# Patient Record
Sex: MALE | Race: WHITE | NOT HISPANIC OR LATINO | Employment: UNEMPLOYED | URBAN - METROPOLITAN AREA
[De-identification: names, ages, dates, MRNs, and addresses within clinical notes are randomized per-mention and may not be internally consistent; named-entity substitution may affect disease eponyms.]

---

## 2017-04-26 ENCOUNTER — RESOLUTE PROFESSIONAL BILLING HOSPITAL PROF FEE (OUTPATIENT)
Dept: HOSPITALIST | Facility: MEDICAL CENTER | Age: 32
End: 2017-04-26
Payer: MEDICAID

## 2017-04-26 ENCOUNTER — HOSPITAL ENCOUNTER (INPATIENT)
Facility: MEDICAL CENTER | Age: 32
LOS: 2 days | DRG: 639 | End: 2017-04-28
Attending: EMERGENCY MEDICINE | Admitting: HOSPITALIST
Payer: MEDICAID

## 2017-04-26 DIAGNOSIS — Z91.148 NONCOMPLIANCE WITH MEDICATION REGIMEN: ICD-10-CM

## 2017-04-26 DIAGNOSIS — E10.10 DIABETIC KETOACIDOSIS WITHOUT COMA ASSOCIATED WITH TYPE 1 DIABETES MELLITUS (HCC): ICD-10-CM

## 2017-04-26 PROBLEM — E11.10 DKA (DIABETIC KETOACIDOSES): Status: ACTIVE | Noted: 2017-04-26

## 2017-04-26 LAB
ALBUMIN SERPL BCP-MCNC: 4.1 G/DL (ref 3.2–4.9)
ALBUMIN SERPL BCP-MCNC: 4.5 G/DL (ref 3.2–4.9)
ALBUMIN/GLOB SERPL: 1.5 G/DL
ALBUMIN/GLOB SERPL: 1.6 G/DL
ALP SERPL-CCNC: 76 U/L (ref 30–99)
ALP SERPL-CCNC: 84 U/L (ref 30–99)
ALT SERPL-CCNC: 10 U/L (ref 2–50)
ALT SERPL-CCNC: 12 U/L (ref 2–50)
ANION GAP SERPL CALC-SCNC: 15 MMOL/L (ref 0–11.9)
ANION GAP SERPL CALC-SCNC: 8 MMOL/L (ref 0–11.9)
APPEARANCE UR: CLEAR
AST SERPL-CCNC: 15 U/L (ref 12–45)
AST SERPL-CCNC: 8 U/L (ref 12–45)
B-OH-BUTYR SERPL-MCNC: 3.94 MMOL/L (ref 0.02–0.27)
BASOPHILS # BLD AUTO: 0.6 % (ref 0–1.8)
BASOPHILS # BLD AUTO: 0.7 % (ref 0–1.8)
BASOPHILS # BLD: 0.04 K/UL (ref 0–0.12)
BASOPHILS # BLD: 0.04 K/UL (ref 0–0.12)
BILIRUB SERPL-MCNC: 0.7 MG/DL (ref 0.1–1.5)
BILIRUB SERPL-MCNC: 0.9 MG/DL (ref 0.1–1.5)
BILIRUB UR QL STRIP.AUTO: NEGATIVE
BUN SERPL-MCNC: 19 MG/DL (ref 8–22)
BUN SERPL-MCNC: 24 MG/DL (ref 8–22)
CALCIUM SERPL-MCNC: 8.7 MG/DL (ref 8.5–10.5)
CALCIUM SERPL-MCNC: 9.4 MG/DL (ref 8.5–10.5)
CHLORIDE SERPL-SCNC: 104 MMOL/L (ref 96–112)
CHLORIDE SERPL-SCNC: 95 MMOL/L (ref 96–112)
CO2 SERPL-SCNC: 20 MMOL/L (ref 20–33)
CO2 SERPL-SCNC: 22 MMOL/L (ref 20–33)
COLOR UR: COLORLESS
CREAT SERPL-MCNC: 0.78 MG/DL (ref 0.5–1.4)
CREAT SERPL-MCNC: 1.08 MG/DL (ref 0.5–1.4)
CULTURE IF INDICATED INDCX: NO UA CULTURE
EOSINOPHIL # BLD AUTO: 0.05 K/UL (ref 0–0.51)
EOSINOPHIL # BLD AUTO: 0.12 K/UL (ref 0–0.51)
EOSINOPHIL NFR BLD: 0.7 % (ref 0–6.9)
EOSINOPHIL NFR BLD: 2 % (ref 0–6.9)
ERYTHROCYTE [DISTWIDTH] IN BLOOD BY AUTOMATED COUNT: 38.1 FL (ref 35.9–50)
ERYTHROCYTE [DISTWIDTH] IN BLOOD BY AUTOMATED COUNT: 38.3 FL (ref 35.9–50)
GFR SERPL CREATININE-BSD FRML MDRD: >60 ML/MIN/1.73 M 2
GFR SERPL CREATININE-BSD FRML MDRD: >60 ML/MIN/1.73 M 2
GLOBULIN SER CALC-MCNC: 2.5 G/DL (ref 1.9–3.5)
GLOBULIN SER CALC-MCNC: 3.1 G/DL (ref 1.9–3.5)
GLUCOSE BLD-MCNC: 569 MG/DL (ref 65–99)
GLUCOSE SERPL-MCNC: 284 MG/DL (ref 65–99)
GLUCOSE SERPL-MCNC: 658 MG/DL (ref 65–99)
GLUCOSE UR STRIP.AUTO-MCNC: >1000 MG/DL
HCT VFR BLD AUTO: 41.8 % (ref 42–52)
HCT VFR BLD AUTO: 46.5 % (ref 42–52)
HGB BLD-MCNC: 14.1 G/DL (ref 14–18)
HGB BLD-MCNC: 15.7 G/DL (ref 14–18)
IMM GRANULOCYTES # BLD AUTO: 0.01 K/UL (ref 0–0.11)
IMM GRANULOCYTES # BLD AUTO: 0.02 K/UL (ref 0–0.11)
IMM GRANULOCYTES NFR BLD AUTO: 0.2 % (ref 0–0.9)
IMM GRANULOCYTES NFR BLD AUTO: 0.3 % (ref 0–0.9)
KETONES UR STRIP.AUTO-MCNC: 80 MG/DL
LEUKOCYTE ESTERASE UR QL STRIP.AUTO: NEGATIVE
LIPASE SERPL-CCNC: 4 U/L (ref 11–82)
LYMPHOCYTES # BLD AUTO: 1.39 K/UL (ref 1–4.8)
LYMPHOCYTES # BLD AUTO: 2.09 K/UL (ref 1–4.8)
LYMPHOCYTES NFR BLD: 20.3 % (ref 22–41)
LYMPHOCYTES NFR BLD: 34.9 % (ref 22–41)
MAGNESIUM SERPL-MCNC: 2 MG/DL (ref 1.5–2.5)
MCH RBC QN AUTO: 29.3 PG (ref 27–33)
MCH RBC QN AUTO: 29.6 PG (ref 27–33)
MCHC RBC AUTO-ENTMCNC: 33.7 G/DL (ref 33.7–35.3)
MCHC RBC AUTO-ENTMCNC: 33.8 G/DL (ref 33.7–35.3)
MCV RBC AUTO: 86.9 FL (ref 81.4–97.8)
MCV RBC AUTO: 87.7 FL (ref 81.4–97.8)
MICRO URNS: ABNORMAL
MONOCYTES # BLD AUTO: 0.43 K/UL (ref 0–0.85)
MONOCYTES # BLD AUTO: 0.44 K/UL (ref 0–0.85)
MONOCYTES NFR BLD AUTO: 6.3 % (ref 0–13.4)
MONOCYTES NFR BLD AUTO: 7.3 % (ref 0–13.4)
NEUTROPHILS # BLD AUTO: 3.29 K/UL (ref 1.82–7.42)
NEUTROPHILS # BLD AUTO: 4.92 K/UL (ref 1.82–7.42)
NEUTROPHILS NFR BLD: 54.9 % (ref 44–72)
NEUTROPHILS NFR BLD: 71.8 % (ref 44–72)
NITRITE UR QL STRIP.AUTO: NEGATIVE
NRBC # BLD AUTO: 0 K/UL
NRBC # BLD AUTO: 0 K/UL
NRBC BLD AUTO-RTO: 0 /100 WBC
NRBC BLD AUTO-RTO: 0 /100 WBC
PH UR STRIP.AUTO: 5 [PH]
PLATELET # BLD AUTO: 230 K/UL (ref 164–446)
PLATELET # BLD AUTO: 302 K/UL (ref 164–446)
PMV BLD AUTO: 10 FL (ref 9–12.9)
PMV BLD AUTO: 10.1 FL (ref 9–12.9)
POTASSIUM SERPL-SCNC: 4.7 MMOL/L (ref 3.6–5.5)
POTASSIUM SERPL-SCNC: 5 MMOL/L (ref 3.6–5.5)
PROT SERPL-MCNC: 6.6 G/DL (ref 6–8.2)
PROT SERPL-MCNC: 7.6 G/DL (ref 6–8.2)
PROT UR QL STRIP: NEGATIVE MG/DL
RBC # BLD AUTO: 4.81 M/UL (ref 4.7–6.1)
RBC # BLD AUTO: 5.3 M/UL (ref 4.7–6.1)
RBC UR QL AUTO: NEGATIVE
SODIUM SERPL-SCNC: 130 MMOL/L (ref 135–145)
SODIUM SERPL-SCNC: 134 MMOL/L (ref 135–145)
SP GR UR STRIP.AUTO: 1.03
TSH SERPL DL<=0.005 MIU/L-ACNC: 1.11 UIU/ML (ref 0.3–3.7)
WBC # BLD AUTO: 6 K/UL (ref 4.8–10.8)
WBC # BLD AUTO: 6.9 K/UL (ref 4.8–10.8)

## 2017-04-26 PROCEDURE — 99291 CRITICAL CARE FIRST HOUR: CPT

## 2017-04-26 PROCEDURE — 700111 HCHG RX REV CODE 636 W/ 250 OVERRIDE (IP): Performed by: HOSPITALIST

## 2017-04-26 PROCEDURE — 700102 HCHG RX REV CODE 250 W/ 637 OVERRIDE(OP): Performed by: HOSPITALIST

## 2017-04-26 PROCEDURE — 96375 TX/PRO/DX INJ NEW DRUG ADDON: CPT

## 2017-04-26 PROCEDURE — A9270 NON-COVERED ITEM OR SERVICE: HCPCS | Performed by: HOSPITALIST

## 2017-04-26 PROCEDURE — 700111 HCHG RX REV CODE 636 W/ 250 OVERRIDE (IP): Performed by: EMERGENCY MEDICINE

## 2017-04-26 PROCEDURE — 83735 ASSAY OF MAGNESIUM: CPT

## 2017-04-26 PROCEDURE — 700105 HCHG RX REV CODE 258: Performed by: EMERGENCY MEDICINE

## 2017-04-26 PROCEDURE — 85025 COMPLETE CBC W/AUTO DIFF WBC: CPT

## 2017-04-26 PROCEDURE — 770022 HCHG ROOM/CARE - ICU (200)

## 2017-04-26 PROCEDURE — 84443 ASSAY THYROID STIM HORMONE: CPT

## 2017-04-26 PROCEDURE — 83690 ASSAY OF LIPASE: CPT

## 2017-04-26 PROCEDURE — 94760 N-INVAS EAR/PLS OXIMETRY 1: CPT

## 2017-04-26 PROCEDURE — 84100 ASSAY OF PHOSPHORUS: CPT

## 2017-04-26 PROCEDURE — 96374 THER/PROPH/DIAG INJ IV PUSH: CPT

## 2017-04-26 PROCEDURE — 81003 URINALYSIS AUTO W/O SCOPE: CPT

## 2017-04-26 PROCEDURE — 99223 1ST HOSP IP/OBS HIGH 75: CPT | Performed by: HOSPITALIST

## 2017-04-26 PROCEDURE — 96361 HYDRATE IV INFUSION ADD-ON: CPT

## 2017-04-26 PROCEDURE — 700105 HCHG RX REV CODE 258: Performed by: HOSPITALIST

## 2017-04-26 PROCEDURE — 82962 GLUCOSE BLOOD TEST: CPT | Mod: 91

## 2017-04-26 PROCEDURE — 82010 KETONE BODYS QUAN: CPT

## 2017-04-26 PROCEDURE — 83036 HEMOGLOBIN GLYCOSYLATED A1C: CPT

## 2017-04-26 PROCEDURE — 80053 COMPREHEN METABOLIC PANEL: CPT | Mod: 91

## 2017-04-26 RX ORDER — NICOTINE 21 MG/24HR
14 PATCH, TRANSDERMAL 24 HOURS TRANSDERMAL
Status: DISCONTINUED | OUTPATIENT
Start: 2017-04-27 | End: 2017-04-28 | Stop reason: HOSPADM

## 2017-04-26 RX ORDER — GLIPIZIDE 10 MG/1
10 TABLET ORAL 2 TIMES DAILY
Status: ON HOLD | COMMUNITY
End: 2017-04-27

## 2017-04-26 RX ORDER — POLYETHYLENE GLYCOL 3350 17 G/17G
1 POWDER, FOR SOLUTION ORAL
Status: DISCONTINUED | OUTPATIENT
Start: 2017-04-26 | End: 2017-04-28 | Stop reason: HOSPADM

## 2017-04-26 RX ORDER — MAGNESIUM SULFATE HEPTAHYDRATE 40 MG/ML
4 INJECTION, SOLUTION INTRAVENOUS
Status: DISCONTINUED | OUTPATIENT
Start: 2017-04-26 | End: 2017-04-27 | Stop reason: ALTCHOICE

## 2017-04-26 RX ORDER — ONDANSETRON 2 MG/ML
4 INJECTION INTRAMUSCULAR; INTRAVENOUS EVERY 4 HOURS PRN
Status: DISCONTINUED | OUTPATIENT
Start: 2017-04-26 | End: 2017-04-28 | Stop reason: HOSPADM

## 2017-04-26 RX ORDER — ONDANSETRON 2 MG/ML
4 INJECTION INTRAMUSCULAR; INTRAVENOUS ONCE
Status: COMPLETED | OUTPATIENT
Start: 2017-04-26 | End: 2017-04-26

## 2017-04-26 RX ORDER — GABAPENTIN 300 MG/1
300 CAPSULE ORAL 3 TIMES DAILY
Status: DISCONTINUED | OUTPATIENT
Start: 2017-04-26 | End: 2017-04-28 | Stop reason: HOSPADM

## 2017-04-26 RX ORDER — SODIUM CHLORIDE 9 MG/ML
1000 INJECTION, SOLUTION INTRAVENOUS ONCE
Status: COMPLETED | OUTPATIENT
Start: 2017-04-26 | End: 2017-04-26

## 2017-04-26 RX ORDER — MAGNESIUM SULFATE HEPTAHYDRATE 40 MG/ML
2 INJECTION, SOLUTION INTRAVENOUS
Status: DISCONTINUED | OUTPATIENT
Start: 2017-04-26 | End: 2017-04-27 | Stop reason: ALTCHOICE

## 2017-04-26 RX ORDER — SODIUM CHLORIDE 9 MG/ML
INJECTION, SOLUTION INTRAVENOUS CONTINUOUS
Status: DISPENSED | OUTPATIENT
Start: 2017-04-26 | End: 2017-04-27

## 2017-04-26 RX ORDER — DEXTROSE AND SODIUM CHLORIDE 10; .45 G/100ML; G/100ML
INJECTION, SOLUTION INTRAVENOUS CONTINUOUS
Status: DISCONTINUED | OUTPATIENT
Start: 2017-04-26 | End: 2017-04-27

## 2017-04-26 RX ORDER — AMOXICILLIN 250 MG
2 CAPSULE ORAL 2 TIMES DAILY
Status: DISCONTINUED | OUTPATIENT
Start: 2017-04-26 | End: 2017-04-28 | Stop reason: HOSPADM

## 2017-04-26 RX ORDER — POTASSIUM CHLORIDE 7.45 MG/ML
10 INJECTION INTRAVENOUS ONCE
Status: COMPLETED | OUTPATIENT
Start: 2017-04-27 | End: 2017-04-27

## 2017-04-26 RX ORDER — ZOLPIDEM TARTRATE 5 MG/1
5 TABLET ORAL
Status: DISCONTINUED | OUTPATIENT
Start: 2017-04-26 | End: 2017-04-28 | Stop reason: HOSPADM

## 2017-04-26 RX ORDER — PROMETHAZINE HYDROCHLORIDE 25 MG/1
12.5-25 SUPPOSITORY RECTAL EVERY 4 HOURS PRN
Status: DISCONTINUED | OUTPATIENT
Start: 2017-04-26 | End: 2017-04-28 | Stop reason: HOSPADM

## 2017-04-26 RX ORDER — PROMETHAZINE HYDROCHLORIDE 25 MG/1
12.5-25 TABLET ORAL EVERY 4 HOURS PRN
Status: DISCONTINUED | OUTPATIENT
Start: 2017-04-26 | End: 2017-04-28 | Stop reason: HOSPADM

## 2017-04-26 RX ORDER — LABETALOL HYDROCHLORIDE 5 MG/ML
10 INJECTION, SOLUTION INTRAVENOUS EVERY 4 HOURS PRN
Status: DISCONTINUED | OUTPATIENT
Start: 2017-04-26 | End: 2017-04-28 | Stop reason: HOSPADM

## 2017-04-26 RX ORDER — SODIUM CHLORIDE 9 MG/ML
2000 INJECTION, SOLUTION INTRAVENOUS ONCE
Status: COMPLETED | OUTPATIENT
Start: 2017-04-26 | End: 2017-04-26

## 2017-04-26 RX ORDER — DEXTROSE MONOHYDRATE 25 G/50ML
25 INJECTION, SOLUTION INTRAVENOUS
Status: DISCONTINUED | OUTPATIENT
Start: 2017-04-26 | End: 2017-04-28 | Stop reason: HOSPADM

## 2017-04-26 RX ORDER — SODIUM CHLORIDE 450 MG/100ML
INJECTION, SOLUTION INTRAVENOUS CONTINUOUS
Status: DISCONTINUED | OUTPATIENT
Start: 2017-04-26 | End: 2017-04-27

## 2017-04-26 RX ORDER — LORAZEPAM 2 MG/ML
0.5 INJECTION INTRAMUSCULAR EVERY 6 HOURS PRN
Status: DISCONTINUED | OUTPATIENT
Start: 2017-04-26 | End: 2017-04-28 | Stop reason: HOSPADM

## 2017-04-26 RX ORDER — MORPHINE SULFATE 4 MG/ML
4 INJECTION, SOLUTION INTRAMUSCULAR; INTRAVENOUS ONCE
Status: COMPLETED | OUTPATIENT
Start: 2017-04-26 | End: 2017-04-26

## 2017-04-26 RX ORDER — ACETAMINOPHEN 325 MG/1
650 TABLET ORAL EVERY 6 HOURS PRN
Status: DISCONTINUED | OUTPATIENT
Start: 2017-04-26 | End: 2017-04-28 | Stop reason: HOSPADM

## 2017-04-26 RX ORDER — SODIUM CHLORIDE 9 MG/ML
2000 INJECTION, SOLUTION INTRAVENOUS ONCE
Status: DISCONTINUED | OUTPATIENT
Start: 2017-04-26 | End: 2017-04-27

## 2017-04-26 RX ORDER — DEXTROSE AND SODIUM CHLORIDE 5; .45 G/100ML; G/100ML
INJECTION, SOLUTION INTRAVENOUS CONTINUOUS
Status: DISCONTINUED | OUTPATIENT
Start: 2017-04-26 | End: 2017-04-27

## 2017-04-26 RX ORDER — BISACODYL 10 MG
10 SUPPOSITORY, RECTAL RECTAL
Status: DISCONTINUED | OUTPATIENT
Start: 2017-04-26 | End: 2017-04-28 | Stop reason: HOSPADM

## 2017-04-26 RX ORDER — HEPARIN SODIUM 5000 [USP'U]/ML
5000 INJECTION, SOLUTION INTRAVENOUS; SUBCUTANEOUS EVERY 8 HOURS
Status: DISCONTINUED | OUTPATIENT
Start: 2017-04-26 | End: 2017-04-28 | Stop reason: HOSPADM

## 2017-04-26 RX ORDER — LORAZEPAM 0.5 MG/1
0.5 TABLET ORAL EVERY 6 HOURS PRN
Status: DISCONTINUED | OUTPATIENT
Start: 2017-04-26 | End: 2017-04-28 | Stop reason: HOSPADM

## 2017-04-26 RX ORDER — ONDANSETRON 4 MG/1
4 TABLET, ORALLY DISINTEGRATING ORAL EVERY 4 HOURS PRN
Status: DISCONTINUED | OUTPATIENT
Start: 2017-04-26 | End: 2017-04-28 | Stop reason: HOSPADM

## 2017-04-26 RX ADMIN — SODIUM CHLORIDE 1000 ML: 9 INJECTION, SOLUTION INTRAVENOUS at 18:34

## 2017-04-26 RX ADMIN — ZOLPIDEM TARTRATE 5 MG: 5 TABLET, FILM COATED ORAL at 23:12

## 2017-04-26 RX ADMIN — SODIUM CHLORIDE 2000 ML: 9 INJECTION, SOLUTION INTRAVENOUS at 19:31

## 2017-04-26 RX ADMIN — INSULIN HUMAN 7 UNITS: 100 INJECTION, SOLUTION PARENTERAL at 23:12

## 2017-04-26 RX ADMIN — GABAPENTIN 300 MG: 300 CAPSULE ORAL at 23:12

## 2017-04-26 RX ADMIN — SODIUM CHLORIDE: 9 INJECTION, SOLUTION INTRAVENOUS at 23:11

## 2017-04-26 RX ADMIN — MORPHINE SULFATE 4 MG: 4 INJECTION INTRAVENOUS at 20:42

## 2017-04-26 RX ADMIN — SODIUM CHLORIDE 7 UNITS/HR: 9 INJECTION, SOLUTION INTRAVENOUS at 23:12

## 2017-04-26 RX ADMIN — HEPARIN SODIUM 5000 UNITS: 5000 INJECTION, SOLUTION INTRAVENOUS; SUBCUTANEOUS at 23:12

## 2017-04-26 RX ADMIN — ONDANSETRON 4 MG: 2 INJECTION INTRAMUSCULAR; INTRAVENOUS at 20:42

## 2017-04-26 ASSESSMENT — LIFESTYLE VARIABLES
DO YOU DRINK ALCOHOL: NO
DO YOU DRINK ALCOHOL: NO
EVER_SMOKED: YES

## 2017-04-26 ASSESSMENT — PAIN SCALES - GENERAL: PAINLEVEL_OUTOF10: 6

## 2017-04-26 NOTE — IP AVS SNAPSHOT
" Home Care Instructions                                                                                                                  Name:Paras Lamb  Medical Record Number:[id number]  CSN: [id number]    YOB: 1985   Age: 31 y.o.  Sex: male  HT:1.778 m (5' 10\") WT: 70.4 kg (155 lb 3.3 oz)          Admit Date: 4/26/2017     Discharge Date:   Today's Date: 4/28/2017  Attending Doctor:  Casey Alan M.D.                  Allergies:  Review of patient's allergies indicates no known allergies.            Discharge Instructions       Discharge Instructions    Discharged to home by car with self. Discharged via walking, hospital escort: Refused.  Special equipment needed: Not Applicable    Be sure to schedule a follow-up appointment with your primary care doctor or any specialists as instructed.     Discharge Plan:   Diet Plan: Discussed  Activity Level: Discussed  Smoking Cessation Offered: Patient Refused  Confirmed Follow up Appointment: Patient to Call and Schedule Appointment  Confirmed Symptoms Management: Discussed  Medication Reconciliation Updated: Yes  Influenza Vaccine Indication: Not indicated: Previously immunized this influenza season and > 8 years of age    I understand that a diet low in cholesterol, fat, and sodium is recommended for good health. Unless I have been given specific instructions below for another diet, I accept this instruction as my diet prescription.   Other diet: Diabetic Diet    Special Instructions: None    · Is patient discharged on Warfarin / Coumadin?   No     · Is patient Post Blood Transfusion?  No    Depression / Suicide Risk    As you are discharged from this [facility] facility, it is important to learn how to keep safe from harming yourself.    Recognize the warning signs:  · Abrupt changes in personality, positive or negative- including increase in energy   · Giving away possessions  · Change in eating patterns- significant weight changes-  positive or " negative  · Change in sleeping patterns- unable to sleep or sleeping all the time   · Unwillingness or inability to communicate  · Depression  · Unusual sadness, discouragement and loneliness  · Talk of wanting to die  · Neglect of personal appearance   · Rebelliousness- reckless behavior  · Withdrawal from people/activities they love  · Confusion- inability to concentrate     If you or a loved one observes any of these behaviors or has concerns about self-harm, here's what you can do:  · Talk about it- your feelings and reasons for harming yourself  · Remove any means that you might use to hurt yourself (examples: pills, rope, extension cords, firearm)  · Get professional help from the community (Mental Health, Substance Abuse, psychological counseling)  · Do not be alone:Call your Safe Contact- someone whom you trust who will be there for you.  · Call your local CRISIS HOTLINE 144-0136 or 343-268-3821  · Call your local Children's Mobile Crisis Response Team [place] (561) 336-3152 or www.relocality  · Call the toll free National Suicide Prevention Hotlines   · National Suicide Prevention Lifeline 256-260-RPRK (1349)  · National Hope Line Network 800-SUICIDE (015-1792)        Follow-up Information     1. Follow up with GIOVANNI Villarreal. Go on 5/2/2017.    Why:  Please arrive at 8am for your appointment. Please bring photo ID, insurance information, and list of medications.    Contact information    [address] [place] [phone]         Discharge Medication Instructions:    Below are the medications your physician expects you to take upon discharge:    Review all your home medications and newly ordered medications with your doctor and/or pharmacist. Follow medication instructions as directed by your doctor and/or pharmacist.    Please keep your medication list with you and share with your physician.               Medication List      START taking these medications         Instructions    Morning Afternoon Evening Bedtime    gabapentin 300 MG Caps   Last time this was given:  300 mg on 4/28/2017  9:48 AM   Commonly known as:  NEURONTIN        Take 1 Cap by mouth 3 times a day.   Dose:  300 mg                        insulin lispro 100 UNIT/ML Soln   Last time this was given:  7 Units on 4/28/2017 12:55 PM   Commonly known as:  HUMALOG        Inject 3-14 Units as instructed every 6 hours.   Dose:  3-14 Units                        zolpidem 5 MG Tabs   Last time this was given:  5 mg on 4/27/2017  9:24 PM   Commonly known as:  AMBIEN        Take 1 Tab by mouth at bedtime as needed.  May repeat 1 time..   Dose:  5 mg                          CHANGE how you take these medications        Instructions    Morning Afternoon Evening Bedtime    glipiZIDE 5 MG Tabs   What changed:    - medication strength  - how much to take  - when to take this   Last time this was given:  5 mg on 4/27/2017  8:11 AM   Commonly known as:  GLUCOTROL        Take 1 Tab by mouth every morning before breakfast.   Dose:  5 mg                        * insulin 70/30 (70-30) 100 UNIT/ML Susp   What changed:  Another medication with the same name was added. Make sure you understand how and when to take each.   Last time this was given:  20 Units on 4/27/2017  3:46 AM   Commonly known as:  HUMULIN,NOVOLIN        Inject 20 Units as instructed 2 Times a Day.   Dose:  20 Units                        * insulin 70/30 (70-30) 100 UNIT/ML Susp   What changed:  You were already taking a medication with the same name, and this prescription was added. Make sure you understand how and when to take each.   Last time this was given:  20 Units on 4/27/2017  3:46 AM   Commonly known as:  HUMULIN,NOVOLIN        Inject 20 Units as instructed 2 Times a Day.   Dose:  20 Units                        * Notice:  This list has 2 medication(s) that are the same as other medications prescribed for you. Read the directions carefully, and ask your  doctor or other care provider to review them with you.      CONTINUE taking these medications        Instructions    Morning Afternoon Evening Bedtime    insulin aspart 100 UNIT/ML Soln   Commonly known as:  NOVOLOG        Inject 2-10 Units as instructed 3 times a day before meals. Pt takes: 151-200 2 units 201-250 4 units 251-300 6 units 301-350 8 units 351-400 10 units >400 call MD   Dose:  2-10 Units                             Where to Get Your Medications      Information about where to get these medications is not yet available     ! Ask your nurse or doctor about these medications    - gabapentin 300 MG Caps  - glipiZIDE 5 MG Tabs  - insulin 70/30 (70-30) 100 UNIT/ML Susp  - insulin lispro 100 UNIT/ML Soln  - zolpidem 5 MG Tabs            Instructions           Diet / Nutrition:    Follow any diet instructions given to you by your doctor or the dietician, including how much salt (sodium) you are allowed each day.    If you are overweight, talk to your doctor about a weight reduction plan.    Activity:    Remain physically active following your doctor's instructions about exercise and activity.    Rest often.     Any time you become even a little tired or short of breath, SIT DOWN and rest.    Worsening Symptoms:    Report any of the following signs and symptoms to the doctor's office immediately:    *Pain of jaw, arm, or neck  *Chest pain not relieved by medication                               *Dizziness or loss of consciousness  *Difficulty breathing even when at rest   *More tired than usual                                       *Bleeding drainage or swelling of surgical site  *Swelling of feet, ankles, legs or stomach                 *Fever (>100ºF)  *Pink or blood tinged sputum  *Weight gain (3lbs/day or 5lbs /week)           *Shock from internal defibrillator (if applicable)  *Palpitations or irregular heartbeats                *Cool and/or numb extremities    Stroke Awareness    Common Risk Factors for  Stroke include:    Age  Atrial Fibrillation  Carotid Artery Stenosis  Diabetes Mellitus  Excessive alcohol consumption  High blood pressure  Overweight   Physical inactivity  Smoking    Warning signs and symptoms of a stroke include:    *Sudden numbness or weakness of the face, arm or leg (especially on one side of the body).  *Sudden confusion, trouble speaking or understanding.  *Sudden trouble seeing in one or both eyes.  *Sudden trouble walking, dizziness, loss of balance or coordination.Sudden severe headache with no known cause.    It is very important to get treatment quickly when a stroke occurs. If you experience any of the above warning signs, call 911 immediately.                   Disclaimer         Quit Smoking / Tobacco Use:    I understand the use of any tobacco products increases my chance of suffering from future heart disease or stroke and could cause other illnesses which may shorten my life. Quitting the use of tobacco products is the single most important thing I can do to improve my health. For further information on smoking / tobacco cessation call a Toll Free Quit Line at 1-912.867.5997 (*National Cancer [place]) or 1-609.824.5482 (American Lung Association) or you can access the web based program at www.lungusa.org.    [place] Tobacco Users Help Line:  (417) 515-8625       Toll Free: 1-684.383.9846  Quit Tobacco Program [facility] (532)476-9950    Crisis Hotline:    [place] Crisis Hotline:  8-365-PKVOBNC or 1-100.961.1882    [place] Crisis Hotline:    1-205.750.1238 or 158-261-4788    Discharge Survey:   Thank you for choosing [facility]. We hope we did everything we could to make your hospital stay a pleasant one. You may be receiving a phone survey and we would appreciate your time and participation in answering the questions. Your input is very valuable to us in our efforts to improve our service to our patients and their families.        My signature on this  form indicates that:    1. I have reviewed and understand the above information.  2. My questions regarding this information have been answered to my satisfaction.  3. I have formulated a plan with my discharge nurse to obtain my prescribed medications for home.                  Disclaimer         __________________________________                     __________       ________                       Patient Signature                                                 Date                    Time

## 2017-04-26 NOTE — IP AVS SNAPSHOT
" <p align=\"LEFT\"><IMG SRC=\"//[email or web address]/blob$/[email or web address]\" alt=\"Image\" WIDTH=\"50%\" HEIGHT=\"200\" BORDER=\"\"></p>                   Name:Paras Lamb  Medical Record Number:[id number]  CSN: [id number]    YOB: 1985   Age: 31 y.o.  Sex: male  HT:1.778 m (5' 10\") WT: 70.4 kg (155 lb 3.3 oz)          Admit Date: 4/26/2017     Discharge Date:   Today's Date: 4/28/2017  Attending Doctor:  Casey Alan M.D.                  Allergies:  Review of patient's allergies indicates no known allergies.          Follow-up Information     1. Follow up with GIOVANNI Villarreal. Go on 5/2/2017.    Why:  Please arrive at 8am for your appointment. Please bring photo ID, insurance information, and list of medications.    Contact information    [address] [place] [address]  [phone]         Medication List      Take these Medications        Instructions    gabapentin 300 MG Caps   Commonly known as:  NEURONTIN    Take 1 Cap by mouth 3 times a day.   Dose:  300 mg       glipiZIDE 5 MG Tabs   What changed:    - medication strength  - how much to take  - when to take this   Commonly known as:  GLUCOTROL    Take 1 Tab by mouth every morning before breakfast.   Dose:  5 mg       * insulin 70/30 (70-30) 100 UNIT/ML Susp   What changed:  Another medication with the same name was added. Make sure you understand how and when to take each.   Commonly known as:  HUMULIN,NOVOLIN    Inject 20 Units as instructed 2 Times a Day.   Dose:  20 Units       * insulin 70/30 (70-30) 100 UNIT/ML Susp   What changed:  You were already taking a medication with the same name, and this prescription was added. Make sure you understand how and when to take each.   Commonly known as:  HUMULIN,NOVOLIN    Inject 20 Units as instructed 2 Times a Day.   Dose:  20 Units       insulin aspart 100 UNIT/ML Soln   Commonly known as:  NOVOLOG    Inject 2-10 Units as instructed 3 times a day before meals. Pt takes: 151-200 2 units 201-250 4 units 251-300 6 " units 301-350 8 units 351-400 10 units >400 call MD   Dose:  2-10 Units       insulin lispro 100 UNIT/ML Soln   Commonly known as:  HUMALOG    Inject 3-14 Units as instructed every 6 hours.   Dose:  3-14 Units       zolpidem 5 MG Tabs   Commonly known as:  AMBIEN    Take 1 Tab by mouth at bedtime as needed.  May repeat 1 time..   Dose:  5 mg       * Notice:  This list has 2 medication(s) that are the same as other medications prescribed for you. Read the directions carefully, and ask your doctor or other care provider to review them with you.

## 2017-04-26 NOTE — IP AVS SNAPSHOT
Boosket Access Code: [email or web address]  Expires: 5/28/2017  2:08 PM    Boosket  A secure, online tool to manage your health information     [employer]’s Boosket® is a secure, online tool that connects you to your personalized health information from the privacy of your home -- day or night - making it very easy for you to manage your healthcare. Once the activation process is completed, you can even access your medical information using the Boosket cristina, which is available for free in the Apple Cristina store or Google Play store.     Boosket provides the following levels of access (as shown below):   My Chart Features   [facility] Primary Care Doctor [facility]  Specialists [facility]  Urgent  Care Non-[facility]  Primary Care  Doctor   Email your healthcare team securely and privately 24/7 X X X X   Manage appointments: schedule your next appointment; view details of past/upcoming appointments X      Request prescription refills. X      View recent personal medical records, including lab and immunizations X X X X   View health record, including health history, allergies, medications X X X X   Read reports about your outpatient visits, procedures, consult and ER notes X X X X   See your discharge summary, which is a recap of your hospital and/or ER visit that includes your diagnosis, lab results, and care plan. X X       How to register for Boosket:  1. Go to  [email or web address].  2. Click on the Sign Up Now box, which takes you to the New Member Sign Up page. You will need to provide the following information:  a. Enter your Boosket Access Code exactly as it appears at the top of this page. (You will not need to use this code after you’ve completed the sign-up process. If you do not sign up before the expiration date, you must request a new code.)   b. Enter your date of birth.   c. Enter your home email address.   d. Click Submit, and follow the next screen’s instructions.  3. Create a Boosket ID. This will be your Boosket  login ID and cannot be changed, so think of one that is secure and easy to remember.  4. Create a [employer] password. You can change your password at any time.  5. Enter your Password Reset Question and Answer. This can be used at a later time if you forget your password.   6. Enter your e-mail address. This allows you to receive e-mail notifications when new information is available in [employer].  7. Click Sign Up. You can now view your health information.    For assistance activating your [employer] account, call [phone]

## 2017-04-26 NOTE — IP AVS SNAPSHOT
4/28/2017    Paras Lamb  [address]  [place] [facility] [address]    Dear Paras:    [facility] wants to ensure your discharge home is safe and you or your loved ones have had all of your questions answered regarding your care after you leave the hospital.    Below is a list of resources and contact information should you have any questions regarding your hospital stay, follow-up instructions, or active medical symptoms.    Questions or Concerns Regarding… Contact   Medical Questions Related to Your Discharge  (7 days a week, 8am-5pm) Contact a Nurse Care Coordinator   [phone]   Medical Questions Not Related to Your Discharge  (24 hours a day / 7 days a week)  Contact the Nurse Health Line   [phone]    Medications or Discharge Instructions Refer to your discharge packet   or contact your [facility] Primary Care Provider   [phone]   Follow-up Appointment(s) Schedule your appointment via [employer]   or contact Scheduling [phone]   Billing Review your statement via [employer]  or contact Billing [phone]   Medical Records Review your records via [employer]   or contact Medical Records [phone]     You may receive a telephone call within two days of discharge. This call is to make certain you understand your discharge instructions and have the opportunity to have any questions answered. You can also easily access your medical information, test results and upcoming appointments via the [employer] free online health management tool. You can learn more and sign up at [employer]. For assistance setting up your [employer] account, please call [phone].    Once again, we want to ensure your discharge home is safe and that you have a clear understanding of any next steps in your care. If you have any questions or concerns, please do not hesitate to contact us, we are here for you. Thank you for choosing [facility] for your healthcare needs.    Sincerely,    Your [facility] Healthcare Team

## 2017-04-27 ENCOUNTER — PATIENT OUTREACH (OUTPATIENT)
Dept: HEALTH INFORMATION MANAGEMENT | Facility: OTHER | Age: 32
End: 2017-04-27

## 2017-04-27 ENCOUNTER — APPOINTMENT (OUTPATIENT)
Dept: RADIOLOGY | Facility: MEDICAL CENTER | Age: 32
DRG: 639 | End: 2017-04-27
Attending: HOSPITALIST
Payer: MEDICAID

## 2017-04-27 LAB
ANION GAP SERPL CALC-SCNC: 4 MMOL/L (ref 0–11.9)
ANION GAP SERPL CALC-SCNC: 4 MMOL/L (ref 0–11.9)
B-OH-BUTYR SERPL-MCNC: 0.36 MMOL/L (ref 0.02–0.27)
BUN SERPL-MCNC: 19 MG/DL (ref 8–22)
BUN SERPL-MCNC: 21 MG/DL (ref 8–22)
CALCIUM SERPL-MCNC: 8.8 MG/DL (ref 8.5–10.5)
CALCIUM SERPL-MCNC: 8.9 MG/DL (ref 8.5–10.5)
CHLORIDE SERPL-SCNC: 107 MMOL/L (ref 96–112)
CHLORIDE SERPL-SCNC: 107 MMOL/L (ref 96–112)
CHOLEST SERPL-MCNC: 164 MG/DL (ref 100–199)
CO2 SERPL-SCNC: 24 MMOL/L (ref 20–33)
CO2 SERPL-SCNC: 24 MMOL/L (ref 20–33)
CREAT SERPL-MCNC: 0.63 MG/DL (ref 0.5–1.4)
CREAT SERPL-MCNC: 0.66 MG/DL (ref 0.5–1.4)
EST. AVERAGE GLUCOSE BLD GHB EST-MCNC: 214 MG/DL
GFR SERPL CREATININE-BSD FRML MDRD: >60 ML/MIN/1.73 M 2
GFR SERPL CREATININE-BSD FRML MDRD: >60 ML/MIN/1.73 M 2
GLUCOSE BLD-MCNC: 108 MG/DL (ref 65–99)
GLUCOSE BLD-MCNC: 123 MG/DL (ref 65–99)
GLUCOSE BLD-MCNC: 160 MG/DL (ref 65–99)
GLUCOSE BLD-MCNC: 174 MG/DL (ref 65–99)
GLUCOSE BLD-MCNC: 199 MG/DL (ref 65–99)
GLUCOSE BLD-MCNC: 235 MG/DL (ref 65–99)
GLUCOSE BLD-MCNC: 272 MG/DL (ref 65–99)
GLUCOSE BLD-MCNC: 292 MG/DL (ref 65–99)
GLUCOSE BLD-MCNC: 70 MG/DL (ref 65–99)
GLUCOSE BLD-MCNC: 91 MG/DL (ref 65–99)
GLUCOSE SERPL-MCNC: 100 MG/DL (ref 65–99)
GLUCOSE SERPL-MCNC: 116 MG/DL (ref 65–99)
HBA1C MFR BLD: 9.1 % (ref 0–5.6)
HDLC SERPL-MCNC: 30 MG/DL
LDLC SERPL CALC-MCNC: 114 MG/DL
PHOSPHATE SERPL-MCNC: 3.6 MG/DL (ref 2.5–4.5)
PHOSPHATE SERPL-MCNC: 3.8 MG/DL (ref 2.5–4.5)
POTASSIUM SERPL-SCNC: 3.3 MMOL/L (ref 3.6–5.5)
POTASSIUM SERPL-SCNC: 3.9 MMOL/L (ref 3.6–5.5)
SODIUM SERPL-SCNC: 135 MMOL/L (ref 135–145)
SODIUM SERPL-SCNC: 135 MMOL/L (ref 135–145)
TRIGL SERPL-MCNC: 98 MG/DL (ref 0–149)

## 2017-04-27 PROCEDURE — 700117 HCHG RX CONTRAST REV CODE 255: Performed by: HOSPITALIST

## 2017-04-27 PROCEDURE — 80048 BASIC METABOLIC PNL TOTAL CA: CPT

## 2017-04-27 PROCEDURE — 74177 CT ABD & PELVIS W/CONTRAST: CPT

## 2017-04-27 PROCEDURE — A9270 NON-COVERED ITEM OR SERVICE: HCPCS | Performed by: HOSPITALIST

## 2017-04-27 PROCEDURE — 82962 GLUCOSE BLOOD TEST: CPT

## 2017-04-27 PROCEDURE — 82010 KETONE BODYS QUAN: CPT

## 2017-04-27 PROCEDURE — 770006 HCHG ROOM/CARE - MED/SURG/GYN SEMI*

## 2017-04-27 PROCEDURE — 700102 HCHG RX REV CODE 250 W/ 637 OVERRIDE(OP): Performed by: HOSPITALIST

## 2017-04-27 PROCEDURE — 80061 LIPID PANEL: CPT

## 2017-04-27 PROCEDURE — 700105 HCHG RX REV CODE 258: Performed by: HOSPITALIST

## 2017-04-27 PROCEDURE — 700111 HCHG RX REV CODE 636 W/ 250 OVERRIDE (IP): Performed by: HOSPITALIST

## 2017-04-27 PROCEDURE — 99233 SBSQ HOSP IP/OBS HIGH 50: CPT | Performed by: HOSPITALIST

## 2017-04-27 PROCEDURE — 84100 ASSAY OF PHOSPHORUS: CPT

## 2017-04-27 RX ORDER — GLIPIZIDE 10 MG/1
10 TABLET ORAL 2 TIMES DAILY
Qty: 60 TAB | Refills: 0 | Status: SHIPPED | OUTPATIENT
Start: 2017-04-27 | End: 2017-04-27

## 2017-04-27 RX ORDER — GABAPENTIN 300 MG/1
300 CAPSULE ORAL 3 TIMES DAILY
Qty: 90 CAP | Refills: 0 | Status: ON HOLD | OUTPATIENT
Start: 2017-04-27 | End: 2017-05-25

## 2017-04-27 RX ORDER — GLIPIZIDE 5 MG/1
5 TABLET ORAL
Qty: 60 TAB | Refills: 0 | Status: ON HOLD | OUTPATIENT
Start: 2017-04-27 | End: 2017-05-25

## 2017-04-27 RX ORDER — OXYCODONE HYDROCHLORIDE 5 MG/1
5 TABLET ORAL EVERY 4 HOURS PRN
Status: DISCONTINUED | OUTPATIENT
Start: 2017-04-27 | End: 2017-04-28 | Stop reason: HOSPADM

## 2017-04-27 RX ORDER — GLIPIZIDE 5 MG/1
5 TABLET ORAL
Status: DISCONTINUED | OUTPATIENT
Start: 2017-04-27 | End: 2017-04-27

## 2017-04-27 RX ORDER — OXYCODONE HYDROCHLORIDE 10 MG/1
10 TABLET ORAL EVERY 4 HOURS PRN
Status: DISCONTINUED | OUTPATIENT
Start: 2017-04-27 | End: 2017-04-28 | Stop reason: HOSPADM

## 2017-04-27 RX ORDER — ZOLPIDEM TARTRATE 5 MG/1
5 TABLET ORAL
Qty: 30 TAB | Refills: 0 | Status: ON HOLD | OUTPATIENT
Start: 2017-04-27 | End: 2017-05-25

## 2017-04-27 RX ORDER — POTASSIUM CHLORIDE 7.45 MG/ML
10 INJECTION INTRAVENOUS ONCE
Status: COMPLETED | OUTPATIENT
Start: 2017-04-27 | End: 2017-04-27

## 2017-04-27 RX ORDER — SODIUM CHLORIDE 9 MG/ML
INJECTION, SOLUTION INTRAVENOUS CONTINUOUS
Status: DISCONTINUED | OUTPATIENT
Start: 2017-04-27 | End: 2017-04-28 | Stop reason: HOSPADM

## 2017-04-27 RX ADMIN — GABAPENTIN 300 MG: 300 CAPSULE ORAL at 07:33

## 2017-04-27 RX ADMIN — INSULIN LISPRO 4 UNITS: 100 INJECTION, SOLUTION INTRAVENOUS; SUBCUTANEOUS at 19:29

## 2017-04-27 RX ADMIN — HEPARIN SODIUM 5000 UNITS: 5000 INJECTION, SOLUTION INTRAVENOUS; SUBCUTANEOUS at 15:14

## 2017-04-27 RX ADMIN — GABAPENTIN 300 MG: 300 CAPSULE ORAL at 15:14

## 2017-04-27 RX ADMIN — OXYCODONE HYDROCHLORIDE 10 MG: 10 TABLET ORAL at 11:01

## 2017-04-27 RX ADMIN — IOHEXOL 50 ML: 240 INJECTION, SOLUTION INTRATHECAL; INTRAVASCULAR; INTRAVENOUS; ORAL at 10:35

## 2017-04-27 RX ADMIN — SODIUM CHLORIDE: 9 INJECTION, SOLUTION INTRAVENOUS at 03:20

## 2017-04-27 RX ADMIN — INSULIN HUMAN 20 UNITS: 100 INJECTION, SUSPENSION SUBCUTANEOUS at 03:46

## 2017-04-27 RX ADMIN — HEPARIN SODIUM 5000 UNITS: 5000 INJECTION, SOLUTION INTRAVENOUS; SUBCUTANEOUS at 21:06

## 2017-04-27 RX ADMIN — POTASSIUM CHLORIDE 10 MEQ: 7.46 INJECTION, SOLUTION INTRAVENOUS at 06:20

## 2017-04-27 RX ADMIN — SODIUM CHLORIDE: 9 INJECTION, SOLUTION INTRAVENOUS at 15:14

## 2017-04-27 RX ADMIN — IOHEXOL 100 ML: 350 INJECTION, SOLUTION INTRAVENOUS at 10:35

## 2017-04-27 RX ADMIN — POTASSIUM CHLORIDE 10 MEQ: 7.46 INJECTION, SOLUTION INTRAVENOUS at 00:54

## 2017-04-27 RX ADMIN — ACETAMINOPHEN 650 MG: 325 TABLET, FILM COATED ORAL at 07:28

## 2017-04-27 RX ADMIN — OXYCODONE HYDROCHLORIDE 5 MG: 5 TABLET ORAL at 21:24

## 2017-04-27 RX ADMIN — HEPARIN SODIUM 5000 UNITS: 5000 INJECTION, SOLUTION INTRAVENOUS; SUBCUTANEOUS at 05:32

## 2017-04-27 RX ADMIN — OXYCODONE HYDROCHLORIDE 5 MG: 5 TABLET ORAL at 17:34

## 2017-04-27 RX ADMIN — INSULIN LISPRO 3 UNITS: 100 INJECTION, SOLUTION INTRAVENOUS; SUBCUTANEOUS at 11:59

## 2017-04-27 RX ADMIN — STANDARDIZED SENNA CONCENTRATE AND DOCUSATE SODIUM 2 TABLET: 8.6; 5 TABLET, FILM COATED ORAL at 07:33

## 2017-04-27 RX ADMIN — ZOLPIDEM TARTRATE 5 MG: 5 TABLET, FILM COATED ORAL at 21:24

## 2017-04-27 RX ADMIN — POTASSIUM CHLORIDE 10 MEQ: 7.46 INJECTION, SOLUTION INTRAVENOUS at 05:18

## 2017-04-27 RX ADMIN — STANDARDIZED SENNA CONCENTRATE AND DOCUSATE SODIUM 2 TABLET: 8.6; 5 TABLET, FILM COATED ORAL at 21:03

## 2017-04-27 RX ADMIN — POTASSIUM CHLORIDE 10 MEQ: 7.46 INJECTION, SOLUTION INTRAVENOUS at 03:20

## 2017-04-27 RX ADMIN — DEXTROSE AND SODIUM CHLORIDE: 10; .45 INJECTION, SOLUTION INTRAVENOUS at 02:32

## 2017-04-27 RX ADMIN — NICOTINE 14 MG: 14 PATCH, EXTENDED RELEASE TRANSDERMAL at 05:31

## 2017-04-27 RX ADMIN — GLIPIZIDE 5 MG: 5 TABLET ORAL at 08:11

## 2017-04-27 RX ADMIN — GABAPENTIN 300 MG: 300 CAPSULE ORAL at 21:03

## 2017-04-27 RX ADMIN — DEXTROSE AND SODIUM CHLORIDE: 5; .45 INJECTION, SOLUTION INTRAVENOUS at 00:07

## 2017-04-27 RX ADMIN — SODIUM CHLORIDE: 9 INJECTION, SOLUTION INTRAVENOUS at 21:06

## 2017-04-27 RX ADMIN — POTASSIUM CHLORIDE 10 MEQ: 7.46 INJECTION, SOLUTION INTRAVENOUS at 04:21

## 2017-04-27 ASSESSMENT — PAIN SCALES - GENERAL
PAINLEVEL_OUTOF10: 0
PAINLEVEL_OUTOF10: 0
PAINLEVEL_OUTOF10: 5
PAINLEVEL_OUTOF10: 0
PAINLEVEL_OUTOF10: 3

## 2017-04-27 ASSESSMENT — ENCOUNTER SYMPTOMS
HEADACHES: 0
FEVER: 0
BACK PAIN: 1
CHILLS: 0
SHORTNESS OF BREATH: 0
ABDOMINAL PAIN: 1
COUGH: 0
LOSS OF CONSCIOUSNESS: 0
NAUSEA: 0
VOMITING: 0
DIARRHEA: 0
DIZZINESS: 0

## 2017-04-27 NOTE — PROGRESS NOTES
Notified Dr. Harding of patient readiness to transition from DKA to SSI per protocol. Orders received noted and verified. Notified pharmacy of transition. Susie per md to dc insulin gtt at this time.

## 2017-04-27 NOTE — ED PROVIDER NOTES
ED Provider Note    HPI: Patient is a 31-year-old male who presented to the emergency department April 26, 2017 5:47 PM chief complaint of shortness of breath and high blood sugar.    Patient also notes some intermittent flank pain for several weeks. Patient has run out of his insulin. Patient has not had fever chills or cough. No headache no neck stiffness no photophobia. Patient notes polyuria and polydipsia. Pain is described as crampy and intermittent in both flanks. Nothing seems to make it better or worse. No other somatic complaints    Review of Systems: Positive for polyuria polydipsia intermittent crampy flank pain for the last several weeks negative for fever chills cough headache neck stiffness photophobia. Review of systems reviewed the patient, all other systems negative    Past medical/surgical history: Diabetes seizure    Medications: Insulin, But patient is noncompliant    Allergies: None    Social History: Patient smokes one pack of cigarettes per day social user of alcohol      Physical exam: Constitutional: Slender male awake and alert and appeared tired  Vital signs:  Temperature 97.5 pulse 120 respirations 17 and blood pressure 132/69 pulse oximetry 98%  EYES: PERRL, EOMI, Conjunctivae and sclera normal, eyelids normal bilaterally.  Neck: Trachea midline. No cervical masses seen or palpated. Normal range of motion, supple. No meningeal signs elicited.  Cardiac: Regular rate and rhythm. S1-S2 present. No S3 or S4 present. No murmurs, rubs, or gallops heard. No edema or varicosities were seen.   Lungs: Clear to auscultation with good aeration throughout. No wheezes, rales, or rhonchi heard. Patient's chest wall moved symmetrically with each respiratory effort. Patient was not making use of accessory muscles of respiration in breathing.  Abdomen: Soft nontender to palpation. No rebound or guarding elicited. No organomegaly identified. No pulsatile abdominal masses identified.   Musculoskeletal:  no   pain with palpitation or movement of muscle, bone or joint , no obvious musculoskeletal deformities identified.  Neurologic: alert and awake answers questions appropriately. Moves all four extremities independently, no gross focal abnormalities identified. Normal strength and motor.  Skin: no rash or lesion seen, no palpable dermatologic lesions identified. Mucous membranes appear dry.  Psychiatric: not anxious, delusional, or hallucinating.    Medical decision making:  IV was started the patient was given a 3 L bolus of 0.9 normal saline    Laboratory studies were obtained (please see lab sheet for all results) significant findings included normal lipase making pancreatitis unlikely. Marked hyperglycemia blood sugar 658, dehydrationand BUN 24. Bicarbonate low limit of normal at 20, however anion gap of 15 was present. Urinalysis showed no evidence of infection. Beta hydroxybutyric acid markedly elevated at 3.94. CBC unremarkable    Patient will be admitted by hospitalist service. Given the degree of his dehydration and acidosis I do consider him to be critically ill.    Please note that I spent 35 minutes in direct care of this critically ill patient. This time was spent evaluating the patient, reviewing test results, discussion with consultants, and preparing the medical record    Impression 1) DKA  2) noncompliance with medical regimen  3) critical care greater than 30 minutes

## 2017-04-27 NOTE — DISCHARGE PLANNING
Diabetic ketoacidosis.     31-year-old male diabetic. Single [place] resident.  Recently released from custodial.  Did not get any insulin at the time of release. He has not seen any physician.   Request to see patient regarding Rx assistance for DC to prevent re-admit.  He is pending [place] Medicaid.  He tells me he is Unemployed and no Money/income.     Request for patient financial assistance completed and approved by Janee.     Zolpidem $7.91.     Insulin Humalog $7.59  Insulin 70/30 Novolin $7. 60.  Gabapentin $14.68  .   Cost of Service:  Total cost $37.38      Request for Financial assistance for Test strips and lancets. [address]  $52.85  Approved by Janee.   Cab voucher provided in support of transportation hardship to scheduled [facility] appt Tuesday 5/2 at 8AM.      All assistance reviewed with patient.

## 2017-04-27 NOTE — ED NOTES
Lab has been called regarding lipase results, estimated result time is 30 minutes. Pt is resting on his gurney waiting for further poc, father is at bedside.

## 2017-04-27 NOTE — CARE PLAN
Problem: Knowledge Deficit  Goal: Knowledge of disease process/condition, treatment plan, diagnostic tests, and medications will improve  Outcome: PROGRESSING AS EXPECTED  Pt is receiving education from RN and interdisciplinary team regarding Diabetes management, and pertinent tests and procedures.  Pt is engaged with team and asks many questions    Problem: Fluid Volume:  Goal: Will maintain balanced intake and output  Outcome: PROGRESSING AS EXPECTED  Pt is receiving IV fluids and will consume fluids with breakfast

## 2017-04-27 NOTE — CONSULTS
Diabetes education: Met with Paras, over three visits, to clarify home regime, discharge needs and education. Pt was very sleepy, for the the two visits. States he has a hx of diabetes, on insulin, originally larger in weight than now. Pt states he had been in half-way for 7 months recently released on Monday. Pt states he received insulin in half-way, they also started glipizide at that time. Pt states upon release he did not have any medications nor means to obtain medications.   Pt states he needed to be discharged by 2:30 today, to meet with SW and son for a visit at [facility]. Pt had since called and was informed he could postpone the visit until next week if he needed to stay in the hospital. CDE spoke with Dr Bro regarding concern for discharge (pt groggy, not sure of insulin and meter) and 2:30 visit rescheduled.   Spoke with patient regarding holding discharge. Pt states the mother of his son has no where to go after work at 0400. Pt states he was to go to his mother's house ( and if not discharged, no one would be there to let her in as his mother leaves for work at 0400), with his brother picking him up at discharge. Spoke with Clovis VIZCAINO regarding this as well as Mingo WALDRON.  Mingo WALDRON, will meet with pt to discuss discharge needs as well as need for letter or call to CPS SW.  Pt does not know name of meter ( cannot even confirm he has one) to check on strips.  [facility] with [facility] pharmacy and they have strips for True Metrix ( $44.00 for  100 strips) need authorization and prescription. Spoke with Mingo WALDRON who get RX from MD. CDE will bring meter, instruct on same as well as review ability and to draw up and inject insulin as well as knowledge of same, after he wakes up more . Please call [phone] when pt more awake.  Pt received 20 units of 70/30 insulin at 0346 this am, with insulin drip stopped at 0314. Finger stick at 0600 was 123. Pt recieved Glipizide 5 mg at 0700. Finger stick at 1159 was 199 ( 3  units).  Plan: Please call [phone] when pt awake enough for education on meter and insulin. Pt needs to have prescription for True Metrix strips and lancets to test 4 times a day. Not sure why patient is on Glipizide (off insulin for short period and came in DKA, type one??).

## 2017-04-27 NOTE — CARE PLAN
Problem: Communication  Goal: The ability to communicate needs accurately and effectively will improve  Outcome: PROGRESSING AS EXPECTED  Patient aaox4  Admission orientation completed        Problem: Safety  Goal: Will remain free from injury  Outcome: PROGRESSING AS EXPECTED  Assisted with mobility: patient is a standby assist  Fall precautions in place

## 2017-04-27 NOTE — PROGRESS NOTES
Diabetes education: Met with Paras again. Pt more awake and engaged in conversation. Insulin management reviewed. Pt states before group home, he would test his blood sugars 3 to 4 times a day.  AC breakfast and dinner he would take his dose of 70/30, ac all meals he would give Novolog in addition as ordered if blood sugars were above 150. Pt states he mixed 70/30 with Novolog or regular but drawing up cloudy (70/30) first. Discussed need for clear to cloudy with Humalog/Novolog/regular first.   Pt was given and instructed on True Metrix meter. Handouts given.  Pt states he was dx with type one diabetes in 2012 ( old chart states type one diabetes). Glipizide not recommended ( or needed ) for type one diabetes.  Reviewed need to  strips as well as medications from [facility] center pharmacy at discharge (tomorrow am or if insists on going today before 5 pm when pharmacy closes).  Clovis VIZCAINO spoke with patient regarding issue of mother of his son meeting with him after she gets off of work at 0400.  Plan: Pt needs to  prescriptions from [facility] center pharmacy before it closes today at 5 or tomorrow (depending on discharge). Sliding scale for Humalog needs to be written out and for ac only. Pt given a Humalog coupon for use in next prescription. Please call [phone] if needs change.

## 2017-04-27 NOTE — ED NOTES
Lab has been called regarding CMP results, lab stated they needed to repeat tests and results will be available shortly.

## 2017-04-27 NOTE — ED NOTES
Pt is requesting to go outside and get some fresh air before he can decide he will accept medical advice to be admitted. The hospitalist stated he would prefer the pt to stay in the ER unless he signs an AMA and leaves but states he would like to consult charge RN.

## 2017-04-27 NOTE — PROGRESS NOTES
Pt transported off unit by transport personnel per policy.  Pt left with all belongings.  2PIVs in place and patent.  Called report to shireen VIZCAINO.  Answered pt questions.

## 2017-04-27 NOTE — ED NOTES
Pt amb to triage.  Chief Complaint   Patient presents with   • Shortness of Breath   • RUQ Pain     radiates to flank   • High Blood Sugar      , ran out of insulin 2d ago     Charge RN aware of pt.

## 2017-04-27 NOTE — ED NOTES
Pt has been educated on the reason that he is not allowed to leave the ER unless he signs an AMA and formally discharges. Pt verbalized an understanding and has agreed to be admitted. Admitting RN has been notified of the pt's readness to move to the floor.

## 2017-04-27 NOTE — CARE PLAN
Problem: Nutritional:  Goal: Patient to verbalize or demonstrate understanding of diet  Outcome: MET Date Met:  04/27/17  Provided patient with Diabetes Education handout and explanation.

## 2017-04-27 NOTE — PROGRESS NOTES
Hospital Medicine Progress Note, Adult, Complex               Author: Jaquan Bro Date & Time created: 4/27/2017  12:49 PM     Interval History:  30yo TIDM just released from [facility] with no insulin.  Presented 4/26 to ED in DKA    This am feeling much better.  No N or V.  Some abd pain which is difuse and constant for last 4-5 months.  No other complaints    Review of Systems:  Review of Systems   Constitutional: Negative for fever and chills.   Respiratory: Negative for cough and shortness of breath.    Cardiovascular: Negative for chest pain.   Gastrointestinal: Positive for abdominal pain. Negative for nausea, vomiting and diarrhea.   Musculoskeletal: Positive for back pain.        Burning leg pain   Skin: Negative for rash.   Neurological: Negative for dizziness, loss of consciousness and headaches.       Physical Exam:  Physical Exam   Constitutional: He is oriented to person, place, and time. He appears well-developed and well-nourished. No distress.   HENT:   Head: Normocephalic and atraumatic.   Eyes: Conjunctivae are normal.   Neck: No JVD present.   Cardiovascular: Normal rate.  Exam reveals no gallop.    No murmur heard.  Pulmonary/Chest: Effort normal. No stridor. No respiratory distress. He has no wheezes. He has no rales.   Abdominal: Soft. There is no tenderness. There is no rebound and no guarding.   Musculoskeletal: He exhibits no edema.   Neurological: He is oriented to person, place, and time.   Skin: Skin is warm and dry. No rash noted. He is not diaphoretic.   Psychiatric: He has a normal mood and affect. Thought content normal.   Nursing note and vitals reviewed.      Labs:        Invalid input(s): [id number]      Recent Labs      04/26/17   1834  04/26/17   2303  04/27/17   0216  04/27/17   0814   SODIUM  130*  134*  135  135   POTASSIUM  4.7  5.0  3.3*  3.9   CHLORIDE  95*  104  107  107   CO2  20  22  24  24   BUN  24*  19  19  21   CREATININE  1.08  0.78  0.66  0.63    MAGNESIUM  2.0   --    --    --    PHOSPHORUS   --   3.8   --   3.6   CALCIUM  9.4  8.7  8.8  8.9     Recent Labs      17   2303  17   0216  17   0814   ALTSGPT  10  12   --    --    ASTSGOT  8*  15   --    --    ALKPHOSPHAT  84  76   --    --    TBILIRUBIN  0.9  0.7   --    --    LIPASE  4*   --    --    --    GLUCOSE  658*  284*  100*  116*     Recent Labs      17   2303   RBC  5.30  4.81   HEMOGLOBIN  15.7  14.1   HEMATOCRIT  46.5  41.8*   PLATELETCT  302  230     Recent Labs      173   WBC  6.9  6.0   NEUTSPOLYS  71.80  54.90   LYMPHOCYTES  20.30*  34.90   MONOCYTES  6.30  7.30   EOSINOPHILS  0.70  2.00   BASOPHILS  0.60  0.70   ASTSGOT  8*  15   ALTSGPT  10  12   ALKPHOSPHAT  84  76   TBILIRUBIN  0.9  0.7           Hemodynamics:  Temp (24hrs), Av.3 °C (97.3 °F), Min:36 °C (96.8 °F), Max:36.4 °C (97.5 °F)  Temperature: 36 °C (96.8 °F)  Pulse  Av.3  Min: 93  Max: 120Heart Rate (Monitored): 97  Blood Pressure: 132/69 mmHg, NIBP: 127/85 mmHg     Respiratory:    Respiration: (!) 21, Pulse Oximetry: 96 %           Fluids:    Intake/Output Summary (Last 24 hours) at 17 1249  Last data filed at 17 0800   Gross per 24 hour   Intake 3482.93 ml   Output    500 ml   Net 2982.93 ml     Weight: 70.4 kg (155 lb 3.3 oz)  GI/Nutrition:  Orders Placed This Encounter   Procedures   • DIET ORDER     Standing Status: Standing      Number of Occurrences: 1      Standing Expiration Date:      Order Specific Question:  Diet:     Answer:  Diabetic [3]     Medical Decision Making, by Problem:  Active Hospital Problems    Diagnosis   • Type I diabetes mellitus (CMS-HCC) [E10.9]  On  20u bid and SSI bid a home   • DKA (diabetic ketoacidoses) (CMS-HCC) [E13.10]  Released from residential with no insulin hence in DKA  CO2 and AG now normalized  Resume NPH 20u BID and SSI  Diabetic educator consult  Diabetic diet  Follow overnight and  dc in am if doing well   -abd pain: x 4 months.  Pattern consistent with gastroparesis.  Check CT to r/o other pathology.  Exam benign  -Peripheral Neuropathy: start gabapentin    Medications reviewed, EKG reviewed, Labs reviewed and Radiology images reviewed  Conner catheter: No Conner      DVT Prophylaxis: Heparin  DVT prophylaxis - mechanical: SCDs

## 2017-04-27 NOTE — H&P
CHIEF COMPLAINT:  Shortness of breath with right flank pain.    PRIMARY CARE PHYSICIAN:  Unassigned.    ADM[facility]:  [facility], Dr. Harding.    DIAGNOSIS:  Diabetic ketoacidosis.    HISTORY OF CURRENT ILLNESS:  The patient is a 31-year-old diabetic who was   [facility]  Apparently, he did not get any insulin at the   time of release, according to the patient.  He has not seen any physician   since then.  The patient's blood sugars on initial evaluation are 658, on   repeat it is 569.  The patient has at this point elevated beta hydroxybutyric   acid levels.  He will be admitted at this point to the ICU for diabetic   ketoacidosis management and treatment.    PAST MEDICAL HISTORY:  Diabetes and history of seizures.    PAST SURGICAL HISTORY:  None.    ALLERGIES:  No known drug allergies.    MEDICATIONS:  Medications at the time of admission include ibuprofen 800 mg   every 6 hours p.r.n. for pain, insulin 70/30, 20 units twice daily and then   Insulin aspart per sliding scale, but apparently he has not been taking those   due to not having the medications.    SOCIAL HISTORY:  He is a current everyday smoker.  He drinks alcohol socially.    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    FAMILY HISTORY:  At this point and per the patient is noncontributory.    REVIEW OF SYSTEMS:  He complains at this point of sharp pain in the stomach,   right flank pain, neuropathic pain of both lower extremities, testicular pain,   and pain in the hands.  Denies nausea, vomiting, headache, chest pain,   shortness of breath.  All other review of systems were reviewed and are   negative.    PHYSICAL EXAMINATION:  VITAL SIGNS:  Temperature 36.4 degrees Celsius, pulse 109, respirations 26,   blood pressure 132/69, he is 69 kilograms in weight, 177.8 cm tall.  GENERAL:  He is currently alert, awake, oriented x3, pleasant, cooperative   gentleman who appears his stated age.  HEENT:  Head is  atraumatic.  Eyes follow in normal range of gaze.  Pupils are   equal, round, reactive bilaterally.  Nose midline without discharge.  Ears   bilaterally intact without discharge.  Oral cavity, moist mucous membranes.    No apparent focus infection in the oral cavity.  NECK:  Soft and supple.  No JVD, carotid bruit, thyromegaly, or   lymphadenopathy appreciated.  CHEST:  Chest wall moves equal with inspiration and expiration.  No   paradoxical motion.  No reproducible chest wall tenderness.  HEART:  S1, S2.  No murmurs or gallops detected.  LUNGS:  Clear to auscultation.  No rales or rhonchi detected.  ABDOMEN:  Soft.  Bowel sounds present in all 4 quadrants.  No hepatomegaly, no   splenomegaly.  There is some tenderness elicited in the paraumbilical region   and the right lower quadrant.  The patient then also has pain in the right   flank.  GENITAL:  Deferred.  RECTAL:  Deferred.  EXTREMITIES:  Upper and lower extremities, positive pulses.  Good muscle   strength.  Good muscle tone.  Positive deep tendon reflexes.  No edema.  He   does have some decreased sensation in both lower extremities to touch.  NEUROLOGIC:  Cranial nerves II-XII grossly within normal limits without any   focal deficits appreciated.  SKIN:  Multiple tattoos, otherwise no rashes or abrasions identified.    LABORATORY EVALUATION:  WBC count 6.9, hemoglobin 15.7, hematocrit 46.5,   platelets are 302.  Sodium 130, potassium 4.7, chloride 95, CO2 20, BUN 24,   creatinine 1, calcium is 9.4 with a glucose 658, AST 8, ALT 10, alkaline   phosphatase 84, total bilirubin 0.9.  Urinalysis shows positive for glucose   above 1000, ketones 80, otherwise no indication of an infection.    IMPRESSION AND PLAN:  1.  Diabetic ketoacidosis.  I am going to put him on an insulin bolus than an   insulin drip.  I am going to give him fluids.  We will start with normal   saline, 2 liter bolus, then give him third liter at 250 per hour, then switch   over to half  normal saline until his blood sugars started declining, at that   point we will switch him over to D5 half NS.  Once his blood sugars get under   100, we will continue at that point with the 10 half NS and then get his blood   sugars corrected.  Once he is able to be off the insulin drip, we will get   him off the fluid drip and then at that point he will be able start on a   diabetic diet and Accu-Cheks with sliding scale coverage.  2.  For his electrolytes.  We will continue monitoring and correcting   electrolytes.  3.  For his peripheral neuropathy.  I am going to start him on gabapentin.  4.  For his abdominal pain and flank pain, we will get at this point a CT of   the abdomen.  The patient will be on deep venous thrombosis and gastroesophageal reflux   disease prophylaxis.  The patient is full admission to the ICU.       ____________________________________     MD PATSY DEVINE / TERESA    DD:  04/26/2017 22:11:21  DT:  04/26/2017 22:34:52    D#:  [id number]  Job#:  [id number]

## 2017-04-28 VITALS
DIASTOLIC BLOOD PRESSURE: 91 MMHG | HEART RATE: 97 BPM | RESPIRATION RATE: 18 BRPM | WEIGHT: 155.2 LBS | SYSTOLIC BLOOD PRESSURE: 113 MMHG | TEMPERATURE: 98.5 F | BODY MASS INDEX: 22.22 KG/M2 | OXYGEN SATURATION: 95 % | HEIGHT: 70 IN

## 2017-04-28 LAB
GLUCOSE BLD-MCNC: 260 MG/DL (ref 65–99)
GLUCOSE BLD-MCNC: 280 MG/DL (ref 65–99)
GLUCOSE BLD-MCNC: 280 MG/DL (ref 65–99)

## 2017-04-28 PROCEDURE — 700102 HCHG RX REV CODE 250 W/ 637 OVERRIDE(OP): Performed by: HOSPITALIST

## 2017-04-28 PROCEDURE — A9270 NON-COVERED ITEM OR SERVICE: HCPCS | Performed by: HOSPITALIST

## 2017-04-28 PROCEDURE — 82962 GLUCOSE BLOOD TEST: CPT

## 2017-04-28 PROCEDURE — 700111 HCHG RX REV CODE 636 W/ 250 OVERRIDE (IP): Performed by: HOSPITALIST

## 2017-04-28 PROCEDURE — 99239 HOSP IP/OBS DSCHRG MGMT >30: CPT | Performed by: INTERNAL MEDICINE

## 2017-04-28 RX ADMIN — HEPARIN SODIUM 5000 UNITS: 5000 INJECTION, SOLUTION INTRAVENOUS; SUBCUTANEOUS at 05:13

## 2017-04-28 RX ADMIN — OXYCODONE HYDROCHLORIDE 10 MG: 10 TABLET ORAL at 05:13

## 2017-04-28 RX ADMIN — OXYCODONE HYDROCHLORIDE 10 MG: 10 TABLET ORAL at 09:51

## 2017-04-28 RX ADMIN — OXYCODONE HYDROCHLORIDE 10 MG: 10 TABLET ORAL at 14:54

## 2017-04-28 RX ADMIN — NICOTINE 14 MG: 14 PATCH, EXTENDED RELEASE TRANSDERMAL at 05:13

## 2017-04-28 RX ADMIN — INSULIN HUMAN 20 UNITS: 100 INJECTION, SUSPENSION SUBCUTANEOUS at 08:09

## 2017-04-28 RX ADMIN — ACETAMINOPHEN 650 MG: 325 TABLET, FILM COATED ORAL at 07:55

## 2017-04-28 RX ADMIN — GABAPENTIN 300 MG: 300 CAPSULE ORAL at 09:48

## 2017-04-28 ASSESSMENT — LIFESTYLE VARIABLES
DO YOU DRINK ALCOHOL: NO
EVER_SMOKED: YES

## 2017-04-28 ASSESSMENT — PAIN SCALES - GENERAL
PAINLEVEL_OUTOF10: 4
PAINLEVEL_OUTOF10: 5

## 2017-04-28 NOTE — DISCHARGE PLANNING
CHIVO obtain approved service from Janee regarding pt's diabetic needles for $43.21 from the  Pharmacy.

## 2017-04-28 NOTE — PROGRESS NOTES
Received alert and oriented x 4, up self, c/o bilateral leg neuropathy, due med given as ordered, awaiting NPH from 1700 pharmacy sent multiple messages, needs attended, will continue to monitor.

## 2017-04-28 NOTE — PROGRESS NOTES
Patient verbalized understanding of discharge instructions. Paper has d/c paperwork and prescriptions in hand. Patient d/c to home.

## 2017-04-28 NOTE — PROGRESS NOTES
Pt. Arrived to unit via wheelchair from Psychiatric. Pt. Ambulated to bed with standby assistance, a/ox4, VSS, oriented to new unit and room and questions answered. Skin is intact. Hourly rounding is in place.

## 2017-04-28 NOTE — PROGRESS NOTES
"Assumed care of patient at 0700. Received report from RN. Patient is AOX4. Assessment complete. Labs reviewed.Patient and RN discussed plan of care. Patient questions answered. Patient needs are met at this time. Bed in lowest and locked position. Upper bed rails up.  Call light is within reach. Hourly rounding in place.  /91 mmHg  Pulse 97  Temp(Src) 36.9 °C (98.5 °F)  Resp 18  Ht 1.778 m (5' 10\")  Wt 70.4 kg (155 lb 3.3 oz)  BMI 22.27 kg/m2  SpO2 95%    "

## 2017-04-28 NOTE — DISCHARGE SUMMARY
Hospital Medicine Discharge Note     Admit Date:  4/26/2017       Discharge Date:   4/28/2017  LOS: 2 days     Primary Care Provider:    Pcp Pt States None    Attending Physician:  Casey Alan     Discharge Diagnoses:     DKA (diabetic ketoacidoses) (CMS-HCC)        Chronic Medical Problems:    Type I diabetes mellitus (CMS-HCC)    Consultants:      None    Studies:  Wbc 6, hemoglobin 14.1, Hematocrit 41.8, Platelet 230, Sodium 135, Chloride 107, Bicarb 24, Glucose 116, BUN 21, Creatinine 0.63    Imaging/ Testing:      CT-ABDOMEN-PELVIS WITH   Final Result         1. No definite acute inflammatory change identified in the abdomen or pelvis.      2. Mild apparent diffuse wall thickening of the urinary bladder could be due to underdistention or cystitis. Correlate with UA.            Procedures:        None    Hospital Summary (Brief Narrative):       For H and P on admission, please refer to full H and P dictated by Dr. Harding   In brief, Paras Lamb is a  31 y.o. male who was admitted 4/26/2017 after presenting to ER complaining of SOB and right flank pain, and found to be in DKA. Pt was  recently released from long term.  Apparently, he did not get any insulin at the  time of release, according to the patient.  He has not seen any physician  since then.  The patient's blood sugars on initial evaluation were 658. A CT abdomen was done and was negative for acute issues. Pt was admitted  To critical care unit and was started on DKA protocol. His DKA resolved and also his symptoms resolved. Pt has noww been hemodynamically stable. Diabetic educator was consulted and all his diabetic medication have been arranged so he can get them at  [facility] pharmacy. Pt will be discharged home today.       Disposition:   Discharge home    Condition:  Stable    Activity:   As tolerated     Diet:  Diabetic Diet    Discharge Medications:           Instructions    gabapentin 300 MG Caps   Last time this was given:  300 mg on 4/28/2017   9:48 AM   Commonly known as:  NEURONTIN    Take 1 Cap by mouth 3 times a day.   Dose:  300 mg       zolpidem 5 MG Tabs   Last time this was given:  5 mg on 4/27/2017  9:24 PM   Commonly known as:  AMBIEN    Take 1 Tab by mouth at bedtime as needed.  May repeat 1 time..   Dose:  5 mg       * insulin 70/30 (70-30) 100 UNIT/ML Susp   What changed:  Another medication with the same name was added. Make sure you understand how and when to take each.   Last time this was given:  20 Units on 4/27/2017  3:46 AM   Commonly known as:  HUMULIN,NOVOLIN    Inject 20 Units as instructed 2 Times a Day.   Dose:  20 Units         Instructions    insulin aspart 100 UNIT/ML Soln   Commonly known as:  NOVOLOG    Inject 2-10 Units as instructed 3 times a day before meals. Pt takes: 151-200 2 units 201-250 4 units 251-300 6 units 301-350 8 units 351-400 10 units >400 call MD   Dose:  2-10 Units               Follow up appointment details :      I encouraged him to call his PCP to confirm follow up after discharge.      Instructions:      The were given instructions to return to the ER if patient's condition worsens      Time Spent on Discharge:     Discharge instructions were discussed with the patient at bedside. Patient  expressed understanding and agreed to comply with all discharge instructions.    41 minutes were spent in the discharge planning and management of this  patient, including more than 50% of the time spent face to face in   Counseling.

## 2017-04-28 NOTE — PROGRESS NOTES
"Diabetes education: Pt had all prescriptions filled and covered through [facility] pharmacy, yesterday in anticipation of discharge yesterday. Discharge held and patient transferred to .   Spoke with patient, who states he does not have syringes ( not on the original orders). Spoke with Sophie WALDRON and hospitalist, prescription obtained and faxed to  pharmacy for approved services by Sophie WALDRON. Pt has original rx as well as directions to  pharmacy. Pt asking questions regarding prescription for pain med ( not ordered) , \"what if I cannot get a ride?\". Explained discharge will be done when ride available ( as long as before pharmacy closes today), \"will I get lunch\" ( explained until discharge he will get all treatment, medications and food as ordered.).  Plan: pt should have other pink sheets with prescriptions from [facility] (medications and strips/lancets). Please make sure pt has directions (written out ) for sliding scale ( prefer ac only). Questions please call [phone].  "

## 2017-04-28 NOTE — DISCHARGE INSTRUCTIONS
Discharge Instructions    Discharged to home by car with self. Discharged via walking, hospital escort: Refused.  Special equipment needed: Not Applicable    Be sure to schedule a follow-up appointment with your primary care doctor or any specialists as instructed.     Discharge Plan:   Diet Plan: Discussed  Activity Level: Discussed  Smoking Cessation Offered: Patient Refused  Confirmed Follow up Appointment: Patient to Call and Schedule Appointment  Confirmed Symptoms Management: Discussed  Medication Reconciliation Updated: Yes  Influenza Vaccine Indication: Not indicated: Previously immunized this influenza season and > 8 years of age    I understand that a diet low in cholesterol, fat, and sodium is recommended for good health. Unless I have been given specific instructions below for another diet, I accept this instruction as my diet prescription.   Other diet: Diabetic Diet    Special Instructions: None    · Is patient discharged on Warfarin / Coumadin?   No     · Is patient Post Blood Transfusion?  No    Depression / Suicide Risk    As you are discharged from this [facility] facility, it is important to learn how to keep safe from harming yourself.    Recognize the warning signs:  · Abrupt changes in personality, positive or negative- including increase in energy   · Giving away possessions  · Change in eating patterns- significant weight changes-  positive or negative  · Change in sleeping patterns- unable to sleep or sleeping all the time   · Unwillingness or inability to communicate  · Depression  · Unusual sadness, discouragement and loneliness  · Talk of wanting to die  · Neglect of personal appearance   · Rebelliousness- reckless behavior  · Withdrawal from people/activities they love  · Confusion- inability to concentrate     If you or a loved one observes any of these behaviors or has concerns about self-harm, here's what you can do:  · Talk about it- your feelings and reasons for harming  yourself  · Remove any means that you might use to hurt yourself (examples: pills, rope, extension cords, firearm)  · Get professional help from the community (Mental Health, Substance Abuse, psychological counseling)  · Do not be alone:Call your Safe Contact- someone whom you trust who will be there for you.  · Call your local CRISIS HOTLINE 244-1435 or 134-918-5775  · Call your local Children's Mobile Crisis Response Team [place] (249) 222-3270 or www.ScanSocial  · Call the toll free National Suicide Prevention Hotlines   · National Suicide Prevention Lifeline 085-560-JGUC (4426)  · National Hope Line Network 800-SUICIDE (596-8882)

## 2017-05-10 NOTE — ADDENDUM NOTE
Encounter addended by: Kelly Helm R.N. on: 5/10/2017  9:36 AM<BR>     Documentation filed: Inpatient Document Flowsheet

## 2017-05-24 ENCOUNTER — HOSPITAL ENCOUNTER (INPATIENT)
Facility: MEDICAL CENTER | Age: 32
LOS: 2 days | DRG: 638 | End: 2017-05-27
Attending: EMERGENCY MEDICINE | Admitting: HOSPITALIST
Payer: MEDICAID

## 2017-05-24 DIAGNOSIS — E10.10 DIABETIC KETOACIDOSIS WITHOUT COMA ASSOCIATED WITH TYPE 1 DIABETES MELLITUS (HCC): ICD-10-CM

## 2017-05-24 LAB
APTT PPP: 24.2 SEC (ref 24.7–36)
BASOPHILS # BLD AUTO: 0.4 % (ref 0–1.8)
BASOPHILS # BLD: 0.09 K/UL (ref 0–0.12)
EKG IMPRESSION: NORMAL
EOSINOPHIL # BLD AUTO: 0.01 K/UL (ref 0–0.51)
EOSINOPHIL NFR BLD: 0 % (ref 0–6.9)
ERYTHROCYTE [DISTWIDTH] IN BLOOD BY AUTOMATED COUNT: 40.9 FL (ref 35.9–50)
GLUCOSE BLD-MCNC: 450 MG/DL (ref 65–99)
HCT VFR BLD AUTO: 50.3 % (ref 42–52)
HGB BLD-MCNC: 16.7 G/DL (ref 14–18)
IMM GRANULOCYTES # BLD AUTO: 0.26 K/UL (ref 0–0.11)
IMM GRANULOCYTES NFR BLD AUTO: 1.3 % (ref 0–0.9)
INR PPP: 0.99 (ref 0.87–1.13)
LYMPHOCYTES # BLD AUTO: 0.7 K/UL (ref 1–4.8)
LYMPHOCYTES NFR BLD: 3.4 % (ref 22–41)
MCH RBC QN AUTO: 30.1 PG (ref 27–33)
MCHC RBC AUTO-ENTMCNC: 33.2 G/DL (ref 33.7–35.3)
MCV RBC AUTO: 90.6 FL (ref 81.4–97.8)
MONOCYTES # BLD AUTO: 1.43 K/UL (ref 0–0.85)
MONOCYTES NFR BLD AUTO: 7 % (ref 0–13.4)
NEUTROPHILS # BLD AUTO: 18.06 K/UL (ref 1.82–7.42)
NEUTROPHILS NFR BLD: 87.9 % (ref 44–72)
NRBC # BLD AUTO: 0 K/UL
NRBC BLD AUTO-RTO: 0 /100 WBC
PLATELET # BLD AUTO: 349 K/UL (ref 164–446)
PMV BLD AUTO: 10.6 FL (ref 9–12.9)
PROTHROMBIN TIME: 13.4 SEC (ref 12–14.6)
RBC # BLD AUTO: 5.55 M/UL (ref 4.7–6.1)
WBC # BLD AUTO: 20.6 K/UL (ref 4.8–10.8)

## 2017-05-24 PROCEDURE — 80053 COMPREHEN METABOLIC PANEL: CPT

## 2017-05-24 PROCEDURE — 96375 TX/PRO/DX INJ NEW DRUG ADDON: CPT

## 2017-05-24 PROCEDURE — 96361 HYDRATE IV INFUSION ADD-ON: CPT

## 2017-05-24 PROCEDURE — 85730 THROMBOPLASTIN TIME PARTIAL: CPT

## 2017-05-24 PROCEDURE — 700105 HCHG RX REV CODE 258: Performed by: EMERGENCY MEDICINE

## 2017-05-24 PROCEDURE — 85025 COMPLETE CBC W/AUTO DIFF WBC: CPT

## 2017-05-24 PROCEDURE — 82962 GLUCOSE BLOOD TEST: CPT

## 2017-05-24 PROCEDURE — 93005 ELECTROCARDIOGRAM TRACING: CPT

## 2017-05-24 PROCEDURE — 700111 HCHG RX REV CODE 636 W/ 250 OVERRIDE (IP): Performed by: EMERGENCY MEDICINE

## 2017-05-24 PROCEDURE — 02HV33Z INSERTION OF INFUSION DEVICE INTO SUPERIOR VENA CAVA, PERCUTANEOUS APPROACH: ICD-10-PCS | Performed by: EMERGENCY MEDICINE

## 2017-05-24 PROCEDURE — 99291 CRITICAL CARE FIRST HOUR: CPT

## 2017-05-24 PROCEDURE — 83690 ASSAY OF LIPASE: CPT

## 2017-05-24 PROCEDURE — 85610 PROTHROMBIN TIME: CPT

## 2017-05-24 RX ORDER — ONDANSETRON 2 MG/ML
4 INJECTION INTRAMUSCULAR; INTRAVENOUS ONCE
Status: COMPLETED | OUTPATIENT
Start: 2017-05-24 | End: 2017-05-24

## 2017-05-24 RX ORDER — SODIUM CHLORIDE 9 MG/ML
2000 INJECTION, SOLUTION INTRAVENOUS ONCE
Status: COMPLETED | OUTPATIENT
Start: 2017-05-24 | End: 2017-05-25

## 2017-05-24 RX ORDER — LORAZEPAM 2 MG/ML
1 INJECTION INTRAMUSCULAR ONCE
Status: COMPLETED | OUTPATIENT
Start: 2017-05-24 | End: 2017-05-24

## 2017-05-24 RX ADMIN — LORAZEPAM 1 MG: 2 INJECTION INTRAMUSCULAR; INTRAVENOUS at 23:10

## 2017-05-24 RX ADMIN — SODIUM CHLORIDE 2000 ML: 9 INJECTION, SOLUTION INTRAVENOUS at 23:10

## 2017-05-24 RX ADMIN — FAMOTIDINE 20 MG: 10 INJECTION INTRAVENOUS at 23:10

## 2017-05-24 RX ADMIN — ONDANSETRON 4 MG: 2 INJECTION INTRAMUSCULAR; INTRAVENOUS at 23:10

## 2017-05-24 ASSESSMENT — PAIN SCALES - GENERAL: PAINLEVEL_OUTOF10: 5

## 2017-05-24 ASSESSMENT — LIFESTYLE VARIABLES: DO YOU DRINK ALCOHOL: NO

## 2017-05-24 NOTE — IP AVS SNAPSHOT
" <p align=\"LEFT\"><IMG SRC=\"//EMRWB/blob$/[email or web address]\" alt=\"Image\" WIDTH=\"50%\" HEIGHT=\"200\" BORDER=\"\"></p>                   Name:Paras Lamb  Medical Record Number:[id number]  CSN: [id number]    YOB: 1985   Age: 31 y.o.  Sex: male  HT:1.778 m (5' 10\") WT: 66.8 kg (147 lb 4.3 oz)          Admit Date: 5/24/2017     Discharge Date:   Today's Date: 5/27/2017  Attending Doctor:  Joseph Anglin D.O.                  Allergies:  Review of patient's allergies indicates no known allergies.          Follow-up Information     1. Follow up with Aaron Woodward PA-C. Go on 6/7/2017.    Specialty:  Family Medicine    Why:  Please arrive at 3pm for your appointment. Please bring photo ID, insurance card, and list of medications.    Contact information    [address] [place] [address]  [phone]           Medication List      Take these Medications        Instructions    gabapentin 300 MG Caps   Commonly known as:  NEURONTIN   Notes to Patient:  This helps with diabetic nerve pain.     Take 600 mg by mouth 3 times a day.   Dose:  600 mg       hydrOXYzine 25 MG Caps   Commonly known as:  VISTARIL   Notes to Patient:  This is to help with sleep or to relieve itching.     Take 1 Cap by mouth 2 times a day as needed for Itching. sleep   Dose:  25 mg       insulin 70/30 (70-30) 100 UNIT/ML Susp   What changed:  how much to take   Commonly known as:  HUMULIN,NOVOLIN   Notes to Patient:  Do not forget to check your blood sugar before giving yourself any insulin.     Inject 23 Units as instructed 2 Times a Day.   Dose:  23 Units       metoclopramide 10 MG Tabs   Commonly known as:  REGLAN   Notes to Patient:  This is an anti-nausea medication.     Take 1 Tab by mouth 3 times a day before meals.   Dose:  10 mg       omeprazole 20 MG delayed-release capsule   Commonly known as:  PRILOSEC   Notes to Patient:  This is to help with stomach acid build-up.    Take 1 Cap by mouth 2 Times a Day.   Dose:  20 " mg       ondansetron 4 MG Tbdp   Commonly known as:  ZOFRAN ODT   Notes to Patient:  This is to help when you feel nauseated, and/or vomiting.     Take 1 Tab by mouth every four hours as needed for Nausea/Vomiting.   Dose:  4 mg       potassium chloride SA 20 MEQ Tbcr   Commonly known as:  Kdur   Notes to Patient:  This is to help with your low blood potassium level.     Take 1 Tab by mouth 2 times a day.   Dose:  20 mEq

## 2017-05-24 NOTE — IP AVS SNAPSHOT
Metranome Access Code: [email or web address]  Expires: 5/28/2017  2:08 PM    Metranome  A secure, online tool to manage your health information     [employer]’s Metranome® is a secure, online tool that connects you to your personalized health information from the privacy of your home -- day or night - making it very easy for you to manage your healthcare. Once the activation process is completed, you can even access your medical information using the Metranome cristina, which is available for free in the Apple Cristina store or Google Play store.     Metranome provides the following levels of access (as shown below):   My Chart Features   [facility] Primary Care Doctor [facility]  Specialists [facility]  Urgent  Care Non-[facility]  Primary Care  Doctor   Email your healthcare team securely and privately 24/7 X X X X   Manage appointments: schedule your next appointment; view details of past/upcoming appointments X      Request prescription refills. X      View recent personal medical records, including lab and immunizations X X X X   View health record, including health history, allergies, medications X X X X   Read reports about your outpatient visits, procedures, consult and ER notes X X X X   See your discharge summary, which is a recap of your hospital and/or ER visit that includes your diagnosis, lab results, and care plan. X X       How to register for Metranome:  1. Go to  [email or web address].  2. Click on the Sign Up Now box, which takes you to the New Member Sign Up page. You will need to provide the following information:  a. Enter your Metranome Access Code exactly as it appears at the top of this page. (You will not need to use this code after you’ve completed the sign-up process. If you do not sign up before the expiration date, you must request a new code.)   b. Enter your date of birth.   c. Enter your home email address.   d. Click Submit, and follow the next screen’s instructions.  3. Create a Metranome ID. This will be your Metranome  login ID and cannot be changed, so think of one that is secure and easy to remember.  4. Create a Spectrum5 password. You can change your password at any time.  5. Enter your Password Reset Question and Answer. This can be used at a later time if you forget your password.   6. Enter your e-mail address. This allows you to receive e-mail notifications when new information is available in Spectrum5.  7. Click Sign Up. You can now view your health information.    For assistance activating your Spectrum5 account, call [phone]

## 2017-05-24 NOTE — IP AVS SNAPSHOT
" Home Care Instructions                                                                                                                  Name:Paras Lamb  Medical Record Number:[id number]  CSN: [id number]    YOB: 1985   Age: 31 y.o.  Sex: male  HT:1.778 m (5' 10\") WT: 66.8 kg (147 lb 4.3 oz)          Admit Date: 5/24/2017     Discharge Date:   Today's Date: 5/27/2017  Attending Doctor:  Joseph Anglin D.O.                  Allergies:  Review of patient's allergies indicates no known allergies.            Discharge Instructions       Discharge Instructions    Discharged to home by car with friend. Discharged via walking, hospital escort: Refused.  Special equipment needed: Not Applicable    Be sure to schedule a follow-up appointment with your primary care doctor or any specialists as instructed.     Discharge Plan:   Diet Plan: Discussed  Activity Level: Discussed  Smoking Cessation Offered: Patient Refused  Confirmed Follow up Appointment: Appointment Scheduled  Confirmed Symptoms Management: Discussed  Medication Reconciliation Updated: Yes  Influenza Vaccine Indication: Not indicated: Previously immunized this influenza season and > 8 years of age    I understand that a diet low in cholesterol, fat, and sodium is recommended for good health. Unless I have been given specific instructions below for another diet, I accept this instruction as my diet prescription.   Other diet: Diabetic, low sugar. Check your blood sugar before meals and bedtime!    Special Instructions: None    · Is patient discharged on Warfarin / Coumadin?   No     · Is patient Post Blood Transfusion?  No    Hyperglycemia  Hyperglycemia occurs when the glucose (sugar) in your blood is too high. Hyperglycemia can happen for many reasons, but it most often happens to people who do not know they have diabetes or are not managing their diabetes properly.   CAUSES   Whether you have diabetes or not, there are other causes of hyperglycemia. " "Hyperglycemia can occur when you have diabetes, but it can also occur in other situations that you might not be as aware of, such as:  Diabetes  · If you have diabetes and are having problems controlling your blood glucose, hyperglycemia could occur because of some of the following reasons:  · Not following your meal plan.  · Not taking your diabetes medications or not taking it properly.  · Exercising less or doing less activity than you normally do.  · Being sick.  Pre-diabetes  · This cannot be ignored. Before people develop Type 2 diabetes, they almost always have \"pre-diabetes.\" This is when your blood glucose levels are higher than normal, but not yet high enough to be diagnosed as diabetes. Research has shown that some long-term damage to the body, especially the heart and circulatory system, may already be occurring during pre-diabetes. If you take action to manage your blood glucose when you have pre-diabetes, you may delay or prevent Type 2 diabetes from developing.  Stress  · If you have diabetes, you may be \"diet\" controlled or on oral medications or insulin to control your diabetes. However, you may find that your blood glucose is higher than usual in the hospital whether you have diabetes or not. This is often referred to as \"stress hyperglycemia.\" Stress can elevate your blood glucose. This happens because of hormones put out by the body during times of stress. If stress has been the cause of your high blood glucose, it can be followed regularly by your caregiver. That way he/she can make sure your hyperglycemia does not continue to get worse or progress to diabetes.  Steroids  · Steroids are medications that act on the infection fighting system (immune system) to block inflammation or infection. One side effect can be a rise in blood glucose. Most people can produce enough extra insulin to allow for this rise, but for those who cannot, steroids make blood glucose levels go even higher. It is not " "unusual for steroid treatments to \"uncover\" diabetes that is developing. It is not always possible to determine if the hyperglycemia will go away after the steroids are stopped. A special blood test called an A1c is sometimes done to determine if your blood glucose was elevated before the steroids were started.  SYMPTOMS  · Thirsty.  · Frequent urination.  · Dry mouth.  · Blurred vision.  · Tired or fatigue.  · Weakness.  · Sleepy.  · Tingling in feet or leg.  DIAGNOSIS   Diagnosis is made by monitoring blood glucose in one or all of the following ways:  · A1c test. This is a chemical found in your blood.  · Fingerstick blood glucose monitoring.  · Laboratory results.  TREATMENT   First, knowing the cause of the hyperglycemia is important before the hyperglycemia can be treated. Treatment may include, but is not be limited to:  · Education.  · Change or adjustment in medications.  · Change or adjustment in meal plan.  · Treatment for an illness, infection, etc.  · More frequent blood glucose monitoring.  · Change in exercise plan.  · Decreasing or stopping steroids.  · Lifestyle changes.  HOME CARE INSTRUCTIONS   · Test your blood glucose as directed.  · Exercise regularly. Your caregiver will give you instructions about exercise. Pre-diabetes or diabetes which comes on with stress is helped by exercising.  · Eat wholesome, balanced meals. Eat often and at regular, fixed times. Your caregiver or nutritionist will give you a meal plan to guide your sugar intake.  · Being at an ideal weight is important. If needed, losing as little as 10 to 15 pounds may help improve blood glucose levels.  SEEK MEDICAL CARE IF:   · You have questions about medicine, activity, or diet.  · You continue to have symptoms (problems such as increased thirst, urination, or weight gain).  SEEK IMMEDIATE MEDICAL CARE IF:   · You are vomiting or have diarrhea.  · Your breath smells fruity.  · You are breathing faster or slower.  · You are very " sleepy or incoherent.  · You have numbness, tingling, or pain in your feet or hands.  · You have chest pain.  · Your symptoms get worse even though you have been following your caregiver's orders.  · If you have any other questions or concerns.     This information is not intended to replace advice given to you by your health care provider. Make sure you discuss any questions you have with your health care provider.     Document Released: 06/13/2002 Document Revised: 03/11/2013 Document Reviewed: 04/15/2013  [employer] Interactive Patient Education ©2016 [employer] Inc.    Diabetic Ketoacidosis  Diabetic ketoacidosis is a life-threatening complication of diabetes. If it is not treated, it can cause severe dehydration and organ damage and can lead to a coma or death.  CAUSES  This condition develops when there is not enough of the hormone insulin in the body. Insulin helps the body to break down sugar for energy. Without insulin, the body cannot break down sugar, so it breaks down fats instead. This leads to the production of acids that are called ketones. Ketones are poisonous at high levels.  This condition can be triggered by:  · Stress on the body that is brought on by an illness.  · Medicines that raise blood glucose levels.  · Not taking diabetes medicine.  SYMPTOMS  Symptoms of this condition include:  · Fatigue.  · Weight loss.  · Excessive thirst.  · Light-headedness.  · Fruity or sweet-smelling breath.  · Excessive urination.  · Vision changes.  · Confusion or irritability.  · Nausea.  · Vomiting.  · Rapid breathing.  · Abdominal pain.  · Feeling flushed.  DIAGNOSIS  This condition is diagnosed based on a medical history, a physical exam, and blood tests. You may also have a urine test that checks for ketones.  TREATMENT  This condition may be treated with:  · Fluid replacement. This may be done to correct dehydration.  · Insulin injections. These may be given through the skin or through an IV  "tube.  · Electrolyte replacement. Electrolytes, such as potassium and sodium, may be given in pill form or through an IV tube.  · Antibiotic medicines. These may be prescribed if your condition was caused by an infection.  HOME CARE INSTRUCTIONS  Eating and Drinking  · Drink enough fluids to keep your urine clear or pale yellow.  · If you cannot eat, alternate between drinking fluids with sugar (such as juice) and salty fluids (such as broth or bouillon).  · If you can eat, follow your usual diet and drink sugar-free liquids, such as water.  Other Instructions  · Take insulin as directed by your health care provider. Do not skip insulin injections. Do not use  insulin.  · If your blood sugar is over 240 mg/dL, monitor your urine ketones every 4-6 hours.  · If you were prescribed an antibiotic medicine, finish all of it even if you start to feel better.  · Rest and exercise only as directed by your health care provider.  · If you get sick, call your health care provider and begin treatment quickly. Your body often needs extra insulin to fight an illness.  · Check your blood glucose levels regularly. If your blood glucose is high, drink plenty of fluids. This helps to flush out ketones.  SEEK MEDICAL CARE IF:  · Your blood glucose level is too high or too low.  · You have ketones in your urine.  · You have a fever.  · You cannot eat.  · You cannot tolerate fluids.  · You have been vomiting for more than 2 hours.  · You continue to have symptoms of this condition.  · You develop new symptoms.  SEEK IMMEDIATE MEDICAL CARE IF:  · Your blood glucose levels continue to be high (elevated).  · Your monitor reads \"high\" even when you are taking insulin.  · You faint.  · You have chest pain.  · You have trouble breathing.  · You have a sudden, severe headache.  · You have sudden weakness in one arm or one leg.  · You have sudden trouble speaking or swallowing.  · You have vomiting or diarrhea that gets worse after 3 " hours.  · You feel severely fatigued.  · You have trouble thinking.  · You have abdominal pain.  · You are severely dehydrated. Symptoms of severe dehydration include:  ¨ Extreme thirst.  ¨ Dry mouth.  ¨ Blue lips.  ¨ Cold hands and feet.  ¨ Rapid breathing.     This information is not intended to replace advice given to you by your health care provider. Make sure you discuss any questions you have with your health care provider.     Document Released: 12/15/2001 Document Revised: 05/03/2016 Document Reviewed: 11/25/2015  [employer] Interactive Patient Education ©2016 Elsevier Inc.        Follow-up Information     1. Follow up with Aaron Woodward PA-C. Go on 6/7/2017.    Specialty:  Family Medicine    Why:  Please arrive at 3pm for your appointment. Please bring photo ID, insurance card, and list of medications.    Contact information    [address] [place] [phone]  [phone]           Discharge Medication Instructions:    Below are the medications your physician expects you to take upon discharge:    Review all your home medications and newly ordered medications with your doctor and/or pharmacist. Follow medication instructions as directed by your doctor and/or pharmacist.    Please keep your medication list with you and share with your physician.               Medication List      START taking these medications        Instructions    Morning Afternoon Evening Bedtime    hydrOXYzine 25 MG Caps   Commonly known as:  VISTARIL   Next Dose Due:  Take tonight, if needed.    Notes to Patient:  This is to help with sleep or to relieve itching.         Take 1 Cap by mouth 2 times a day as needed for Itching. sleep   Dose:  25 mg                        metoclopramide 10 MG Tabs   Commonly known as:  REGLAN   Next Dose Due:  Take tonight before dinner.    Notes to Patient:  This is an anti-nausea medication.         Take 1 Tab by mouth 3 times a day before meals.   Dose:  10 mg                         omeprazole 20 MG delayed-release capsule   Last time this was given:  20 mg on 5/27/2017  9:03 AM   Commonly known as:  PRILOSEC   Next Dose Due:  Take tonight.    Notes to Patient:  This is to help with stomach acid build-up.        Take 1 Cap by mouth 2 Times a Day.   Dose:  20 mg                        ondansetron 4 MG Tbdp   Commonly known as:  ZOFRAN ODT   Next Dose Due:  Take as needed.    Notes to Patient:  This is to help when you feel nauseated, and/or vomiting.         Take 1 Tab by mouth every four hours as needed for Nausea/Vomiting.   Dose:  4 mg                        potassium chloride SA 20 MEQ Tbcr   Last time this was given:  40 mEq on 5/27/2017  9:03 AM   Commonly known as:  Kdur   Next Dose Due:  Take tonight.    Notes to Patient:  This is to help with your low blood potassium level.         Take 1 Tab by mouth 2 times a day.   Dose:  20 mEq                          CHANGE how you take these medications        Instructions    Morning Afternoon Evening Bedtime    insulin 70/30 (70-30) 100 UNIT/ML Susp   What changed:  how much to take   Commonly known as:  HUMULIN,NOVOLIN   Next Dose Due:  Take tonight.    Notes to Patient:  Do not forget to check your blood sugar before giving yourself any insulin.         Inject 23 Units as instructed 2 Times a Day.   Dose:  23 Units                          CONTINUE taking these medications        Instructions    Morning Afternoon Evening Bedtime    gabapentin 300 MG Caps   Last time this was given:  300 mg on 5/27/2017  9:03 AM   Commonly known as:  NEURONTIN   Next Dose Due:  Take tonight.    Notes to Patient:  This helps with diabetic nerve pain.         Take 600 mg by mouth 3 times a day.   Dose:  600 mg                          STOP taking these medications     insulin regular 100 Unit/mL Soln   Commonly known as:  HUMULIN R   Notes to Patient:  We are giving you another type of insulin, do not double up!                    Where to Get Your Medications       These medications were sent to [facility] [place] [place] - [address] [facility] [place]  [address] [facility] [place] [address]     Phone:  [phone]    - metoclopramide 10 MG Tabs      Information about where to get these medications is not yet available     ! Ask your nurse or doctor about these medications    - hydrOXYzine 25 MG Caps  - insulin 70/30 (70-30) 100 UNIT/ML Susp  - omeprazole 20 MG delayed-release capsule  - ondansetron 4 MG Tbdp  - potassium chloride SA 20 MEQ Tbcr            Instructions           Diet / Nutrition:    Follow any diet instructions given to you by your doctor or the dietician, including how much salt (sodium) you are allowed each day.    If you are overweight, talk to your doctor about a weight reduction plan.    Activity:    Remain physically active following your doctor's instructions about exercise and activity.    Rest often.     Any time you become even a little tired or short of breath, SIT DOWN and rest.    Worsening Symptoms:    Report any of the following signs and symptoms to the doctor's office immediately:    *Pain of jaw, arm, or neck  *Chest pain not relieved by medication                               *Dizziness or loss of consciousness  *Difficulty breathing even when at rest   *More tired than usual                                       *Bleeding drainage or swelling of surgical site  *Swelling of feet, ankles, legs or stomach                 *Fever (>100ºF)  *Pink or blood tinged sputum  *Weight gain (3lbs/day or 5lbs /week)           *Shock from internal defibrillator (if applicable)  *Palpitations or irregular heartbeats                *Cool and/or numb extremities    Stroke Awareness    Common Risk Factors for Stroke include:    Age  Atrial Fibrillation  Carotid Artery Stenosis  Diabetes Mellitus  Excessive alcohol consumption  High blood pressure  Overweight   Physical inactivity  Smoking    Warning signs and symptoms of a stroke include:    *Sudden numbness  or weakness of the face, arm or leg (especially on one side of the body).  *Sudden confusion, trouble speaking or understanding.  *Sudden trouble seeing in one or both eyes.  *Sudden trouble walking, dizziness, loss of balance or coordination.Sudden severe headache with no known cause.    It is very important to get treatment quickly when a stroke occurs. If you experience any of the above warning signs, call 911 immediately.                   Disclaimer         Quit Smoking / Tobacco Use:    I understand the use of any tobacco products increases my chance of suffering from future heart disease or stroke and could cause other illnesses which may shorten my life. Quitting the use of tobacco products is the single most important thing I can do to improve my health. For further information on smoking / tobacco cessation call a Toll Free Quit Line at 1-677.852.6686 (*[employer] [place]) or 1-458.556.1496 (American [employer] Association) or you can access the web based program at www.lungBrandtology.org.    [place] Tobacco Users Help Line:  (821) 391-4466       Toll Free: 1-836.331.7816  Quit Tobacco Program [facility] (239)804-0121    Crisis Hotline:    [place] Crisis Hotline:  4-316-MEFSQII or 1-810.625.3898    [place] Crisis Hotline:    1-811.818.8759 or 186-410-3644    Discharge Survey:   Thank you for choosing [facility]. We hope we did everything we could to make your hospital stay a pleasant one. You may be receiving a phone survey and we would appreciate your time and participation in answering the questions. Your input is very valuable to us in our efforts to improve our service to our patients and their families.        My signature on this form indicates that:    1. I have reviewed and understand the above information.  2. My questions regarding this information have been answered to my satisfaction.  3. I have formulated a plan with my discharge nurse to obtain my prescribed medications for  home.                  Disclaimer         __________________________________                     __________       ________                       Patient Signature                                                 Date                    Time

## 2017-05-24 NOTE — IP AVS SNAPSHOT
5/27/2017    Paras Lamb  [address]  [facility] [address]    Dear Paras:    [facility] wants to ensure your discharge home is safe and you or your loved ones have had all of your questions answered regarding your care after you leave the hospital.    Below is a list of resources and contact information should you have any questions regarding your hospital stay, follow-up instructions, or active medical symptoms.    Questions or Concerns Regarding… Contact   Medical Questions Related to Your Discharge  (7 days a week, 8am-5pm) Contact a Nurse Care Coordinator   [phone]   Medical Questions Not Related to Your Discharge  (24 hours a day / 7 days a week)  Contact the Nurse Health Line   [phone]    Medications or Discharge Instructions Refer to your discharge packet   or contact your [facility] Primary Care Provider   [phone]   Follow-up Appointment(s) Schedule your appointment via [employer]   or contact Scheduling [phone]   Billing Review your statement via [employer]  or contact Billing [phone]   Medical Records Review your records via [employer]   or contact Medical Records [phone]     You may receive a telephone call within two days of discharge. This call is to make certain you understand your discharge instructions and have the opportunity to have any questions answered. You can also easily access your medical information, test results and upcoming appointments via the [employer] free online health management tool. You can learn more and sign up at [employer]. For assistance setting up your [employer] account, please call [phone].    Once again, we want to ensure your discharge home is safe and that you have a clear understanding of any next steps in your care. If you have any questions or concerns, please do not hesitate to contact us, we are here for you. Thank you for choosing [facility] for your healthcare needs.    Sincerely,    Your [facility] Healthcare Team

## 2017-05-25 ENCOUNTER — RESOLUTE PROFESSIONAL BILLING HOSPITAL PROF FEE (OUTPATIENT)
Dept: HOSPITALIST | Facility: MEDICAL CENTER | Age: 32
End: 2017-05-25
Payer: MEDICAID

## 2017-05-25 ENCOUNTER — APPOINTMENT (OUTPATIENT)
Dept: RADIOLOGY | Facility: MEDICAL CENTER | Age: 32
DRG: 638 | End: 2017-05-25
Attending: EMERGENCY MEDICINE
Payer: MEDICAID

## 2017-05-25 PROBLEM — K92.0 COFFEE GROUND EMESIS: Status: ACTIVE | Noted: 2017-05-25

## 2017-05-25 LAB
ALBUMIN SERPL BCP-MCNC: 5.3 G/DL (ref 3.2–4.9)
ALBUMIN/GLOB SERPL: 1.3 G/DL
ALP SERPL-CCNC: 116 U/L (ref 30–99)
ALT SERPL-CCNC: 12 U/L (ref 2–50)
ANION GAP SERPL CALC-SCNC: 10 MMOL/L (ref 0–11.9)
ANION GAP SERPL CALC-SCNC: 16 MMOL/L (ref 0–11.9)
ANION GAP SERPL CALC-SCNC: 29 MMOL/L (ref 0–11.9)
ANION GAP SERPL CALC-SCNC: 36 MMOL/L (ref 0–11.9)
ANION GAP SERPL CALC-SCNC: 5 MMOL/L (ref 0–11.9)
APPEARANCE UR: CLEAR
AST SERPL-CCNC: 28 U/L (ref 12–45)
BILIRUB SERPL-MCNC: 0.7 MG/DL (ref 0.1–1.5)
BILIRUB UR QL STRIP.AUTO: NEGATIVE
BUN SERPL-MCNC: 21 MG/DL (ref 8–22)
BUN SERPL-MCNC: 23 MG/DL (ref 8–22)
BUN SERPL-MCNC: 24 MG/DL (ref 8–22)
BUN SERPL-MCNC: 32 MG/DL (ref 8–22)
BUN SERPL-MCNC: 35 MG/DL (ref 8–22)
CALCIUM SERPL-MCNC: 10.3 MG/DL (ref 8.5–10.5)
CALCIUM SERPL-MCNC: 8.8 MG/DL (ref 8.5–10.5)
CALCIUM SERPL-MCNC: 8.9 MG/DL (ref 8.5–10.5)
CALCIUM SERPL-MCNC: 9.1 MG/DL (ref 8.5–10.5)
CALCIUM SERPL-MCNC: 9.4 MG/DL (ref 8.5–10.5)
CHLORIDE SERPL-SCNC: 101 MMOL/L (ref 96–112)
CHLORIDE SERPL-SCNC: 115 MMOL/L (ref 96–112)
CHLORIDE SERPL-SCNC: 116 MMOL/L (ref 96–112)
CHLORIDE SERPL-SCNC: 117 MMOL/L (ref 96–112)
CHLORIDE SERPL-SCNC: 88 MMOL/L (ref 96–112)
CO2 SERPL-SCNC: 10 MMOL/L (ref 20–33)
CO2 SERPL-SCNC: 12 MMOL/L (ref 20–33)
CO2 SERPL-SCNC: 18 MMOL/L (ref 20–33)
CO2 SERPL-SCNC: 22 MMOL/L (ref 20–33)
CO2 SERPL-SCNC: 9 MMOL/L (ref 20–33)
COLOR UR: COLORLESS
CREAT SERPL-MCNC: 0.88 MG/DL (ref 0.5–1.4)
CREAT SERPL-MCNC: 0.93 MG/DL (ref 0.5–1.4)
CREAT SERPL-MCNC: 1.1 MG/DL (ref 0.5–1.4)
CREAT SERPL-MCNC: 1.28 MG/DL (ref 0.5–1.4)
CREAT SERPL-MCNC: 1.51 MG/DL (ref 0.5–1.4)
ERYTHROCYTE [DISTWIDTH] IN BLOOD BY AUTOMATED COUNT: 41.4 FL (ref 35.9–50)
EST. AVERAGE GLUCOSE BLD GHB EST-MCNC: 220 MG/DL
GFR SERPL CREATININE-BSD FRML MDRD: 54 ML/MIN/1.73 M 2
GFR SERPL CREATININE-BSD FRML MDRD: >60 ML/MIN/1.73 M 2
GLOBULIN SER CALC-MCNC: 4.1 G/DL (ref 1.9–3.5)
GLUCOSE BLD-MCNC: 129 MG/DL (ref 65–99)
GLUCOSE BLD-MCNC: 129 MG/DL (ref 65–99)
GLUCOSE BLD-MCNC: 135 MG/DL (ref 65–99)
GLUCOSE BLD-MCNC: 139 MG/DL (ref 65–99)
GLUCOSE BLD-MCNC: 141 MG/DL (ref 65–99)
GLUCOSE BLD-MCNC: 149 MG/DL (ref 65–99)
GLUCOSE BLD-MCNC: 149 MG/DL (ref 65–99)
GLUCOSE BLD-MCNC: 163 MG/DL (ref 65–99)
GLUCOSE BLD-MCNC: 165 MG/DL (ref 65–99)
GLUCOSE BLD-MCNC: 189 MG/DL (ref 65–99)
GLUCOSE BLD-MCNC: 204 MG/DL (ref 65–99)
GLUCOSE BLD-MCNC: 205 MG/DL (ref 65–99)
GLUCOSE BLD-MCNC: 264 MG/DL (ref 65–99)
GLUCOSE BLD-MCNC: 299 MG/DL (ref 65–99)
GLUCOSE BLD-MCNC: 311 MG/DL (ref 65–99)
GLUCOSE BLD-MCNC: 403 MG/DL (ref 65–99)
GLUCOSE BLD-MCNC: 408 MG/DL (ref 65–99)
GLUCOSE BLD-MCNC: 91 MG/DL (ref 65–99)
GLUCOSE SERPL-MCNC: 160 MG/DL (ref 65–99)
GLUCOSE SERPL-MCNC: 175 MG/DL (ref 65–99)
GLUCOSE SERPL-MCNC: 245 MG/DL (ref 65–99)
GLUCOSE SERPL-MCNC: 479 MG/DL (ref 65–99)
GLUCOSE SERPL-MCNC: 512 MG/DL (ref 65–99)
GLUCOSE UR STRIP.AUTO-MCNC: >1000 MG/DL
HBA1C MFR BLD: 9.3 % (ref 0–5.6)
HCT VFR BLD AUTO: 43.1 % (ref 42–52)
HGB BLD-MCNC: 14.2 G/DL (ref 14–18)
KETONES UR STRIP.AUTO-MCNC: >150 MG/DL
LEUKOCYTE ESTERASE UR QL STRIP.AUTO: NEGATIVE
LIPASE SERPL-CCNC: 60 U/L (ref 11–82)
MAGNESIUM SERPL-MCNC: 2.3 MG/DL (ref 1.5–2.5)
MCH RBC QN AUTO: 29.6 PG (ref 27–33)
MCHC RBC AUTO-ENTMCNC: 32.9 G/DL (ref 33.7–35.3)
MCV RBC AUTO: 90 FL (ref 81.4–97.8)
MICRO URNS: ABNORMAL
NITRITE UR QL STRIP.AUTO: NEGATIVE
PH UR STRIP.AUTO: 5 [PH]
PHOSPHATE SERPL-MCNC: 5.5 MG/DL (ref 2.5–4.5)
PLATELET # BLD AUTO: 308 K/UL (ref 164–446)
PMV BLD AUTO: 10 FL (ref 9–12.9)
POTASSIUM SERPL-SCNC: 3.6 MMOL/L (ref 3.6–5.5)
POTASSIUM SERPL-SCNC: 3.6 MMOL/L (ref 3.6–5.5)
POTASSIUM SERPL-SCNC: 4.5 MMOL/L (ref 3.6–5.5)
POTASSIUM SERPL-SCNC: 5 MMOL/L (ref 3.6–5.5)
POTASSIUM SERPL-SCNC: 5.1 MMOL/L (ref 3.6–5.5)
PROT SERPL-MCNC: 9.4 G/DL (ref 6–8.2)
PROT UR QL STRIP: NEGATIVE MG/DL
RBC # BLD AUTO: 4.79 M/UL (ref 4.7–6.1)
RBC UR QL AUTO: NEGATIVE
SODIUM SERPL-SCNC: 133 MMOL/L (ref 135–145)
SODIUM SERPL-SCNC: 140 MMOL/L (ref 135–145)
SODIUM SERPL-SCNC: 143 MMOL/L (ref 135–145)
SODIUM SERPL-SCNC: 143 MMOL/L (ref 135–145)
SODIUM SERPL-SCNC: 145 MMOL/L (ref 135–145)
SP GR UR STRIP.AUTO: 1.02
WBC # BLD AUTO: 10.8 K/UL (ref 4.8–10.8)

## 2017-05-25 PROCEDURE — 700105 HCHG RX REV CODE 258: Performed by: HOSPITALIST

## 2017-05-25 PROCEDURE — 700111 HCHG RX REV CODE 636 W/ 250 OVERRIDE (IP): Performed by: HOSPITALIST

## 2017-05-25 PROCEDURE — 96375 TX/PRO/DX INJ NEW DRUG ADDON: CPT

## 2017-05-25 PROCEDURE — A9270 NON-COVERED ITEM OR SERVICE: HCPCS | Performed by: HOSPITALIST

## 2017-05-25 PROCEDURE — 770001 HCHG ROOM/CARE - MED/SURG/GYN PRIV*

## 2017-05-25 PROCEDURE — 36415 COLL VENOUS BLD VENIPUNCTURE: CPT

## 2017-05-25 PROCEDURE — C1751 CATH, INF, PER/CENT/MIDLINE: HCPCS

## 2017-05-25 PROCEDURE — 700105 HCHG RX REV CODE 258

## 2017-05-25 PROCEDURE — 81003 URINALYSIS AUTO W/O SCOPE: CPT

## 2017-05-25 PROCEDURE — 82962 GLUCOSE BLOOD TEST: CPT

## 2017-05-25 PROCEDURE — 700111 HCHG RX REV CODE 636 W/ 250 OVERRIDE (IP): Performed by: PHARMACIST

## 2017-05-25 PROCEDURE — 700111 HCHG RX REV CODE 636 W/ 250 OVERRIDE (IP): Performed by: EMERGENCY MEDICINE

## 2017-05-25 PROCEDURE — 85027 COMPLETE CBC AUTOMATED: CPT

## 2017-05-25 PROCEDURE — 83735 ASSAY OF MAGNESIUM: CPT

## 2017-05-25 PROCEDURE — 99223 1ST HOSP IP/OBS HIGH 75: CPT | Mod: 25 | Performed by: HOSPITALIST

## 2017-05-25 PROCEDURE — 700102 HCHG RX REV CODE 250 W/ 637 OVERRIDE(OP): Performed by: HOSPITALIST

## 2017-05-25 PROCEDURE — 84100 ASSAY OF PHOSPHORUS: CPT

## 2017-05-25 PROCEDURE — 80048 BASIC METABOLIC PNL TOTAL CA: CPT | Mod: 91

## 2017-05-25 PROCEDURE — 700102 HCHG RX REV CODE 250 W/ 637 OVERRIDE(OP)

## 2017-05-25 PROCEDURE — 96365 THER/PROPH/DIAG IV INF INIT: CPT

## 2017-05-25 PROCEDURE — 71010 DX-CHEST-PORTABLE (1 VIEW): CPT

## 2017-05-25 PROCEDURE — 99291 CRITICAL CARE FIRST HOUR: CPT | Performed by: HOSPITALIST

## 2017-05-25 PROCEDURE — 83036 HEMOGLOBIN GLYCOSYLATED A1C: CPT

## 2017-05-25 PROCEDURE — 36556 INSERT NON-TUNNEL CV CATH: CPT

## 2017-05-25 RX ORDER — LORAZEPAM 2 MG/ML
0.5 INJECTION INTRAMUSCULAR EVERY 6 HOURS PRN
Status: DISCONTINUED | OUTPATIENT
Start: 2017-05-25 | End: 2017-05-27 | Stop reason: HOSPADM

## 2017-05-25 RX ORDER — DEXTROSE MONOHYDRATE 25 G/50ML
25 INJECTION, SOLUTION INTRAVENOUS
Status: DISCONTINUED | OUTPATIENT
Start: 2017-05-25 | End: 2017-05-25

## 2017-05-25 RX ORDER — MAGNESIUM SULFATE HEPTAHYDRATE 40 MG/ML
2 INJECTION, SOLUTION INTRAVENOUS
Status: DISCONTINUED | OUTPATIENT
Start: 2017-05-25 | End: 2017-05-25

## 2017-05-25 RX ORDER — LORAZEPAM 0.5 MG/1
0.5 TABLET ORAL EVERY 6 HOURS PRN
Status: DISCONTINUED | OUTPATIENT
Start: 2017-05-25 | End: 2017-05-27 | Stop reason: HOSPADM

## 2017-05-25 RX ORDER — SODIUM CHLORIDE 9 MG/ML
INJECTION, SOLUTION INTRAVENOUS CONTINUOUS
Status: DISCONTINUED | OUTPATIENT
Start: 2017-05-25 | End: 2017-05-25

## 2017-05-25 RX ORDER — ENALAPRILAT 1.25 MG/ML
1.25 INJECTION INTRAVENOUS EVERY 6 HOURS PRN
Status: DISCONTINUED | OUTPATIENT
Start: 2017-05-25 | End: 2017-05-27 | Stop reason: HOSPADM

## 2017-05-25 RX ORDER — DEXTROSE MONOHYDRATE 25 G/50ML
25 INJECTION, SOLUTION INTRAVENOUS
Status: DISCONTINUED | OUTPATIENT
Start: 2017-05-25 | End: 2017-05-27 | Stop reason: HOSPADM

## 2017-05-25 RX ORDER — HYDROXYZINE PAMOATE 25 MG/1
25 CAPSULE ORAL
Status: ON HOLD | COMMUNITY
End: 2017-05-27

## 2017-05-25 RX ORDER — OMEPRAZOLE 20 MG/1
20 CAPSULE, DELAYED RELEASE ORAL 2 TIMES DAILY
Status: DISCONTINUED | OUTPATIENT
Start: 2017-05-25 | End: 2017-05-27 | Stop reason: HOSPADM

## 2017-05-25 RX ORDER — ONDANSETRON 2 MG/ML
4 INJECTION INTRAMUSCULAR; INTRAVENOUS ONCE
Status: COMPLETED | OUTPATIENT
Start: 2017-05-25 | End: 2017-05-25

## 2017-05-25 RX ORDER — POTASSIUM CHLORIDE 7.45 MG/ML
10 INJECTION INTRAVENOUS ONCE
Status: COMPLETED | OUTPATIENT
Start: 2017-05-25 | End: 2017-05-25

## 2017-05-25 RX ORDER — ONDANSETRON 4 MG/1
4 TABLET, ORALLY DISINTEGRATING ORAL EVERY 4 HOURS PRN
Status: DISCONTINUED | OUTPATIENT
Start: 2017-05-25 | End: 2017-05-27 | Stop reason: HOSPADM

## 2017-05-25 RX ORDER — OXYCODONE HYDROCHLORIDE 5 MG/1
5 TABLET ORAL EVERY 4 HOURS PRN
Status: DISCONTINUED | OUTPATIENT
Start: 2017-05-25 | End: 2017-05-26

## 2017-05-25 RX ORDER — DEXTROSE AND SODIUM CHLORIDE 10; .45 G/100ML; G/100ML
INJECTION, SOLUTION INTRAVENOUS CONTINUOUS
Status: DISCONTINUED | OUTPATIENT
Start: 2017-05-25 | End: 2017-05-25

## 2017-05-25 RX ORDER — DEXTROSE AND SODIUM CHLORIDE 5; .9 G/100ML; G/100ML
INJECTION, SOLUTION INTRAVENOUS CONTINUOUS
Status: DISCONTINUED | OUTPATIENT
Start: 2017-05-25 | End: 2017-05-25

## 2017-05-25 RX ORDER — SODIUM CHLORIDE 9 MG/ML
2000 INJECTION, SOLUTION INTRAVENOUS ONCE
Status: COMPLETED | OUTPATIENT
Start: 2017-05-25 | End: 2017-05-25

## 2017-05-25 RX ORDER — GABAPENTIN 300 MG/1
300 CAPSULE ORAL 3 TIMES DAILY
Status: DISCONTINUED | OUTPATIENT
Start: 2017-05-25 | End: 2017-05-27 | Stop reason: HOSPADM

## 2017-05-25 RX ORDER — POTASSIUM CHLORIDE 14.9 MG/ML
20 INJECTION INTRAVENOUS ONCE
Status: COMPLETED | OUTPATIENT
Start: 2017-05-25 | End: 2017-05-25

## 2017-05-25 RX ORDER — AMOXICILLIN 250 MG
2 CAPSULE ORAL 2 TIMES DAILY
Status: DISCONTINUED | OUTPATIENT
Start: 2017-05-25 | End: 2017-05-27 | Stop reason: HOSPADM

## 2017-05-25 RX ORDER — GABAPENTIN 300 MG/1
600 CAPSULE ORAL 3 TIMES DAILY
COMMUNITY
End: 2018-10-14

## 2017-05-25 RX ORDER — OXYCODONE HYDROCHLORIDE 10 MG/1
10 TABLET ORAL EVERY 4 HOURS PRN
Status: DISCONTINUED | OUTPATIENT
Start: 2017-05-25 | End: 2017-05-26

## 2017-05-25 RX ORDER — LORAZEPAM 2 MG/ML
1 INJECTION INTRAMUSCULAR ONCE
Status: COMPLETED | OUTPATIENT
Start: 2017-05-25 | End: 2017-05-25

## 2017-05-25 RX ORDER — POLYETHYLENE GLYCOL 3350 17 G/17G
1 POWDER, FOR SOLUTION ORAL
Status: DISCONTINUED | OUTPATIENT
Start: 2017-05-25 | End: 2017-05-27 | Stop reason: HOSPADM

## 2017-05-25 RX ORDER — BISACODYL 10 MG
10 SUPPOSITORY, RECTAL RECTAL
Status: DISCONTINUED | OUTPATIENT
Start: 2017-05-25 | End: 2017-05-27 | Stop reason: HOSPADM

## 2017-05-25 RX ORDER — PROMETHAZINE HYDROCHLORIDE 25 MG/1
12.5-25 TABLET ORAL EVERY 4 HOURS PRN
Status: DISCONTINUED | OUTPATIENT
Start: 2017-05-25 | End: 2017-05-27 | Stop reason: HOSPADM

## 2017-05-25 RX ORDER — MAGNESIUM SULFATE HEPTAHYDRATE 40 MG/ML
4 INJECTION, SOLUTION INTRAVENOUS
Status: DISCONTINUED | OUTPATIENT
Start: 2017-05-25 | End: 2017-05-25

## 2017-05-25 RX ORDER — PROMETHAZINE HYDROCHLORIDE 25 MG/1
12.5-25 SUPPOSITORY RECTAL EVERY 4 HOURS PRN
Status: DISCONTINUED | OUTPATIENT
Start: 2017-05-25 | End: 2017-05-27 | Stop reason: HOSPADM

## 2017-05-25 RX ORDER — ONDANSETRON 2 MG/ML
4 INJECTION INTRAMUSCULAR; INTRAVENOUS EVERY 4 HOURS PRN
Status: DISCONTINUED | OUTPATIENT
Start: 2017-05-25 | End: 2017-05-27 | Stop reason: HOSPADM

## 2017-05-25 RX ORDER — PANTOPRAZOLE SODIUM 40 MG/10ML
40 INJECTION, POWDER, LYOPHILIZED, FOR SOLUTION INTRAVENOUS 2 TIMES DAILY
Status: DISCONTINUED | OUTPATIENT
Start: 2017-05-25 | End: 2017-05-25

## 2017-05-25 RX ORDER — DEXTROSE AND SODIUM CHLORIDE 10; .45 G/100ML; G/100ML
INJECTION, SOLUTION INTRAVENOUS CONTINUOUS
Status: ACTIVE | OUTPATIENT
Start: 2017-05-25 | End: 2017-05-25

## 2017-05-25 RX ORDER — SODIUM CHLORIDE 450 MG/100ML
INJECTION, SOLUTION INTRAVENOUS CONTINUOUS
Status: DISCONTINUED | OUTPATIENT
Start: 2017-05-25 | End: 2017-05-26

## 2017-05-25 RX ADMIN — GABAPENTIN 300 MG: 300 CAPSULE ORAL at 14:44

## 2017-05-25 RX ADMIN — ONDANSETRON 4 MG: 2 INJECTION INTRAMUSCULAR; INTRAVENOUS at 09:42

## 2017-05-25 RX ADMIN — LORAZEPAM 0.5 MG: 1 TABLET ORAL at 20:54

## 2017-05-25 RX ADMIN — DEXTROSE AND SODIUM CHLORIDE: 10; .45 INJECTION, SOLUTION INTRAVENOUS at 10:00

## 2017-05-25 RX ADMIN — SODIUM CHLORIDE: 4.5 INJECTION, SOLUTION INTRAVENOUS at 18:00

## 2017-05-25 RX ADMIN — ONDANSETRON 4 MG: 2 INJECTION INTRAMUSCULAR; INTRAVENOUS at 01:09

## 2017-05-25 RX ADMIN — POTASSIUM CHLORIDE 20 MEQ: 14.9 INJECTION, SOLUTION INTRAVENOUS at 12:40

## 2017-05-25 RX ADMIN — SODIUM CHLORIDE: 9 INJECTION, SOLUTION INTRAVENOUS at 02:43

## 2017-05-25 RX ADMIN — PROCHLORPERAZINE EDISYLATE 10 MG: 5 INJECTION INTRAMUSCULAR; INTRAVENOUS at 03:09

## 2017-05-25 RX ADMIN — SODIUM CHLORIDE 2000 ML: 9 INJECTION, SOLUTION INTRAVENOUS at 02:42

## 2017-05-25 RX ADMIN — POTASSIUM CHLORIDE 10 MEQ: 7.46 INJECTION, SOLUTION INTRAVENOUS at 03:55

## 2017-05-25 RX ADMIN — OMEPRAZOLE 20 MG: 20 CAPSULE, DELAYED RELEASE ORAL at 20:38

## 2017-05-25 RX ADMIN — SODIUM CHLORIDE 9 UNITS/HR: 9 INJECTION, SOLUTION INTRAVENOUS at 13:08

## 2017-05-25 RX ADMIN — LORAZEPAM 1 MG: 2 INJECTION INTRAMUSCULAR; INTRAVENOUS at 01:09

## 2017-05-25 RX ADMIN — INSULIN HUMAN 15 UNITS: 100 INJECTION, SUSPENSION SUBCUTANEOUS at 16:09

## 2017-05-25 RX ADMIN — OXYCODONE HYDROCHLORIDE 5 MG: 5 TABLET ORAL at 23:16

## 2017-05-25 RX ADMIN — DEXTROSE AND SODIUM CHLORIDE: 5; 900 INJECTION, SOLUTION INTRAVENOUS at 06:45

## 2017-05-25 RX ADMIN — OXYCODONE HYDROCHLORIDE 5 MG: 5 TABLET ORAL at 09:38

## 2017-05-25 RX ADMIN — STANDARDIZED SENNA CONCENTRATE AND DOCUSATE SODIUM 2 TABLET: 8.6; 5 TABLET, FILM COATED ORAL at 07:41

## 2017-05-25 RX ADMIN — OMEPRAZOLE 20 MG: 20 CAPSULE, DELAYED RELEASE ORAL at 07:42

## 2017-05-25 RX ADMIN — POTASSIUM CHLORIDE 20 MEQ: 14.9 INJECTION, SOLUTION INTRAVENOUS at 15:45

## 2017-05-25 RX ADMIN — SODIUM CHLORIDE 3.5 UNITS/HR: 9 INJECTION, SOLUTION INTRAVENOUS at 02:30

## 2017-05-25 RX ADMIN — ENOXAPARIN SODIUM 40 MG: 100 INJECTION SUBCUTANEOUS at 07:41

## 2017-05-25 RX ADMIN — GABAPENTIN 300 MG: 300 CAPSULE ORAL at 20:38

## 2017-05-25 RX ADMIN — GABAPENTIN 300 MG: 300 CAPSULE ORAL at 07:42

## 2017-05-25 RX ADMIN — STANDARDIZED SENNA CONCENTRATE AND DOCUSATE SODIUM 2 TABLET: 8.6; 5 TABLET, FILM COATED ORAL at 20:38

## 2017-05-25 ASSESSMENT — PAIN SCALES - GENERAL
PAINLEVEL_OUTOF10: 3
PAINLEVEL_OUTOF10: 6
PAINLEVEL_OUTOF10: 0
PAINLEVEL_OUTOF10: 5

## 2017-05-25 ASSESSMENT — ENCOUNTER SYMPTOMS
FEVER: 0
LOSS OF CONSCIOUSNESS: 0
VOMITING: 0
CHILLS: 0
ABDOMINAL PAIN: 0
BACK PAIN: 0
COUGH: 0
HEADACHES: 0
SHORTNESS OF BREATH: 0
NAUSEA: 1
DIARRHEA: 0
DIZZINESS: 0

## 2017-05-25 ASSESSMENT — LIFESTYLE VARIABLES: DO YOU DRINK ALCOHOL: NO

## 2017-05-25 NOTE — PROGRESS NOTES
Paged hospitalist on call Dr. Vargas and informed of FSBS of 205.  New orders received and implemented.

## 2017-05-25 NOTE — CARE PLAN
Problem: Safety  Goal: Will remain free from injury  Outcome: PROGRESSING AS EXPECTED  Bed in low position, locked, and lower bed rails up.  Bed alarm on.  Room near nursing station.     Problem: Venous Thromboembolism (VTW)/Deep Vein Thrombosis (DVT) Prevention:  Goal: Patient will participate in Venous Thrombosis (VTE)/Deep Vein Thrombosis (DVT)Prevention Measures  Outcome: PROGRESSING AS EXPECTED  Pt has SCD's in place.

## 2017-05-25 NOTE — ED NOTES
Jorge from lab called with critical labs of Co2 9 and Glucose 512 and Dr. Jung made aware and will get consent for central line and primary nurse made aware.

## 2017-05-25 NOTE — PROGRESS NOTES
Paged hospitalist on call Dr. Ojeda and informed of fsbs change of 104 from 0230 to 0330.  New orders received and implemented.

## 2017-05-25 NOTE — ASSESSMENT & PLAN NOTE
Distant Hx of PUD  Hgb stable  Start PPI  Follow serial H&H  Consult GI if any evidence of significant further bleeding

## 2017-05-25 NOTE — PROGRESS NOTES
Hospital Medicine Interval Note  Date of Service:  5/25/2017    Chief Complaint  31 y.o.-year-old male admitted 5/24/2017 with N/V, coffee ground emesis and DKA    Interval Problem Update  Pt feeling better this am. Some mild N but is hungry and no V.  No abd pain.  C/o pain in his legs which is normal for him    Consultants/Specialty  none    Disposition  Keep in ICU while on DKA protocol and watching UGIB     Review of Systems   Constitutional: Negative for fever and chills.   Respiratory: Negative for cough and shortness of breath.    Cardiovascular: Negative for chest pain.   Gastrointestinal: Positive for nausea. Negative for vomiting, abdominal pain and diarrhea.   Musculoskeletal: Negative for back pain.        Burning pain in both legs; chronic for pt   Skin: Negative for rash.   Neurological: Negative for dizziness, loss of consciousness and headaches.      Physical Exam Laboratory/Imaging   Filed Vitals:    05/25/17 0325 05/25/17 0400 05/25/17 0500 05/25/17 0600   BP:       Pulse: 136 133 134 138   Temp:  37 °C (98.6 °F)  37.5 °C (99.5 °F)   Resp: 22 24 21 18   Height:       Weight:       SpO2: 95% 97% 96% 98%     Physical Exam   Constitutional: He is oriented to person, place, and time. He appears well-developed and well-nourished. No distress.   HENT:   Head: Normocephalic and atraumatic.   Eyes: Conjunctivae are normal.   Neck: No JVD present.   Cardiovascular: Normal rate.  Exam reveals no gallop.    Murmur heard.  Pulmonary/Chest: Effort normal. No stridor. No respiratory distress. He has no wheezes. He has no rales.   Abdominal: Soft. Bowel sounds are normal. He exhibits no distension. There is no tenderness. There is no rebound and no guarding.   Musculoskeletal: He exhibits no edema.   Neurological: He is oriented to person, place, and time.   Skin: Skin is warm and dry. No rash noted. He is not diaphoretic.   Psychiatric: He has a normal mood and affect. Thought content normal.   Nursing note and  vitals reviewed.   Lab Results   Component Value Date/Time    WBC 20.6* 05/24/2017 10:51 PM    HEMOGLOBIN 16.7 05/24/2017 10:51 PM    HEMATOCRIT 50.3 05/24/2017 10:51 PM    PLATELET COUNT 349 05/24/2017 10:51 PM     Lab Results   Component Value Date/Time    SODIUM 143 05/25/2017 06:00 AM    POTASSIUM 4.5 05/25/2017 06:00 AM    CHLORIDE 115* 05/25/2017 06:00 AM    CO2 12* 05/25/2017 06:00 AM    GLUCOSE 245* 05/25/2017 06:00 AM    BUN 24* 05/25/2017 06:00 AM    CREATININE 1.10 05/25/2017 06:00 AM      Assessment/Plan    DKA (diabetic ketoacidoses) (CMS-HCC) (present on admission)  Assessment & Plan  CO2 and AG still grossly abnormal  Cont serila BMP's  Cont DKA protocol  Allow po water/diet fluids    Coffee ground emesis  Assessment & Plan  Distant Hx of PUD  Hgb stable  Start PPI  Follow serial H&H  Consult GI if any evidence of significant further bleeding     Medications reviewed, Labs reviewed, Radiology images reviewed and EKG reviewed        DVT Prophylaxis: Enoxaparin (Lovenox)  DVT prophylaxis - mechanical: SCDs  Ulcer prophylaxis: Yes      Critical care time 40min's managing DKA, critical electrolyte abnormalities, following serial lab

## 2017-05-25 NOTE — H&P
DATE OF ADMISSION:  05/24/2017.    PRIMARY CARE PHYSICIAN:  Unassigned.    DIAGNOSIS:  Diabetic ketoacidosis.    HISTORY OF PRESENT ILLNESS:  A 31-year-old male.  He previously was admitted   back in April for diabetic ketoacidosis.  He has returned with diagnosis of   DKA again.  Apparently, he is lethargic.  He has a significantly low CO2.  I   am unable to get significant history from him.  All information has been   obtained from the chart as well as Dr. Andrea Jung, emergency room   physician.  Initial chief complaint per Dr. Severino was vomiting up   coffee-ground emesis, nausea, vomiting.  Patient was worked up and found to   have very low CO2, elevated anion gap and elevated blood sugars.    REVIEW OF SYSTEMS:  Unable to obtain secondary to patient's altered mental   status.    PAST MEDICAL HISTORY:  1.  Diabetes mellitus requiring insulin.  2.  History of seizures.  3.  Hypertension.    PAST SURGICAL HISTORY:  Unknown.    MEDICATIONS:  From prior discharge, he is supposed to be on:  1.  Gabapentin 300 mg 3 times a day.  2.  Ambien 5 mg as needed for sleep.  3.  Insulin 70/30, 20 units twice a day.  4.  Insulin aspart per sliding scale.    SOCIAL HISTORY:  Unknown.  Apparently, he is a smoker, occasional alcohol   socially, previously used heroin and meth.    FAMILY HISTORY:  Unknown.  Unable to obtain secondary to patient's   obtundation.    PHYSICAL EXAMINATION:  VITAL SIGNS:  Temperature is 98.6, heart rate of 130, blood pressure is   143/64, oxygen 97% on room air.  GENERAL:  This is a thin male.  He is obtunded.  HEENT:  NCAT, his pupils are equal and round.  Nares are patent.  He has dry   mucosa.  NECK:  Trachea, no stridor on auscultation.  No adenopathy.  LUNGS:  Clear to auscultation bilaterally.  No wheezes, no crackles, no   accessory muscle use.  CARDIOVASCULAR:  He is tachycardic, did not hear any murmurs.  Normal S1, S2.  ABDOMEN:  Thin, soft, nontender.  No peritoneal findings.   Normoactive bowel   sounds.  EXTREMITIES:  He has no lower extremity edema.  Calves are symmetric   bilaterally.  No clubbing or cyanosis of the upper digits, 2+ radial artery   pulses, 2+ bilateral dorsalis pedal pulses.  NEUROLOGIC:  Unable to fully assess.  He is obtunded, but appears to be   grossly intact.    LABORATORY DATA:  CBC shows a white blood cell count of 20.6, hemoglobin 16.7,   hematocrit is 50.3, platelet count 349.  Basic metabolic panel shows a sodium   of 133, potassium 5.1, chloride 88, CO2 of 9, anion gap of 36, glucose is   512, BUN of 35, creatinine 1.51, calcium 10.3.  AST, ALT unremarkable.    Alkaline phosphatase 116, albumin is 5.3, total protein 9.4, lipase is 60.    INR is 0.99.  Urinalysis is unremarkable other than glucose greater than 1000,   elevated ketones.    IMAGING STUDIES:  Chest x-ray shows new right IJ _____overlying the SVC, no   pneumothorax identified.    ASSESSMENT AND PLAN:  1.  Diabetic ketoacidosis, admitted to the ICU, started on insulin drip.  IV   fluid boluses, aggressive fluid hydration.  Once his blood sugars were less   than 250, he was _____ with D5.  As he dropped low at 200, we started him on   D10.  Awaiting for anion gap to close and improvement of CO2 prior to taking   him off his insulin.  Make sure he is awake, alert and able to take orals and   prior to stopping insulin as well.  Monitor neurologic status.  If not   improving neurologically, we will need to have further drug testing.  Per   reports, he has been following lethargically.  2.  Azotemia.  Give IV fluids.  3.  Tobacco use.  Recommend smoking cessation.  Nicotine patch if required.       ____________________________________     DO NATE FINN / TERESA    DD:  05/25/2017 05:13:57  DT:  05/25/2017 06:24:02    D#:  [id number]  Job#:  [id number]

## 2017-05-25 NOTE — ED PROVIDER NOTES
"ED Provider Note    CHIEF COMPLAINT  Chief Complaint   Patient presents with   • Emesis     coffee ground   • N/V     x 3 hours       HPI  Paras Lamb is a 31 y.o. male who presents to the emergency department coffee-ground emesis. The patient's been sick over the last 3 hours. The patient is an insulin-dependent diabetic and he has not administered any insulin nor checked his blood sugar over the last 24 hours. The patient states 3 hours ago he started having vomiting which became persistent with coffee-ground emesis. The patient does have cramping abdominal pain. He denies change in bowel or bladder function. He states he does have a history of bleeding ulcers. The patient did receive Zofran prior to arrival which was not effective. The patient continues have active emesis on my exam. He has not had any recent fevers.    REVIEW OF SYSTEMS  See HPI for further details. All other systems are negative.     PAST MEDICAL HISTORY  Past Medical History   Diagnosis Date   • Diabetes    • Seizure (CMS-HCC)    • Hypertension        SOCIAL HISTORY  Social History     Social History   • Marital Status: Single     Spouse Name: N/A   • Number of Children: N/A   • Years of Education: N/A     Social History Main Topics   • Smoking status: Current Every Day Smoker -- 0.50 packs/day for 17 years     Types: Cigarettes   • Smokeless tobacco: None   • Alcohol Use: Yes      Comment: socially   • Drug Use: No      Comment: heroin, meth   • Sexual Activity: Not Asked     Other Topics Concern   • None     Social History Narrative           PHYSICAL EXAM  VITAL SIGNS: /89 mmHg  Pulse 129  Temp(Src) 36.7 °C (98 °F)  Resp 20  Ht 1.778 m (5' 10\")  Wt 72.576 kg (160 lb)  BMI 22.96 kg/m2  SpO2 94%  Constitutional: in acute distress, Non-toxic appearance.   HENT: Normocephalic, Atraumatic, tympanic membranes are intact and nonerythematous bilaterally, Oropharynx moist without exudates or erythema, Nose normal.   Eyes: PERRLA, " EOMI, Conjunctiva normal.  Neck: Supple without meningismus  Lymphatic: No lymphadenopathy noted.   Cardiovascular: Tachycardic heart rate, Normal rhythm, No murmurs, No rubs, No gallops.   Thorax & Lungs: Normal breath sounds, No respiratory distress, No wheezing, No chest tenderness.   Abdomen: Epigastric discomfort to deep palpation, no rebound, no guarding, normal bowel sounds  Skin: Warm, Dry, No erythema, No rash.   Back: No tenderness, No CVA tenderness.   Extremities: Atraumatic with symmetric distal pulses, No edema, No tenderness, No cyanosis, No clubbing.   Neurologic: Alert & oriented x 3, cranial nerves II through XII are intact, Normal motor function, Normal sensory function, No focal deficits noted.   Psychiatric: Affect normal, Judgment normal, Mood normal.       RADIOLOGY/  DX-CHEST-PORTABLE (1 VIEW)    (Results Pending)     PROCEDURES Central line placement  Central Line Placement Procedure Note  Indication: poor peripheral access    Consent: The patient provided verbal consent for this procedure.    Procedure: The patient was positioned appropriately and the skin over the right internal jugular vein was prepped with betadine and draped in a sterile fashion. Local anesthesia was obtained by infiltration using 1% Lidocaine without epinephrine.  A large bore needle was used to identify the vein.  A guide wire was then inserted into the vein through the needle. A triple lumen catheter was then inserted into the vessel over the guide wire using the Seldinger technique.  All ports showed good, free flowing blood return and were flushed with saline solution.  The catheter was then securely fastened to the skin with sutures and covered with a sterile dressing.  A post procedure X-ray was ordered and is still pending at this time.    The patient tolerated the procedure well.    Complications: None      COURSE & MEDICAL DECISION MAKING  Pertinent Labs & Imaging studies reviewed. (See chart for  details)  This a 31-year-old diabetic who presents to the emergency department with coffee-ground emesis. 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 The blood work does show evidence of diabetic ketoacidosis in the patient's emesis is been very difficult to control. On arrival the patient received Zofran as well as Ativan as he had Zofran prior to my exam. He also received intravenous hydration. The patient also received Pepcid for the gastritis. He continued to have emesis and therefore received another dose of Zofran as well as Ativan. A central line was placed for further resuscitation. He does have a profound acidosis from his uncontrolled diabetes myelitis. He also has evidence of renal insufficiency which is suspect is all prerenal. The patient continues to receive intravenous resuscitation. We will start the patient on insulin drip. He'll be admitted to the hospitalist in guarded condition.    FINAL IMPRESSION  1. Diabetic ketoacidosis   2. Gastritis  3. Central line placement  4. Critical care time 30 minutes despite procedures     Disposition  The patient will be admitted in guarded condition    Electronically signed by: Andrea Jung, 5/24/2017 11:01 PM

## 2017-05-25 NOTE — ED NOTES
Pt to floor with hospital transport.  Pt has zoll.  Patient has chart and all belongings.  Floor notified. Vitals up to date.

## 2017-05-25 NOTE — PROGRESS NOTES
Pharmacy Med Rec Update:    Patient states that he hasn't filled his two insulin prescriptions from the [facility].    He is supposed to be taking:  Basaglar 20 units BID  Humalog insulin sliding scale    He states that he has still been taking Insulin 70/30 - 20 units BID and a Regular insulin sliding scale.  Med rec updated with current prescriptions patient reports taking.       Sharri Bell, NaviD, BCPS

## 2017-05-25 NOTE — ED NOTES
"Paras Lamb    Chief Complaint   Patient presents with   • Emesis     coffee ground   • N/V     x 3 hours       Pt BIBA from home.  Pt c/o nausea and coffee ground emesis x 3 hours.  Pt also epigastric pain describes as sharp  10/10.  Pt emesis is coffee ground and thick in texture.  Pt has Hx of HTN, DM, neuropathy and a previous bleeding stomach ulcer per EMS.  EMS administered zofran prior to arrival. FSBS 383.    Blood Pressure: (!) 164/89 mmHg, Pulse: (!) 129, Respiration: 20, Temperature: 36.7 °C (98 °F), Height: 177.8 cm (5' 10\"), Weight: 72.576 kg (160 lb), BMI (Calculated): 22.96, BSA (Calculated): 1.9, Pulse Oximetry: 94 %, O2 Delivery: None (Room Air)      "

## 2017-05-25 NOTE — ASSESSMENT & PLAN NOTE
CO2 and AG still grossly abnormal  Cont serila BMP's  Cont DKA protocol  Allow po water/diet fluids

## 2017-05-26 ENCOUNTER — PATIENT OUTREACH (OUTPATIENT)
Dept: HEALTH INFORMATION MANAGEMENT | Facility: OTHER | Age: 32
End: 2017-05-26

## 2017-05-26 LAB
ANION GAP SERPL CALC-SCNC: 11 MMOL/L (ref 0–11.9)
BUN SERPL-MCNC: 16 MG/DL (ref 8–22)
CALCIUM SERPL-MCNC: 9 MG/DL (ref 8.5–10.5)
CHLORIDE SERPL-SCNC: 108 MMOL/L (ref 96–112)
CO2 SERPL-SCNC: 20 MMOL/L (ref 20–33)
CREAT SERPL-MCNC: 0.65 MG/DL (ref 0.5–1.4)
ERYTHROCYTE [DISTWIDTH] IN BLOOD BY AUTOMATED COUNT: 38.5 FL (ref 35.9–50)
ERYTHROCYTE [DISTWIDTH] IN BLOOD BY AUTOMATED COUNT: 40.9 FL (ref 35.9–50)
GFR SERPL CREATININE-BSD FRML MDRD: >60 ML/MIN/1.73 M 2
GLUCOSE BLD-MCNC: 194 MG/DL (ref 65–99)
GLUCOSE BLD-MCNC: 209 MG/DL (ref 65–99)
GLUCOSE BLD-MCNC: 213 MG/DL (ref 65–99)
GLUCOSE BLD-MCNC: 215 MG/DL (ref 65–99)
GLUCOSE SERPL-MCNC: 197 MG/DL (ref 65–99)
HCT VFR BLD AUTO: 36.1 % (ref 42–52)
HCT VFR BLD AUTO: 38.9 % (ref 42–52)
HGB BLD-MCNC: 12.5 G/DL (ref 14–18)
HGB BLD-MCNC: 12.9 G/DL (ref 14–18)
MCH RBC QN AUTO: 29.3 PG (ref 27–33)
MCH RBC QN AUTO: 29.7 PG (ref 27–33)
MCHC RBC AUTO-ENTMCNC: 33.2 G/DL (ref 33.7–35.3)
MCHC RBC AUTO-ENTMCNC: 34.6 G/DL (ref 33.7–35.3)
MCV RBC AUTO: 85.7 FL (ref 81.4–97.8)
MCV RBC AUTO: 88.4 FL (ref 81.4–97.8)
PLATELET # BLD AUTO: 212 K/UL (ref 164–446)
PLATELET # BLD AUTO: 234 K/UL (ref 164–446)
PMV BLD AUTO: 10 FL (ref 9–12.9)
PMV BLD AUTO: 9.4 FL (ref 9–12.9)
POTASSIUM SERPL-SCNC: 3.3 MMOL/L (ref 3.6–5.5)
RBC # BLD AUTO: 4.21 M/UL (ref 4.7–6.1)
RBC # BLD AUTO: 4.4 M/UL (ref 4.7–6.1)
SODIUM SERPL-SCNC: 139 MMOL/L (ref 135–145)
WBC # BLD AUTO: 5.9 K/UL (ref 4.8–10.8)
WBC # BLD AUTO: 6.6 K/UL (ref 4.8–10.8)

## 2017-05-26 PROCEDURE — C9113 INJ PANTOPRAZOLE SODIUM, VIA: HCPCS | Performed by: INTERNAL MEDICINE

## 2017-05-26 PROCEDURE — A9270 NON-COVERED ITEM OR SERVICE: HCPCS | Performed by: HOSPITALIST

## 2017-05-26 PROCEDURE — A9270 NON-COVERED ITEM OR SERVICE: HCPCS | Performed by: INTERNAL MEDICINE

## 2017-05-26 PROCEDURE — 770006 HCHG ROOM/CARE - MED/SURG/GYN SEMI*

## 2017-05-26 PROCEDURE — 700111 HCHG RX REV CODE 636 W/ 250 OVERRIDE (IP): Performed by: INTERNAL MEDICINE

## 2017-05-26 PROCEDURE — 80048 BASIC METABOLIC PNL TOTAL CA: CPT

## 2017-05-26 PROCEDURE — 36415 COLL VENOUS BLD VENIPUNCTURE: CPT

## 2017-05-26 PROCEDURE — 700102 HCHG RX REV CODE 250 W/ 637 OVERRIDE(OP): Performed by: HOSPITALIST

## 2017-05-26 PROCEDURE — 700102 HCHG RX REV CODE 250 W/ 637 OVERRIDE(OP): Performed by: INTERNAL MEDICINE

## 2017-05-26 PROCEDURE — 99232 SBSQ HOSP IP/OBS MODERATE 35: CPT | Performed by: INTERNAL MEDICINE

## 2017-05-26 PROCEDURE — 700111 HCHG RX REV CODE 636 W/ 250 OVERRIDE (IP): Performed by: HOSPITALIST

## 2017-05-26 PROCEDURE — 85027 COMPLETE CBC AUTOMATED: CPT | Mod: 91

## 2017-05-26 PROCEDURE — 82962 GLUCOSE BLOOD TEST: CPT | Mod: 91

## 2017-05-26 PROCEDURE — 700105 HCHG RX REV CODE 258: Performed by: HOSPITALIST

## 2017-05-26 RX ORDER — METOCLOPRAMIDE HYDROCHLORIDE 5 MG/ML
10 INJECTION INTRAMUSCULAR; INTRAVENOUS
Status: DISCONTINUED | OUTPATIENT
Start: 2017-05-26 | End: 2017-05-27 | Stop reason: HOSPADM

## 2017-05-26 RX ORDER — POTASSIUM CHLORIDE 20 MEQ/1
20 TABLET, EXTENDED RELEASE ORAL 2 TIMES DAILY
Status: DISCONTINUED | OUTPATIENT
Start: 2017-05-26 | End: 2017-05-27

## 2017-05-26 RX ORDER — TRAMADOL HYDROCHLORIDE 50 MG/1
50 TABLET ORAL EVERY 6 HOURS PRN
Status: DISCONTINUED | OUTPATIENT
Start: 2017-05-26 | End: 2017-05-27 | Stop reason: HOSPADM

## 2017-05-26 RX ORDER — PANTOPRAZOLE SODIUM 40 MG/10ML
40 INJECTION, POWDER, LYOPHILIZED, FOR SOLUTION INTRAVENOUS ONCE
Status: COMPLETED | OUTPATIENT
Start: 2017-05-26 | End: 2017-05-26

## 2017-05-26 RX ADMIN — SODIUM CHLORIDE: 4.5 INJECTION, SOLUTION INTRAVENOUS at 06:47

## 2017-05-26 RX ADMIN — TRAMADOL HYDROCHLORIDE 50 MG: 50 TABLET, COATED ORAL at 18:14

## 2017-05-26 RX ADMIN — PROCHLORPERAZINE EDISYLATE 10 MG: 5 INJECTION INTRAMUSCULAR; INTRAVENOUS at 08:30

## 2017-05-26 RX ADMIN — INSULIN HUMAN 15 UNITS: 100 INJECTION, SUSPENSION SUBCUTANEOUS at 17:38

## 2017-05-26 RX ADMIN — OMEPRAZOLE 20 MG: 20 CAPSULE, DELAYED RELEASE ORAL at 21:01

## 2017-05-26 RX ADMIN — LIDOCAINE HYDROCHLORIDE 15 ML: 20 SOLUTION OROPHARYNGEAL at 16:11

## 2017-05-26 RX ADMIN — ONDANSETRON 4 MG: 2 INJECTION INTRAMUSCULAR; INTRAVENOUS at 06:19

## 2017-05-26 RX ADMIN — INSULIN HUMAN 15 UNITS: 100 INJECTION, SUSPENSION SUBCUTANEOUS at 08:34

## 2017-05-26 RX ADMIN — INSULIN LISPRO 2 UNITS: 100 INJECTION, SOLUTION INTRAVENOUS; SUBCUTANEOUS at 09:26

## 2017-05-26 RX ADMIN — LORAZEPAM 0.5 MG: 1 TABLET ORAL at 21:20

## 2017-05-26 RX ADMIN — INSULIN LISPRO 2 UNITS: 100 INJECTION, SOLUTION INTRAVENOUS; SUBCUTANEOUS at 11:52

## 2017-05-26 RX ADMIN — INSULIN LISPRO 3 UNITS: 100 INJECTION, SOLUTION INTRAVENOUS; SUBCUTANEOUS at 17:39

## 2017-05-26 RX ADMIN — PANTOPRAZOLE SODIUM 40 MG: 40 INJECTION, POWDER, FOR SOLUTION INTRAVENOUS at 18:06

## 2017-05-26 RX ADMIN — SODIUM CHLORIDE: 4.5 INJECTION, SOLUTION INTRAVENOUS at 19:37

## 2017-05-26 RX ADMIN — GABAPENTIN 300 MG: 300 CAPSULE ORAL at 20:59

## 2017-05-26 RX ADMIN — OMEPRAZOLE 20 MG: 20 CAPSULE, DELAYED RELEASE ORAL at 13:45

## 2017-05-26 RX ADMIN — METOCLOPRAMIDE 10 MG: 5 INJECTION, SOLUTION INTRAMUSCULAR; INTRAVENOUS at 21:00

## 2017-05-26 RX ADMIN — GABAPENTIN 300 MG: 300 CAPSULE ORAL at 13:45

## 2017-05-26 RX ADMIN — INSULIN LISPRO 3 UNITS: 100 INJECTION, SOLUTION INTRAVENOUS; SUBCUTANEOUS at 21:24

## 2017-05-26 RX ADMIN — OXYCODONE HYDROCHLORIDE 5 MG: 5 TABLET ORAL at 06:26

## 2017-05-26 RX ADMIN — METOCLOPRAMIDE 10 MG: 5 INJECTION, SOLUTION INTRAMUSCULAR; INTRAVENOUS at 16:11

## 2017-05-26 RX ADMIN — POTASSIUM CHLORIDE 20 MEQ: 1500 TABLET, EXTENDED RELEASE ORAL at 20:59

## 2017-05-26 RX ADMIN — STANDARDIZED SENNA CONCENTRATE AND DOCUSATE SODIUM 2 TABLET: 8.6; 5 TABLET, FILM COATED ORAL at 21:01

## 2017-05-26 ASSESSMENT — PAIN SCALES - GENERAL
PAINLEVEL_OUTOF10: 2
PAINLEVEL_OUTOF10: 0
PAINLEVEL_OUTOF10: 7
PAINLEVEL_OUTOF10: 0
PAINLEVEL_OUTOF10: 6

## 2017-05-26 NOTE — CARE PLAN
Problem: Infection  Goal: Will remain free from infection  Intervention: Assess signs and symptoms of infection  Education on infection prevention

## 2017-05-26 NOTE — CARE PLAN
Problem: Infection  Goal: Will remain free from infection  Intervention: Implement standard precautions and perform hand washing before and after patient contact  Washing hands before and after interactions

## 2017-05-26 NOTE — CARE PLAN
Problem: Bowel/Gastric:  Goal: Will not experience complications related to bowel motility  Outcome: PROGRESSING SLOWER THAN EXPECTED  Patient c/o severe epigastric pain- hx of PUD. Nausea/vomiting. Anti-emetics given with good effect.     Problem: Pain Management  Goal: Pain level will decrease to patient’s comfort goal  Outcome: PROGRESSING SLOWER THAN EXPECTED  Epigastric pain 10/10- GI cocktail and IV PPI ordered. Some relief.

## 2017-05-26 NOTE — CARE PLAN
Problem: Knowledge Deficit  Goal: Knowledge of disease process/condition, treatment plan, diagnostic tests, and medications will improve  Outcome: PROGRESSING AS EXPECTED  Pt informed of DKA disease process.  Verbalized understanding.    Problem: Pain Management  Goal: Pain level will decrease to patient’s comfort goal  Outcome: PROGRESSING AS EXPECTED  Assess pain q4h.  Rest, repositioned, and pain medication prn.

## 2017-05-27 VITALS
HEIGHT: 70 IN | DIASTOLIC BLOOD PRESSURE: 84 MMHG | OXYGEN SATURATION: 92 % | WEIGHT: 147.27 LBS | SYSTOLIC BLOOD PRESSURE: 138 MMHG | TEMPERATURE: 98.7 F | HEART RATE: 91 BPM | BODY MASS INDEX: 21.08 KG/M2 | RESPIRATION RATE: 20 BRPM

## 2017-05-27 PROBLEM — K92.0 COFFEE GROUND EMESIS: Status: RESOLVED | Noted: 2017-05-25 | Resolved: 2017-05-27

## 2017-05-27 PROBLEM — K29.70 GASTRITIS: Status: ACTIVE | Noted: 2017-05-27

## 2017-05-27 PROBLEM — E11.10 DKA (DIABETIC KETOACIDOSES): Status: RESOLVED | Noted: 2017-04-26 | Resolved: 2017-05-27

## 2017-05-27 LAB
ANION GAP SERPL CALC-SCNC: 12 MMOL/L (ref 0–11.9)
BUN SERPL-MCNC: 12 MG/DL (ref 8–22)
CALCIUM SERPL-MCNC: 8.8 MG/DL (ref 8.5–10.5)
CHLORIDE SERPL-SCNC: 101 MMOL/L (ref 96–112)
CO2 SERPL-SCNC: 26 MMOL/L (ref 20–33)
CREAT SERPL-MCNC: 0.62 MG/DL (ref 0.5–1.4)
ERYTHROCYTE [DISTWIDTH] IN BLOOD BY AUTOMATED COUNT: 38.9 FL (ref 35.9–50)
GFR SERPL CREATININE-BSD FRML MDRD: >60 ML/MIN/1.73 M 2
GLUCOSE BLD-MCNC: 164 MG/DL (ref 65–99)
GLUCOSE BLD-MCNC: 222 MG/DL (ref 65–99)
GLUCOSE SERPL-MCNC: 188 MG/DL (ref 65–99)
HCT VFR BLD AUTO: 41 % (ref 42–52)
HGB BLD-MCNC: 13.6 G/DL (ref 14–18)
MAGNESIUM SERPL-MCNC: 1.9 MG/DL (ref 1.5–2.5)
MCH RBC QN AUTO: 29.8 PG (ref 27–33)
MCHC RBC AUTO-ENTMCNC: 33.2 G/DL (ref 33.7–35.3)
MCV RBC AUTO: 89.7 FL (ref 81.4–97.8)
PLATELET # BLD AUTO: 160 K/UL (ref 164–446)
PMV BLD AUTO: 11.6 FL (ref 9–12.9)
POTASSIUM SERPL-SCNC: 3 MMOL/L (ref 3.6–5.5)
RBC # BLD AUTO: 4.57 M/UL (ref 4.7–6.1)
SODIUM SERPL-SCNC: 139 MMOL/L (ref 135–145)
WBC # BLD AUTO: 5.8 K/UL (ref 4.8–10.8)

## 2017-05-27 PROCEDURE — 700111 HCHG RX REV CODE 636 W/ 250 OVERRIDE (IP): Performed by: INTERNAL MEDICINE

## 2017-05-27 PROCEDURE — 80048 BASIC METABOLIC PNL TOTAL CA: CPT

## 2017-05-27 PROCEDURE — A9270 NON-COVERED ITEM OR SERVICE: HCPCS | Performed by: HOSPITALIST

## 2017-05-27 PROCEDURE — 83735 ASSAY OF MAGNESIUM: CPT

## 2017-05-27 PROCEDURE — A9270 NON-COVERED ITEM OR SERVICE: HCPCS | Performed by: INTERNAL MEDICINE

## 2017-05-27 PROCEDURE — 700102 HCHG RX REV CODE 250 W/ 637 OVERRIDE(OP): Performed by: HOSPITALIST

## 2017-05-27 PROCEDURE — 99239 HOSP IP/OBS DSCHRG MGMT >30: CPT | Performed by: INTERNAL MEDICINE

## 2017-05-27 PROCEDURE — 36415 COLL VENOUS BLD VENIPUNCTURE: CPT

## 2017-05-27 PROCEDURE — 700102 HCHG RX REV CODE 250 W/ 637 OVERRIDE(OP): Performed by: INTERNAL MEDICINE

## 2017-05-27 PROCEDURE — 85027 COMPLETE CBC AUTOMATED: CPT

## 2017-05-27 PROCEDURE — 82962 GLUCOSE BLOOD TEST: CPT | Mod: 91

## 2017-05-27 RX ORDER — HYDROXYZINE PAMOATE 25 MG/1
25 CAPSULE ORAL 2 TIMES DAILY PRN
Qty: 20 CAP | Refills: 0 | Status: SHIPPED | OUTPATIENT
Start: 2017-05-27 | End: 2018-10-14

## 2017-05-27 RX ORDER — HYDROXYZINE PAMOATE 25 MG/1
25 CAPSULE ORAL
Qty: 30 CAP | Refills: 0 | Status: SHIPPED | OUTPATIENT
Start: 2017-05-27 | End: 2017-05-27

## 2017-05-27 RX ORDER — ONDANSETRON 4 MG/1
4 TABLET, ORALLY DISINTEGRATING ORAL EVERY 4 HOURS PRN
Qty: 20 TAB | Refills: 0 | Status: SHIPPED | OUTPATIENT
Start: 2017-05-27 | End: 2018-10-14

## 2017-05-27 RX ORDER — METOCLOPRAMIDE 10 MG/1
10 TABLET ORAL
Qty: 15 TAB | Refills: 0 | Status: SHIPPED | OUTPATIENT
Start: 2017-05-27 | End: 2018-10-14

## 2017-05-27 RX ORDER — POTASSIUM CHLORIDE 20 MEQ/1
20 TABLET, EXTENDED RELEASE ORAL 2 TIMES DAILY
Qty: 10 TAB | Refills: 0 | Status: SHIPPED | OUTPATIENT
Start: 2017-05-27 | End: 2018-10-14

## 2017-05-27 RX ORDER — POTASSIUM CHLORIDE 20 MEQ/1
40 TABLET, EXTENDED RELEASE ORAL 2 TIMES DAILY
Status: DISCONTINUED | OUTPATIENT
Start: 2017-05-27 | End: 2017-05-27 | Stop reason: HOSPADM

## 2017-05-27 RX ORDER — OMEPRAZOLE 20 MG/1
20 CAPSULE, DELAYED RELEASE ORAL 2 TIMES DAILY
Qty: 60 CAP | Refills: 0 | Status: SHIPPED | OUTPATIENT
Start: 2017-05-27 | End: 2018-10-14

## 2017-05-27 RX ADMIN — INSULIN LISPRO 2 UNITS: 100 INJECTION, SOLUTION INTRAVENOUS; SUBCUTANEOUS at 06:04

## 2017-05-27 RX ADMIN — TRAMADOL HYDROCHLORIDE 50 MG: 50 TABLET, COATED ORAL at 05:31

## 2017-05-27 RX ADMIN — LIDOCAINE HYDROCHLORIDE 15 ML: 20 SOLUTION OROPHARYNGEAL at 13:19

## 2017-05-27 RX ADMIN — POTASSIUM CHLORIDE 40 MEQ: 1500 TABLET, EXTENDED RELEASE ORAL at 09:03

## 2017-05-27 RX ADMIN — METOCLOPRAMIDE 10 MG: 5 INJECTION, SOLUTION INTRAMUSCULAR; INTRAVENOUS at 05:33

## 2017-05-27 RX ADMIN — GABAPENTIN 300 MG: 300 CAPSULE ORAL at 09:03

## 2017-05-27 RX ADMIN — OMEPRAZOLE 20 MG: 20 CAPSULE, DELAYED RELEASE ORAL at 09:03

## 2017-05-27 RX ADMIN — GABAPENTIN 300 MG: 300 CAPSULE ORAL at 13:19

## 2017-05-27 RX ADMIN — INSULIN LISPRO 3 UNITS: 100 INJECTION, SOLUTION INTRAVENOUS; SUBCUTANEOUS at 11:46

## 2017-05-27 RX ADMIN — STANDARDIZED SENNA CONCENTRATE AND DOCUSATE SODIUM 2 TABLET: 8.6; 5 TABLET, FILM COATED ORAL at 09:03

## 2017-05-27 ASSESSMENT — LIFESTYLE VARIABLES
DO YOU DRINK ALCOHOL: YES
HAVE PEOPLE ANNOYED YOU BY CRITICIZING YOUR DRINKING: NO
TOTAL SCORE: 0
EVER FELT BAD OR GUILTY ABOUT YOUR DRINKING: NO
HAVE YOU EVER FELT YOU SHOULD CUT DOWN ON YOUR DRINKING: NO
AVERAGE NUMBER OF DAYS PER WEEK YOU HAVE A DRINK CONTAINING ALCOHOL: 1
EVER HAD A DRINK FIRST THING IN THE MORNING TO STEADY YOUR NERVES TO GET RID OF A HANGOVER: NO
HOW MANY TIMES IN THE PAST YEAR HAVE YOU HAD 5 OR MORE DRINKS IN A DAY: 0
CONSUMPTION TOTAL: NEGATIVE
ON A TYPICAL DAY WHEN YOU DRINK ALCOHOL HOW MANY DRINKS DO YOU HAVE: 2
EVER_SMOKED: YES

## 2017-05-27 ASSESSMENT — PAIN SCALES - GENERAL: PAINLEVEL_OUTOF10: 0

## 2017-05-27 NOTE — PROGRESS NOTES
Patient discharged home.   Gave patient discharge prescriptions and instructed to fill at nearby pharmacy.   Patient states he has enough insulin syringes and a glucometer.  Discharge instructions given, follow-up appointment scheduled at the clinic.   Declined transport.

## 2017-05-27 NOTE — PROGRESS NOTES
Assumed care of pt 1900, he is oriented x4 lethargic but responds appropriately. Pt denies pain or nausea at this time, he is independent with bathroom privileges has call light within reach and bed is in lowest position. Will continue to monitor. Carina Miller RN

## 2017-05-27 NOTE — DISCHARGE INSTRUCTIONS
Discharge Instructions    Discharged to home by car with friend. Discharged via walking, hospital escort: Refused.  Special equipment needed: Not Applicable    Be sure to schedule a follow-up appointment with your primary care doctor or any specialists as instructed.     Discharge Plan:   Diet Plan: Discussed  Activity Level: Discussed  Smoking Cessation Offered: Patient Refused  Confirmed Follow up Appointment: Appointment Scheduled  Confirmed Symptoms Management: Discussed  Medication Reconciliation Updated: Yes  Influenza Vaccine Indication: Not indicated: Previously immunized this influenza season and > 8 years of age    I understand that a diet low in cholesterol, fat, and sodium is recommended for good health. Unless I have been given specific instructions below for another diet, I accept this instruction as my diet prescription.   Other diet: Diabetic, low sugar. Check your blood sugar before meals and bedtime!    Special Instructions: None    · Is patient discharged on Warfarin / Coumadin?   No     · Is patient Post Blood Transfusion?  No    Hyperglycemia  Hyperglycemia occurs when the glucose (sugar) in your blood is too high. Hyperglycemia can happen for many reasons, but it most often happens to people who do not know they have diabetes or are not managing their diabetes properly.   CAUSES   Whether you have diabetes or not, there are other causes of hyperglycemia. Hyperglycemia can occur when you have diabetes, but it can also occur in other situations that you might not be as aware of, such as:  Diabetes  · If you have diabetes and are having problems controlling your blood glucose, hyperglycemia could occur because of some of the following reasons:  · Not following your meal plan.  · Not taking your diabetes medications or not taking it properly.  · Exercising less or doing less activity than you normally do.  · Being sick.  Pre-diabetes  · This cannot be ignored. Before people develop Type 2 diabetes,  "they almost always have \"pre-diabetes.\" This is when your blood glucose levels are higher than normal, but not yet high enough to be diagnosed as diabetes. Research has shown that some long-term damage to the body, especially the heart and circulatory system, may already be occurring during pre-diabetes. If you take action to manage your blood glucose when you have pre-diabetes, you may delay or prevent Type 2 diabetes from developing.  Stress  · If you have diabetes, you may be \"diet\" controlled or on oral medications or insulin to control your diabetes. However, you may find that your blood glucose is higher than usual in the hospital whether you have diabetes or not. This is often referred to as \"stress hyperglycemia.\" Stress can elevate your blood glucose. This happens because of hormones put out by the body during times of stress. If stress has been the cause of your high blood glucose, it can be followed regularly by your caregiver. That way he/she can make sure your hyperglycemia does not continue to get worse or progress to diabetes.  Steroids  · Steroids are medications that act on the infection fighting system (immune system) to block inflammation or infection. One side effect can be a rise in blood glucose. Most people can produce enough extra insulin to allow for this rise, but for those who cannot, steroids make blood glucose levels go even higher. It is not unusual for steroid treatments to \"uncover\" diabetes that is developing. It is not always possible to determine if the hyperglycemia will go away after the steroids are stopped. A special blood test called an A1c is sometimes done to determine if your blood glucose was elevated before the steroids were started.  SYMPTOMS  · Thirsty.  · Frequent urination.  · Dry mouth.  · Blurred vision.  · Tired or fatigue.  · Weakness.  · Sleepy.  · Tingling in feet or leg.  DIAGNOSIS   Diagnosis is made by monitoring blood glucose in one or all of the following " ways:  · A1c test. This is a chemical found in your blood.  · Fingerstick blood glucose monitoring.  · Laboratory results.  TREATMENT   First, knowing the cause of the hyperglycemia is important before the hyperglycemia can be treated. Treatment may include, but is not be limited to:  · Education.  · Change or adjustment in medications.  · Change or adjustment in meal plan.  · Treatment for an illness, infection, etc.  · More frequent blood glucose monitoring.  · Change in exercise plan.  · Decreasing or stopping steroids.  · Lifestyle changes.  HOME CARE INSTRUCTIONS   · Test your blood glucose as directed.  · Exercise regularly. Your caregiver will give you instructions about exercise. Pre-diabetes or diabetes which comes on with stress is helped by exercising.  · Eat wholesome, balanced meals. Eat often and at regular, fixed times. Your caregiver or nutritionist will give you a meal plan to guide your sugar intake.  · Being at an ideal weight is important. If needed, losing as little as 10 to 15 pounds may help improve blood glucose levels.  SEEK MEDICAL CARE IF:   · You have questions about medicine, activity, or diet.  · You continue to have symptoms (problems such as increased thirst, urination, or weight gain).  SEEK IMMEDIATE MEDICAL CARE IF:   · You are vomiting or have diarrhea.  · Your breath smells fruity.  · You are breathing faster or slower.  · You are very sleepy or incoherent.  · You have numbness, tingling, or pain in your feet or hands.  · You have chest pain.  · Your symptoms get worse even though you have been following your caregiver's orders.  · If you have any other questions or concerns.     This information is not intended to replace advice given to you by your health care provider. Make sure you discuss any questions you have with your health care provider.     Document Released: 06/13/2002 Document Revised: 03/11/2013 Document Reviewed: 04/15/2013  [employer] Patient Education  ©2016 [employer]    Diabetic Ketoacidosis  Diabetic ketoacidosis is a life-threatening complication of diabetes. If it is not treated, it can cause severe dehydration and organ damage and can lead to a coma or death.  CAUSES  This condition develops when there is not enough of the hormone insulin in the body. Insulin helps the body to break down sugar for energy. Without insulin, the body cannot break down sugar, so it breaks down fats instead. This leads to the production of acids that are called ketones. Ketones are poisonous at high levels.  This condition can be triggered by:  · Stress on the body that is brought on by an illness.  · Medicines that raise blood glucose levels.  · Not taking diabetes medicine.  SYMPTOMS  Symptoms of this condition include:  · Fatigue.  · Weight loss.  · Excessive thirst.  · Light-headedness.  · Fruity or sweet-smelling breath.  · Excessive urination.  · Vision changes.  · Confusion or irritability.  · Nausea.  · Vomiting.  · Rapid breathing.  · Abdominal pain.  · Feeling flushed.  DIAGNOSIS  This condition is diagnosed based on a medical history, a physical exam, and blood tests. You may also have a urine test that checks for ketones.  TREATMENT  This condition may be treated with:  · Fluid replacement. This may be done to correct dehydration.  · Insulin injections. These may be given through the skin or through an IV tube.  · Electrolyte replacement. Electrolytes, such as potassium and sodium, may be given in pill form or through an IV tube.  · Antibiotic medicines. These may be prescribed if your condition was caused by an infection.  HOME CARE INSTRUCTIONS  Eating and Drinking  · Drink enough fluids to keep your urine clear or pale yellow.  · If you cannot eat, alternate between drinking fluids with sugar (such as juice) and salty fluids (such as broth or bouillon).  · If you can eat, follow your usual diet and drink sugar-free liquids, such as water.  Other  "Instructions  · Take insulin as directed by your health care provider. Do not skip insulin injections. Do not use  insulin.  · If your blood sugar is over 240 mg/dL, monitor your urine ketones every 4-6 hours.  · If you were prescribed an antibiotic medicine, finish all of it even if you start to feel better.  · Rest and exercise only as directed by your health care provider.  · If you get sick, call your health care provider and begin treatment quickly. Your body often needs extra insulin to fight an illness.  · Check your blood glucose levels regularly. If your blood glucose is high, drink plenty of fluids. This helps to flush out ketones.  SEEK MEDICAL CARE IF:  · Your blood glucose level is too high or too low.  · You have ketones in your urine.  · You have a fever.  · You cannot eat.  · You cannot tolerate fluids.  · You have been vomiting for more than 2 hours.  · You continue to have symptoms of this condition.  · You develop new symptoms.  SEEK IMMEDIATE MEDICAL CARE IF:  · Your blood glucose levels continue to be high (elevated).  · Your monitor reads \"high\" even when you are taking insulin.  · You faint.  · You have chest pain.  · You have trouble breathing.  · You have a sudden, severe headache.  · You have sudden weakness in one arm or one leg.  · You have sudden trouble speaking or swallowing.  · You have vomiting or diarrhea that gets worse after 3 hours.  · You feel severely fatigued.  · You have trouble thinking.  · You have abdominal pain.  · You are severely dehydrated. Symptoms of severe dehydration include:  ¨ Extreme thirst.  ¨ Dry mouth.  ¨ Blue lips.  ¨ Cold hands and feet.  ¨ Rapid breathing.     This information is not intended to replace advice given to you by your health care provider. Make sure you discuss any questions you have with your health care provider.     Document Released: 12/15/2001 Document Revised: 2016 Document Reviewed: 2015  [employer] Patient " Education ©2016 [employer]

## 2017-05-27 NOTE — CARE PLAN
Problem: Pain Management  Goal: Pain level will decrease to patient’s comfort goal  Outcome: PROGRESSING AS EXPECTED  Pt denies pain at beginning of shift    Problem: Fluid Volume:  Goal: Will maintain balanced intake and output  Outcome: PROGRESSING AS EXPECTED  Pt with good UOP, fluids stopped this shift per MAR

## 2017-05-27 NOTE — PROGRESS NOTES
Hospital Medicine Progress Note, Adult, Complex               Author: Joseph Anglin Date & Time created: 5/26/2017  7:43 PM     Interval History:   31 y.o male admitted 5/24/2017 with N/V, coffee ground emesis and DKA    5/26/17: seen and examined the patient this morning.  The patient appears lethargic after oxycodone and compazine was given.  The patient would barely open his eyes to answer questions.    Review of Systems:  Review of Systems   Unable to perform ROS      Physical Exam:  Physical Exam   Constitutional: No distress.   HENT:   Head: Normocephalic and atraumatic.   Eyes: Right eye exhibits no discharge. Left eye exhibits no discharge.   Neck: Neck supple. No JVD present.   Cardiovascular: Normal rate and regular rhythm.    Pulmonary/Chest: Effort normal and breath sounds normal. No respiratory distress.   Abdominal: Soft. Bowel sounds are normal. He exhibits no distension.   Musculoskeletal: He exhibits no edema or tenderness.   Neurological:   Very drowsy but able to answer a few questions.   Skin: Skin is warm and dry. He is not diaphoretic.       Labs:        Invalid input(s): [id number]      Recent Labs      05/25/17   0220   05/25/17   1030  05/25/17   1440  05/26/17   0433   SODIUM  140   < >  145  143  139   POTASSIUM  5.0   < >  3.6  3.6  3.3*   CHLORIDE  101   < >  117*  116*  108   CO2  10*   < >  18*  22  20   BUN  32*   < >  23*  21  16   CREATININE  1.28   < >  0.88  0.93  0.65   MAGNESIUM  2.3   --    --    --    --    PHOSPHORUS  5.5*   --    --    --    --    CALCIUM  9.4   < >  8.9  9.1  9.0    < > = values in this interval not displayed.     Recent Labs      05/24/17   2251   05/25/17   1030  05/25/17   1440  05/26/17   0433   ALTSGPT  12   --    --    --    --    ASTSGOT  28   --    --    --    --    ALKPHOSPHAT  116*   --    --    --    --    TBILIRUBIN  0.7   --    --    --    --    LIPASE  60   --    --    --    --    GLUCOSE  512*   < >  160*  175*  197*    < > = values in this  interval not displayed.     Recent Labs      17   1055   RBC  5.55  4.79  4.40*   HEMOGLOBIN  16.7  14.2  12.9*   HEMATOCRIT  50.3  43.1  38.9*   PLATELETCT  349  308  234   PROTHROMBTM  13.4   --    --    APTT  24.2*   --    --    INR  0.99   --    --      Recent Labs      17   1055   WBC  20.6*  10.8  6.6   NEUTSPOLYS  87.90*   --    --    LYMPHOCYTES  3.40*   --    --    MONOCYTES  7.00   --    --    EOSINOPHILS  0.00   --    --    BASOPHILS  0.40   --    --    ASTSGOT  28   --    --    ALTSGPT  12   --    --    ALKPHOSPHAT  116*   --    --    TBILIRUBIN  0.7   --    --            Hemodynamics:  Temp (24hrs), Av.2 °C (99 °F), Min:37 °C (98.6 °F), Max:37.5 °C (99.5 °F)  Temperature: 37.5 °C (99.5 °F)  Pulse  Av.5  Min: 80  Max: 141Heart Rate (Monitored): 98  Blood Pressure: 137/72 mmHg, NIBP: 119/66 mmHg     Respiratory:    Respiration: 18, Pulse Oximetry: 97 %        RUL Breath Sounds: Clear, RML Breath Sounds: Clear, RLL Breath Sounds: Diminished, LUIS ALBERTO Breath Sounds: Clear, LLL Breath Sounds: Diminished  Fluids:    Intake/Output Summary (Last 24 hours) at 17  Last data filed at 17 1500   Gross per 24 hour   Intake   1285 ml   Output   3800 ml   Net  -2515 ml        GI/Nutrition:  Orders Placed This Encounter   Procedures   • DIET ORDER     Standing Status: Standing      Number of Occurrences: 1      Standing Expiration Date:      Order Specific Question:  Diet:     Answer:  Diabetic [3]     Order Specific Question:  Diet:     Answer:  Full Liquid [11]     Medical Decision Making, by Problem:  Active Hospital Problems    Diagnosis   • Type I diabetes mellitus (CMS-HCC) [E10.9]  -- continue novolin but increase to 20 units bid and continue sliding scale insulin.   • Coffee ground emesis [K92.0]  -- may need gi evaluation if the patient's hemoglobin continue to trending down.   Continue PPI.  The patient  reportedly has history of peptic ulcer disease.  No more coffee ground emesis per nursing staff.   • DKA (diabetic ketoacidoses) (CMS-HCC) [E13.10]  -- questions the patient's compliance with medication as the hospital pharmacist reported that the patient has new prescription in the community clinic which the patient has not .   Nausea  -- continue metoclopramide before each meals. zofran IV prn.  Drowsiness -- discontinue oxycodone due to excessive drowsiness.             DVT prophylaxis - mechanical: SCDs

## 2017-05-27 NOTE — PROGRESS NOTES
Patient alert and oriented.   Blood sugars controlled with scheduled and PRN insulin per orders.  Nausea/vomiting most of the shift. Medicated with antiemetics with fair relief.   GI cocktail given x1, PO tramadol x1 for epigastric burning,   Minimal PO intake today, downgraded to full liquids d/t nausea/vomiting.   SBA to bathroom.   Bed alarm for safety.

## 2017-05-28 NOTE — DISCHARGE SUMMARY
DATE OF ADMISSION:  2017    DATE OF DISCHARGE:  2017    DISCHARGE DIAGNOSES:  Includin.  Diabetic ketoacidosis, resolved.  2.  Type 1 diabetes with noncompliance to medical therapy and uncontrolled.  3.  Coffee-ground emesis, resolved.  4.  History of peptic ulcer disease.  5.  Normocytic anemia.  6.  Nausea and vomiting, resolved.  7.  Hypokalemia.  8.  Glycosuria.    PERTINENT IMAGING AND PROCEDURES:  Here in the hospital including the   following:  Patient had a chest x-ray done on 2017 and the finding of   this showed the patient has a right IJ overlie the SVC, no pneumothorax.    SUMMARY OF HOSPITAL COURSE:  Patient is a 31-year-old male who has multiple   hospital admissions with DKA and patient presented to the hospital on   2017 with DKA and with low CO2 and patient has not been very compliant   with medications.  Patient reportedly had coffee-ground emesis and nausea and   vomiting.  Patient also was found to have elevated anion gap and patient was   admitted to the intensive care unit and started on protocol insulin for   diabetic ketoacidosis and aggressive IV fluid hydration and patient's   ketoacidosis resolved and patient was then downgraded to medical floor.    Patient's, however, progress was slow and patient was overly sedated with   opiate medication and also antiemetic medications such as Compazine, I have   changed antiemetic to metoclopramide with each meal.  Patient is able to   tolerate his meal and patient was given Zofran as needed for nausea and   vomiting.  Patient's insulin was titrated to achieve hyperglycemic control and   I have discussed with the patient with multiple occasions that she needs to   be more compliant with diabetic treatment in order to prevent future   exacerbation.  Patient expressed understanding.    DISCHARGE CONDITION:  Stable.    DISPOSITION:  Home.    ACTIVITY:  As tolerated.    DIET:  Will be diabetic diet.    DISCHARGE MEDICATIONS:   Patient was discharged with these medications   including the following, including hydroxyzine 25 mg capsule 1 capsule twice   daily as needed for itching or as needed as needed for sleep, patient also on   Neurontin 300 mg capsule, he takes 600 mg by mouth 3 times a day for diabetic   neuropathy and patient also has insulin 70/30 Novolin which he is prescribed   for 23 units inject subcutaneously twice daily.  Patient also has Zofran oral   disintegrating tablet 4 mg 1 tablet by mouth every 4 hours as needed for   nausea and vomiting, potassium chloride 20 mEq 1 tab by mouth twice daily 10   tablets prescribed, patient has also short course of metoclopramide prescribed   10 mg 1 tablet to be taken by mouth 3 times a day 30 minutes before each meal   up to 15 tablets prescribed, and omeprazole is prescribed to patient 20 mg   capsule 1 capsule by mouth twice daily for up to 1 month.    FOLLOWUP INSTRUCTIONS:  I have recommended patient to follow up with primary   care provider within 1 week and also advised if patient should have any more   coffee-ground emesis, she should return to ER immediately and should patient   have any more epigastric discomfort, patient may need a gastroenterology   referral for EGD evaluation.    Total discharge time was 32 minutes.       ____________________________________     DO ALEX Ceballos / TERESA    DD:  05/27/2017 21:15:01  DT:  05/28/2017 16:47:47    D#:  [id number]  Job#:  [id number]

## 2018-06-09 ENCOUNTER — APPOINTMENT (OUTPATIENT)
Dept: RADIOLOGY | Facility: MEDICAL CENTER | Age: 33
DRG: 208 | End: 2018-06-09
Attending: INTERNAL MEDICINE
Payer: OTHER GOVERNMENT

## 2018-06-09 ENCOUNTER — HOSPITAL ENCOUNTER (OUTPATIENT)
Facility: MEDICAL CENTER | Age: 33
DRG: 208 | End: 2018-06-09
Payer: OTHER GOVERNMENT

## 2018-06-09 ENCOUNTER — HOSPITAL ENCOUNTER (INPATIENT)
Facility: MEDICAL CENTER | Age: 33
LOS: 6 days | DRG: 208 | End: 2018-06-15
Attending: HOSPITALIST | Admitting: HOSPITALIST
Payer: OTHER GOVERNMENT

## 2018-06-09 DIAGNOSIS — J93.9 PNEUMOTHORAX ON RIGHT: ICD-10-CM

## 2018-06-09 LAB
ACTION RANGE TRIGGERED IACRT: YES
ALBUMIN SERPL BCP-MCNC: 3.6 G/DL (ref 3.2–4.9)
ALBUMIN/GLOB SERPL: 1.5 G/DL
ALP SERPL-CCNC: 89 U/L (ref 30–99)
ALT SERPL-CCNC: 19 U/L (ref 2–50)
ANION GAP SERPL CALC-SCNC: 13 MMOL/L (ref 0–11.9)
AST SERPL-CCNC: 23 U/L (ref 12–45)
BASE EXCESS BLDA CALC-SCNC: -16 MMOL/L (ref -4–3)
BASOPHILS # BLD AUTO: 0.2 % (ref 0–1.8)
BASOPHILS # BLD: 0.03 K/UL (ref 0–0.12)
BILIRUB SERPL-MCNC: 0.5 MG/DL (ref 0.1–1.5)
BODY TEMPERATURE: ABNORMAL DEGREES
BUN SERPL-MCNC: 25 MG/DL (ref 8–22)
CALCIUM SERPL-MCNC: 7.7 MG/DL (ref 8.5–10.5)
CFT BLD TEG: 4.4 MIN (ref 5–10)
CHLORIDE SERPL-SCNC: 111 MMOL/L (ref 96–112)
CLOT ANGLE BLD TEG: 62.9 DEGREES (ref 53–72)
CLOT LYSIS 30M P MA LENFR BLD TEG: 0 % (ref 0–8)
CO2 BLDA-SCNC: 12 MMOL/L (ref 20–33)
CO2 SERPL-SCNC: 13 MMOL/L (ref 20–33)
CREAT SERPL-MCNC: 1.11 MG/DL (ref 0.5–1.4)
CT.EXTRINSIC BLD ROTEM: 2 MIN (ref 1–3)
EOSINOPHIL # BLD AUTO: 0 K/UL (ref 0–0.51)
EOSINOPHIL NFR BLD: 0 % (ref 0–6.9)
ERYTHROCYTE [DISTWIDTH] IN BLOOD BY AUTOMATED COUNT: 38.1 FL (ref 35.9–50)
GLOBULIN SER CALC-MCNC: 2.4 G/DL (ref 1.9–3.5)
GLUCOSE BLD-MCNC: 195 MG/DL (ref 65–99)
GLUCOSE SERPL-MCNC: 237 MG/DL (ref 65–99)
HCO3 BLDA-SCNC: 10.9 MMOL/L (ref 17–25)
HCT VFR BLD AUTO: 44.9 % (ref 42–52)
HGB BLD-MCNC: 15.2 G/DL (ref 14–18)
IMM GRANULOCYTES # BLD AUTO: 0.13 K/UL (ref 0–0.11)
IMM GRANULOCYTES NFR BLD AUTO: 0.8 % (ref 0–0.9)
INST. QUALIFIED PATIENT IIQPT: YES
LACTATE BLD-SCNC: 1.5 MMOL/L (ref 0.5–2)
LACTATE BLD-SCNC: 2 MMOL/L (ref 0.5–2)
LIPASE SERPL-CCNC: 301 U/L (ref 11–82)
LYMPHOCYTES # BLD AUTO: 1.07 K/UL (ref 1–4.8)
LYMPHOCYTES NFR BLD: 6.4 % (ref 22–41)
MCF BLD TEG: 65.5 MM (ref 50–70)
MCH RBC QN AUTO: 30.4 PG (ref 27–33)
MCHC RBC AUTO-ENTMCNC: 33.9 G/DL (ref 33.7–35.3)
MCV RBC AUTO: 89.8 FL (ref 81.4–97.8)
MONOCYTES # BLD AUTO: 1.18 K/UL (ref 0–0.85)
MONOCYTES NFR BLD AUTO: 7 % (ref 0–13.4)
NEUTROPHILS # BLD AUTO: 14.35 K/UL (ref 1.82–7.42)
NEUTROPHILS NFR BLD: 85.6 % (ref 44–72)
NRBC # BLD AUTO: 0 K/UL
NRBC BLD-RTO: 0 /100 WBC
O2/TOTAL GAS SETTING VFR VENT: 40 %
PA AA BLD-ACNC: 0 %
PA ADP BLD-ACNC: 85.9 %
PCO2 BLDA: 27.9 MMHG (ref 26–37)
PH BLDA: 7.2 [PH] (ref 7.4–7.5)
PLATELET # BLD AUTO: 299 K/UL (ref 164–446)
PMV BLD AUTO: 9.9 FL (ref 9–12.9)
PO2 BLDA: 175 MMHG (ref 64–87)
POTASSIUM SERPL-SCNC: 4.1 MMOL/L (ref 3.6–5.5)
PROT SERPL-MCNC: 6 G/DL (ref 6–8.2)
RBC # BLD AUTO: 5 M/UL (ref 4.7–6.1)
SAO2 % BLDA: 99 % (ref 93–99)
SODIUM SERPL-SCNC: 137 MMOL/L (ref 135–145)
SPECIMEN DRAWN FROM PATIENT: ABNORMAL
TEG ALGORITHM TGALG: ABNORMAL
TRIGL SERPL-MCNC: 192 MG/DL (ref 0–149)
WBC # BLD AUTO: 16.8 K/UL (ref 4.8–10.8)

## 2018-06-09 PROCEDURE — 85384 FIBRINOGEN ACTIVITY: CPT

## 2018-06-09 PROCEDURE — 94640 AIRWAY INHALATION TREATMENT: CPT

## 2018-06-09 PROCEDURE — 700105 HCHG RX REV CODE 258: Performed by: HOSPITALIST

## 2018-06-09 PROCEDURE — 700105 HCHG RX REV CODE 258: Performed by: INTERNAL MEDICINE

## 2018-06-09 PROCEDURE — 80307 DRUG TEST PRSMV CHEM ANLYZR: CPT

## 2018-06-09 PROCEDURE — 84439 ASSAY OF FREE THYROXINE: CPT

## 2018-06-09 PROCEDURE — 83690 ASSAY OF LIPASE: CPT

## 2018-06-09 PROCEDURE — C9113 INJ PANTOPRAZOLE SODIUM, VIA: HCPCS | Performed by: INTERNAL MEDICINE

## 2018-06-09 PROCEDURE — 85576 BLOOD PLATELET AGGREGATION: CPT

## 2018-06-09 PROCEDURE — 99291 CRITICAL CARE FIRST HOUR: CPT | Performed by: INTERNAL MEDICINE

## 2018-06-09 PROCEDURE — 99291 CRITICAL CARE FIRST HOUR: CPT | Performed by: HOSPITALIST

## 2018-06-09 PROCEDURE — 700101 HCHG RX REV CODE 250: Performed by: INTERNAL MEDICINE

## 2018-06-09 PROCEDURE — 94002 VENT MGMT INPAT INIT DAY: CPT

## 2018-06-09 PROCEDURE — 84478 ASSAY OF TRIGLYCERIDES: CPT

## 2018-06-09 PROCEDURE — 84443 ASSAY THYROID STIM HORMONE: CPT

## 2018-06-09 PROCEDURE — 82803 BLOOD GASES ANY COMBINATION: CPT

## 2018-06-09 PROCEDURE — 71045 X-RAY EXAM CHEST 1 VIEW: CPT

## 2018-06-09 PROCEDURE — 83605 ASSAY OF LACTIC ACID: CPT

## 2018-06-09 PROCEDURE — 5A1945Z RESPIRATORY VENTILATION, 24-96 CONSECUTIVE HOURS: ICD-10-PCS | Performed by: HOSPITALIST

## 2018-06-09 PROCEDURE — 84145 PROCALCITONIN (PCT): CPT

## 2018-06-09 PROCEDURE — 80053 COMPREHEN METABOLIC PANEL: CPT

## 2018-06-09 PROCEDURE — 700111 HCHG RX REV CODE 636 W/ 250 OVERRIDE (IP): Performed by: HOSPITALIST

## 2018-06-09 PROCEDURE — 770022 HCHG ROOM/CARE - ICU (200)

## 2018-06-09 PROCEDURE — 83036 HEMOGLOBIN GLYCOSYLATED A1C: CPT

## 2018-06-09 PROCEDURE — 85025 COMPLETE CBC W/AUTO DIFF WBC: CPT

## 2018-06-09 PROCEDURE — 85347 COAGULATION TIME ACTIVATED: CPT

## 2018-06-09 PROCEDURE — 700111 HCHG RX REV CODE 636 W/ 250 OVERRIDE (IP): Performed by: INTERNAL MEDICINE

## 2018-06-09 PROCEDURE — 36600 WITHDRAWAL OF ARTERIAL BLOOD: CPT

## 2018-06-09 PROCEDURE — 82962 GLUCOSE BLOOD TEST: CPT

## 2018-06-09 PROCEDURE — 700102 HCHG RX REV CODE 250 W/ 637 OVERRIDE(OP): Performed by: INTERNAL MEDICINE

## 2018-06-09 RX ORDER — DEXTROSE MONOHYDRATE 25 G/50ML
25 INJECTION, SOLUTION INTRAVENOUS
Status: DISCONTINUED | OUTPATIENT
Start: 2018-06-09 | End: 2018-06-09

## 2018-06-09 RX ORDER — IPRATROPIUM BROMIDE AND ALBUTEROL SULFATE 2.5; .5 MG/3ML; MG/3ML
3 SOLUTION RESPIRATORY (INHALATION)
Status: DISCONTINUED | OUTPATIENT
Start: 2018-06-09 | End: 2018-06-15 | Stop reason: HOSPADM

## 2018-06-09 RX ORDER — IPRATROPIUM BROMIDE AND ALBUTEROL SULFATE 2.5; .5 MG/3ML; MG/3ML
3 SOLUTION RESPIRATORY (INHALATION)
Status: DISCONTINUED | OUTPATIENT
Start: 2018-06-09 | End: 2018-06-12

## 2018-06-09 RX ORDER — DEXTROSE AND SODIUM CHLORIDE 5; .9 G/100ML; G/100ML
INJECTION, SOLUTION INTRAVENOUS CONTINUOUS
Status: DISCONTINUED | OUTPATIENT
Start: 2018-06-09 | End: 2018-06-09

## 2018-06-09 RX ORDER — SODIUM CHLORIDE, SODIUM LACTATE, POTASSIUM CHLORIDE, CALCIUM CHLORIDE 600; 310; 30; 20 MG/100ML; MG/100ML; MG/100ML; MG/100ML
INJECTION, SOLUTION INTRAVENOUS CONTINUOUS
Status: DISCONTINUED | OUTPATIENT
Start: 2018-06-09 | End: 2018-06-12

## 2018-06-09 RX ORDER — AMOXICILLIN 250 MG
2 CAPSULE ORAL 2 TIMES DAILY
Status: DISCONTINUED | OUTPATIENT
Start: 2018-06-09 | End: 2018-06-11

## 2018-06-09 RX ORDER — MAGNESIUM SULFATE HEPTAHYDRATE 40 MG/ML
4 INJECTION, SOLUTION INTRAVENOUS
Status: DISCONTINUED | OUTPATIENT
Start: 2018-06-09 | End: 2018-06-09

## 2018-06-09 RX ORDER — PANTOPRAZOLE SODIUM 40 MG/10ML
40 INJECTION, POWDER, LYOPHILIZED, FOR SOLUTION INTRAVENOUS 2 TIMES DAILY
Status: DISCONTINUED | OUTPATIENT
Start: 2018-06-09 | End: 2018-06-10

## 2018-06-09 RX ORDER — INSULIN GLARGINE 100 [IU]/ML
15 INJECTION, SOLUTION SUBCUTANEOUS ONCE
Status: COMPLETED | OUTPATIENT
Start: 2018-06-09 | End: 2018-06-09

## 2018-06-09 RX ORDER — POLYETHYLENE GLYCOL 3350 17 G/17G
1 POWDER, FOR SOLUTION ORAL
Status: DISCONTINUED | OUTPATIENT
Start: 2018-06-09 | End: 2018-06-11

## 2018-06-09 RX ORDER — INSULIN GLARGINE 100 [IU]/ML
15 INJECTION, SOLUTION SUBCUTANEOUS EVERY EVENING
Status: DISCONTINUED | OUTPATIENT
Start: 2018-06-10 | End: 2018-06-13

## 2018-06-09 RX ORDER — DEXTROSE AND SODIUM CHLORIDE 10; .45 G/100ML; G/100ML
INJECTION, SOLUTION INTRAVENOUS CONTINUOUS
Status: DISCONTINUED | OUTPATIENT
Start: 2018-06-09 | End: 2018-06-09

## 2018-06-09 RX ORDER — DEXTROSE MONOHYDRATE 25 G/50ML
25 INJECTION, SOLUTION INTRAVENOUS
Status: DISCONTINUED | OUTPATIENT
Start: 2018-06-09 | End: 2018-06-12

## 2018-06-09 RX ORDER — MAGNESIUM SULFATE HEPTAHYDRATE 40 MG/ML
2 INJECTION, SOLUTION INTRAVENOUS
Status: DISCONTINUED | OUTPATIENT
Start: 2018-06-09 | End: 2018-06-09

## 2018-06-09 RX ORDER — BISACODYL 10 MG
10 SUPPOSITORY, RECTAL RECTAL
Status: DISCONTINUED | OUTPATIENT
Start: 2018-06-09 | End: 2018-06-11

## 2018-06-09 RX ORDER — SODIUM CHLORIDE 9 MG/ML
2000 INJECTION, SOLUTION INTRAVENOUS ONCE
Status: DISCONTINUED | OUTPATIENT
Start: 2018-06-09 | End: 2018-06-09

## 2018-06-09 RX ORDER — SODIUM CHLORIDE 9 MG/ML
INJECTION, SOLUTION INTRAVENOUS CONTINUOUS
Status: DISCONTINUED | OUTPATIENT
Start: 2018-06-09 | End: 2018-06-09

## 2018-06-09 RX ADMIN — OCTREOTIDE ACETATE 50 MCG/HR: 200 INJECTION, SOLUTION INTRAVENOUS; SUBCUTANEOUS at 23:22

## 2018-06-09 RX ADMIN — INSULIN HUMAN 3 UNITS: 100 INJECTION, SOLUTION PARENTERAL at 23:12

## 2018-06-09 RX ADMIN — IPRATROPIUM BROMIDE AND ALBUTEROL SULFATE 3 ML: .5; 3 SOLUTION RESPIRATORY (INHALATION) at 22:42

## 2018-06-09 RX ADMIN — SODIUM CHLORIDE, POTASSIUM CHLORIDE, SODIUM LACTATE AND CALCIUM CHLORIDE: 600; 310; 30; 20 INJECTION, SOLUTION INTRAVENOUS at 22:34

## 2018-06-09 RX ADMIN — PANTOPRAZOLE SODIUM 40 MG: 40 INJECTION, POWDER, LYOPHILIZED, FOR SOLUTION INTRAVENOUS at 22:57

## 2018-06-09 RX ADMIN — FENTANYL CITRATE 100 MCG: 50 INJECTION INTRAMUSCULAR; INTRAVENOUS at 23:03

## 2018-06-09 RX ADMIN — INSULIN HUMAN 4 UNITS: 100 INJECTION, SOLUTION PARENTERAL at 22:15

## 2018-06-09 RX ADMIN — PROPOFOL 50 MCG/KG/MIN: 10 INJECTION, EMULSION INTRAVENOUS at 21:40

## 2018-06-09 RX ADMIN — INSULIN GLARGINE 15 UNITS: 100 INJECTION, SOLUTION SUBCUTANEOUS at 22:13

## 2018-06-10 ENCOUNTER — APPOINTMENT (OUTPATIENT)
Dept: RADIOLOGY | Facility: MEDICAL CENTER | Age: 33
DRG: 208 | End: 2018-06-10
Attending: INTERNAL MEDICINE
Payer: OTHER GOVERNMENT

## 2018-06-10 PROBLEM — J96.00 ACUTE RESPIRATORY FAILURE (HCC): Status: ACTIVE | Noted: 2018-06-10

## 2018-06-10 PROBLEM — E11.10 DKA (DIABETIC KETOACIDOSES): Status: ACTIVE | Noted: 2018-06-10

## 2018-06-10 PROBLEM — K92.0 HEMATEMESIS: Status: ACTIVE | Noted: 2018-06-10

## 2018-06-10 LAB
ACTION RANGE TRIGGERED IACRT: NO
AMORPH CRY #/AREA URNS HPF: PRESENT /HPF
AMPHET UR QL SCN: NEGATIVE
ANION GAP SERPL CALC-SCNC: 7 MMOL/L (ref 0–11.9)
APPEARANCE UR: ABNORMAL
BACTERIA #/AREA URNS HPF: NEGATIVE /HPF
BARBITURATES UR QL SCN: NEGATIVE
BASE EXCESS BLDA CALC-SCNC: -5 MMOL/L (ref -4–3)
BASOPHILS # BLD AUTO: 0.1 % (ref 0–1.8)
BASOPHILS # BLD: 0.01 K/UL (ref 0–0.12)
BENZODIAZ UR QL SCN: POSITIVE
BILIRUB UR QL STRIP.AUTO: NEGATIVE
BODY TEMPERATURE: ABNORMAL DEGREES
BUN SERPL-MCNC: 23 MG/DL (ref 8–22)
BZE UR QL SCN: NEGATIVE
CALCIUM SERPL-MCNC: 8.2 MG/DL (ref 8.5–10.5)
CANNABINOIDS UR QL SCN: NEGATIVE
CHLORIDE SERPL-SCNC: 113 MMOL/L (ref 96–112)
CO2 BLDA-SCNC: 20 MMOL/L (ref 20–33)
CO2 SERPL-SCNC: 20 MMOL/L (ref 20–33)
COLOR UR: YELLOW
CREAT SERPL-MCNC: 1.02 MG/DL (ref 0.5–1.4)
EOSINOPHIL # BLD AUTO: 0 K/UL (ref 0–0.51)
EOSINOPHIL NFR BLD: 0 % (ref 0–6.9)
EPI CELLS #/AREA URNS HPF: ABNORMAL /HPF
ERYTHROCYTE [DISTWIDTH] IN BLOOD BY AUTOMATED COUNT: 37.6 FL (ref 35.9–50)
EST. AVERAGE GLUCOSE BLD GHB EST-MCNC: 421 MG/DL
GLUCOSE BLD-MCNC: 137 MG/DL (ref 65–99)
GLUCOSE BLD-MCNC: 173 MG/DL (ref 65–99)
GLUCOSE BLD-MCNC: 191 MG/DL (ref 65–99)
GLUCOSE BLD-MCNC: 202 MG/DL (ref 65–99)
GLUCOSE BLD-MCNC: 208 MG/DL (ref 65–99)
GLUCOSE BLD-MCNC: 226 MG/DL (ref 65–99)
GLUCOSE BLD-MCNC: 278 MG/DL (ref 65–99)
GLUCOSE SERPL-MCNC: 174 MG/DL (ref 65–99)
GLUCOSE UR STRIP.AUTO-MCNC: >=1000 MG/DL
GRAN CASTS #/AREA URNS LPF: ABNORMAL /LPF
HBA1C MFR BLD: 16.3 % (ref 0–5.6)
HCO3 BLDA-SCNC: 18.7 MMOL/L (ref 17–25)
HCT VFR BLD AUTO: 42.1 % (ref 42–52)
HGB BLD-MCNC: 14.5 G/DL (ref 14–18)
HGB BLD-MCNC: 14.8 G/DL (ref 14–18)
HGB BLD-MCNC: 14.9 G/DL (ref 14–18)
IMM GRANULOCYTES # BLD AUTO: 0.05 K/UL (ref 0–0.11)
IMM GRANULOCYTES NFR BLD AUTO: 0.5 % (ref 0–0.9)
INST. QUALIFIED PATIENT IIQPT: YES
KETONES UR STRIP.AUTO-MCNC: >=160 MG/DL
LEUKOCYTE ESTERASE UR QL STRIP.AUTO: NEGATIVE
LIPASE SERPL-CCNC: 54 U/L (ref 11–82)
LYMPHOCYTES # BLD AUTO: 1.01 K/UL (ref 1–4.8)
LYMPHOCYTES NFR BLD: 10.4 % (ref 22–41)
MAGNESIUM SERPL-MCNC: 2 MG/DL (ref 1.5–2.5)
MCH RBC QN AUTO: 30.8 PG (ref 27–33)
MCHC RBC AUTO-ENTMCNC: 35.4 G/DL (ref 33.7–35.3)
MCV RBC AUTO: 87.2 FL (ref 81.4–97.8)
METHADONE UR QL SCN: NEGATIVE
MICRO URNS: ABNORMAL
MONOCYTES # BLD AUTO: 0.79 K/UL (ref 0–0.85)
MONOCYTES NFR BLD AUTO: 8.1 % (ref 0–13.4)
NEUTROPHILS # BLD AUTO: 7.88 K/UL (ref 1.82–7.42)
NEUTROPHILS NFR BLD: 80.9 % (ref 44–72)
NITRITE UR QL STRIP.AUTO: NEGATIVE
NRBC # BLD AUTO: 0 K/UL
NRBC BLD-RTO: 0 /100 WBC
O2/TOTAL GAS SETTING VFR VENT: 30 %
OPIATES UR QL SCN: POSITIVE
OXYCODONE UR QL SCN: NEGATIVE
PCO2 BLDA: 31.3 MMHG (ref 26–37)
PCO2 TEMP ADJ BLDA: 32.3 MMHG (ref 26–37)
PCP UR QL SCN: NEGATIVE
PH BLDA: 7.38 [PH] (ref 7.4–7.5)
PH TEMP ADJ BLDA: 7.38 [PH] (ref 7.4–7.5)
PH UR STRIP.AUTO: 5.5 [PH]
PHOSPHATE SERPL-MCNC: 1.5 MG/DL (ref 2.5–4.5)
PLATELET # BLD AUTO: 279 K/UL (ref 164–446)
PMV BLD AUTO: 10 FL (ref 9–12.9)
PO2 BLDA: 98 MMHG (ref 64–87)
PO2 TEMP ADJ BLDA: 102 MMHG (ref 64–87)
POTASSIUM SERPL-SCNC: 3.8 MMOL/L (ref 3.6–5.5)
PROCALCITONIN SERPL-MCNC: 0.65 NG/ML
PROPOXYPH UR QL SCN: NEGATIVE
PROT UR QL STRIP: 100 MG/DL
RBC # BLD AUTO: 4.83 M/UL (ref 4.7–6.1)
RBC # URNS HPF: ABNORMAL /HPF
RBC CASTS #/AREA URNS LPF: ABNORMAL /LPF
RBC UR QL AUTO: ABNORMAL
SAO2 % BLDA: 98 % (ref 93–99)
SODIUM SERPL-SCNC: 140 MMOL/L (ref 135–145)
SP GR UR STRIP.AUTO: 1.04
SPECIMEN DRAWN FROM PATIENT: ABNORMAL
T4 FREE SERPL-MCNC: 0.79 NG/DL (ref 0.53–1.43)
TSH SERPL DL<=0.005 MIU/L-ACNC: 0.17 UIU/ML (ref 0.38–5.33)
UROBILINOGEN UR STRIP.AUTO-MCNC: 0.2 MG/DL
WBC # BLD AUTO: 9.7 K/UL (ref 4.8–10.8)
WBC #/AREA URNS HPF: ABNORMAL /HPF

## 2018-06-10 PROCEDURE — 700102 HCHG RX REV CODE 250 W/ 637 OVERRIDE(OP): Performed by: INTERNAL MEDICINE

## 2018-06-10 PROCEDURE — 700101 HCHG RX REV CODE 250: Performed by: INTERNAL MEDICINE

## 2018-06-10 PROCEDURE — 80048 BASIC METABOLIC PNL TOTAL CA: CPT

## 2018-06-10 PROCEDURE — 85018 HEMOGLOBIN: CPT

## 2018-06-10 PROCEDURE — 700111 HCHG RX REV CODE 636 W/ 250 OVERRIDE (IP): Performed by: INTERNAL MEDICINE

## 2018-06-10 PROCEDURE — 74018 RADEX ABDOMEN 1 VIEW: CPT

## 2018-06-10 PROCEDURE — 94640 AIRWAY INHALATION TREATMENT: CPT

## 2018-06-10 PROCEDURE — 94003 VENT MGMT INPAT SUBQ DAY: CPT

## 2018-06-10 PROCEDURE — A9270 NON-COVERED ITEM OR SERVICE: HCPCS | Performed by: INTERNAL MEDICINE

## 2018-06-10 PROCEDURE — 700105 HCHG RX REV CODE 258: Performed by: INTERNAL MEDICINE

## 2018-06-10 PROCEDURE — 85025 COMPLETE CBC W/AUTO DIFF WBC: CPT

## 2018-06-10 PROCEDURE — 99291 CRITICAL CARE FIRST HOUR: CPT | Performed by: INTERNAL MEDICINE

## 2018-06-10 PROCEDURE — 81001 URINALYSIS AUTO W/SCOPE: CPT

## 2018-06-10 PROCEDURE — 83690 ASSAY OF LIPASE: CPT

## 2018-06-10 PROCEDURE — 94150 VITAL CAPACITY TEST: CPT

## 2018-06-10 PROCEDURE — 99233 SBSQ HOSP IP/OBS HIGH 50: CPT | Performed by: HOSPITALIST

## 2018-06-10 PROCEDURE — 83735 ASSAY OF MAGNESIUM: CPT

## 2018-06-10 PROCEDURE — 82803 BLOOD GASES ANY COMBINATION: CPT

## 2018-06-10 PROCEDURE — 770022 HCHG ROOM/CARE - ICU (200)

## 2018-06-10 PROCEDURE — 82962 GLUCOSE BLOOD TEST: CPT | Mod: 91

## 2018-06-10 PROCEDURE — 84100 ASSAY OF PHOSPHORUS: CPT

## 2018-06-10 PROCEDURE — C9113 INJ PANTOPRAZOLE SODIUM, VIA: HCPCS | Performed by: INTERNAL MEDICINE

## 2018-06-10 PROCEDURE — 71045 X-RAY EXAM CHEST 1 VIEW: CPT

## 2018-06-10 PROCEDURE — 36600 WITHDRAWAL OF ARTERIAL BLOOD: CPT

## 2018-06-10 RX ADMIN — INSULIN HUMAN 4 UNITS: 100 INJECTION, SOLUTION PARENTERAL at 19:00

## 2018-06-10 RX ADMIN — PROPOFOL 50 MCG/KG/MIN: 10 INJECTION, EMULSION INTRAVENOUS at 05:03

## 2018-06-10 RX ADMIN — OMEPRAZOLE 40 MG: 20 CAPSULE, DELAYED RELEASE ORAL at 10:18

## 2018-06-10 RX ADMIN — SODIUM CHLORIDE, POTASSIUM CHLORIDE, SODIUM LACTATE AND CALCIUM CHLORIDE: 600; 310; 30; 20 INJECTION, SOLUTION INTRAVENOUS at 15:10

## 2018-06-10 RX ADMIN — IPRATROPIUM BROMIDE AND ALBUTEROL SULFATE 3 ML: .5; 3 SOLUTION RESPIRATORY (INHALATION) at 10:51

## 2018-06-10 RX ADMIN — IPRATROPIUM BROMIDE AND ALBUTEROL SULFATE 3 ML: .5; 3 SOLUTION RESPIRATORY (INHALATION) at 06:13

## 2018-06-10 RX ADMIN — FENTANYL CITRATE 50 MCG: 50 INJECTION INTRAMUSCULAR; INTRAVENOUS at 13:34

## 2018-06-10 RX ADMIN — SODIUM CHLORIDE, POTASSIUM CHLORIDE, SODIUM LACTATE AND CALCIUM CHLORIDE: 600; 310; 30; 20 INJECTION, SOLUTION INTRAVENOUS at 05:59

## 2018-06-10 RX ADMIN — PANTOPRAZOLE SODIUM 40 MG: 40 INJECTION, POWDER, LYOPHILIZED, FOR SOLUTION INTRAVENOUS at 08:09

## 2018-06-10 RX ADMIN — PROPOFOL 40 MCG/KG/MIN: 10 INJECTION, EMULSION INTRAVENOUS at 15:10

## 2018-06-10 RX ADMIN — PROPOFOL 50 MCG/KG/MIN: 10 INJECTION, EMULSION INTRAVENOUS at 08:55

## 2018-06-10 RX ADMIN — Medication 2 TABLET: at 10:18

## 2018-06-10 RX ADMIN — POTASSIUM PHOSPHATE, MONOBASIC AND POTASSIUM PHOSPHATE, DIBASIC 30 MMOL: 224; 236 INJECTION, SOLUTION INTRAVENOUS at 09:07

## 2018-06-10 RX ADMIN — PROPOFOL 60 MCG/KG/MIN: 10 INJECTION, EMULSION INTRAVENOUS at 00:48

## 2018-06-10 RX ADMIN — INSULIN HUMAN 7 UNITS: 100 INJECTION, SOLUTION PARENTERAL at 13:07

## 2018-06-10 RX ADMIN — IPRATROPIUM BROMIDE AND ALBUTEROL SULFATE 3 ML: .5; 3 SOLUTION RESPIRATORY (INHALATION) at 22:16

## 2018-06-10 RX ADMIN — PROPOFOL 50 MCG/KG/MIN: 10 INJECTION, EMULSION INTRAVENOUS at 20:54

## 2018-06-10 RX ADMIN — INSULIN HUMAN 4 UNITS: 100 INJECTION, SOLUTION PARENTERAL at 00:19

## 2018-06-10 RX ADMIN — IPRATROPIUM BROMIDE AND ALBUTEROL SULFATE 3 ML: .5; 3 SOLUTION RESPIRATORY (INHALATION) at 18:12

## 2018-06-10 RX ADMIN — INSULIN HUMAN 3 UNITS: 100 INJECTION, SOLUTION PARENTERAL at 01:26

## 2018-06-10 RX ADMIN — IPRATROPIUM BROMIDE AND ALBUTEROL SULFATE 3 ML: .5; 3 SOLUTION RESPIRATORY (INHALATION) at 14:34

## 2018-06-10 RX ADMIN — INSULIN GLARGINE 15 UNITS: 100 INJECTION, SOLUTION SUBCUTANEOUS at 20:54

## 2018-06-10 RX ADMIN — IPRATROPIUM BROMIDE AND ALBUTEROL SULFATE 3 ML: .5; 3 SOLUTION RESPIRATORY (INHALATION) at 01:57

## 2018-06-10 RX ADMIN — FENTANYL CITRATE 100 MCG: 50 INJECTION INTRAMUSCULAR; INTRAVENOUS at 23:50

## 2018-06-10 ASSESSMENT — PULMONARY FUNCTION TESTS: FVC: 1.2

## 2018-06-10 NOTE — PROGRESS NOTES
"Pulmonary Critical Care Progress Note        Admission Date: 6/9/2018    Chief Complaint: DKA, VDRF    History of Present Illness: \"This is a 32-year-old male, who was transferred from an outside facility.  All information taken from chart review as well as sign-out as patient presently on full mechanical ventilatory support.  It appears that he has a longstanding history of insulin-dependent diabetes, chronic tobacco use, back pain, and homelessness.  The best I can gather from the chart is that he has not taken his insulin in approximately 8 days.  He presented to outside facility with nausea, vomiting, and bloody vomitus.  The patient was given subQ as well as IV insulin, started on IV fluids.  After noted blood in his emesis, started on Protonix as well as octreotide and at some point had developed respiratory failure requiring intubation and subsequently transferred to [facility] for higher level of care.   At the present time, he is on full mechanical ventilatory support, sedated with propofol.  Hemodynamics appear intact, not requiring any pressor therapy.  Upon initial arrival, he did have one single episode of emesis, which was more brown, cloudy in color, no obvious coffee-ground or bright red appearance.  No further information at the present time currently available.\"    Review of Systems   Unable to perform ROS: Intubated       Interval Events:  24 hour interval history reviewed   - admitted ON for VDRF, DKA - now resolved   - Neuro: withdraws in all 4, heavily sedated   - HR: 110s-130s   - BP: 120s systolic   - GI: emesis x1, NPO   - UOP: oliguric   - Conner: yes   - Tm: 100.4   - Lines: CVC   - PPx: GI omeprazole, DVT SCDs given possible GIB   - ABG: mild metabolic acidosis   - CXR (personally reviewed and compared to prior): clear    PFSH:  No change.    Physical Exam   Constitutional: He appears healthy. He has a sickly appearance. He is intubated.   HENT:   Head: Normocephalic and atraumatic.   Right " Ear: External ear normal.   Left Ear: External ear normal.   Nose: Nose normal.   Eyes: Conjunctivae are normal. Right eye exhibits no discharge. Left eye exhibits no discharge. No scleral icterus.   Neck: Neck supple. No tracheal deviation present.   Cardiovascular: Normal rate, regular rhythm and intact distal pulses.    No murmur heard.  Pulmonary/Chest: He is intubated. He has no rhonchi. He has rales.   Abdominal: Soft. Bowel sounds are normal. He exhibits no distension. There is no guarding.   Musculoskeletal: Normal range of motion. He exhibits no edema.   Neurological:   Withdraws in all 4 extremities, localizes to pain.  Currently sedated on 40 of propofol   Skin:   Multiple tattoos   Nursing note and vitals reviewed.      Respiratory:  Frey Vent Mode: APVCMV, Rate (breaths/min): 20, Vt Target (mL): 420, PEEP/CPAP: 8, FiO2: 30, Static Compliance (ml / cm H2O): 61.2, Control VTE (exp VT): 422  Pulse Oximetry: 100 %  Chest Tube Drains:  ImagingAvailable data reviewed   Recent Labs      06/09/18   2040  06/10/18   0535   ISTATAPH  7.199*  7.385*   ISTATAPCO2  27.9  31.3   ISTATAPO2  175*  98*   ISTATATCO2  12*  20   [id number]  99  98   ISTATARTHCO3  10.9*  18.7   ISTATARTBE  -16*  -5*   ISTATTEMP  see below  99.9 F   ISTATFIO2  40  30   ISTATSPEC  Arterial  Arterial   ISTATAPHTC   --   7.375*   [id number]   --   102*       HemoDynamics:  Pulse: (!) 120, Heart Rate (Monitored): (!) 119  Blood Pressure: 133/89, NIBP: 121/87     Imaging: Available data reviewed        Neuro:  GCS    Imaging: Available data reviewed    Fluids:  Intake/Output       06/08/18 0700 - 06/09/18 0659 (Not Admitted) 06/09/18 0700 - 06/10/18 0659 06/10/18 0700 - 06/11/18 0659      [id number] 6534-9641 Total 5642-6869 6365-2544 Total 4981-6594 7646-7929 Total       Intake    I.V.  --  -- --  --  242.8 242.8  --  -- --    Octreotide Volume -- -- -- -- 66.3 66.3 -- -- --    Propofol Volume -- -- -- -- 176.5 176.5 -- -- --    Total  Intake -- -- -- -- 242.8 242.8 -- -- --       Output    Urine  --  -- --  --  512 512  --  -- --    Output (mL) (Urinary Catheter Indwelling Catheter 16 fr) -- -- -- -- 512 512 -- -- --    Emesis/NG output  --  -- --  --  250 250  --  -- --    Output (mL) (Enteral Tube Right Nare Nasogastric) -- -- -- -- 250 250 -- -- --    Total Output -- -- -- -- 762 762 -- -- --       Net I/O     -- -- -- -- -519.2 -519.2 -- -- --        Weight: 63 kg (138 lb 14.2 oz)  Recent Labs      06/09/18   2044  06/10/18   0154  06/10/18   0527   SODIUM  137  140   --    POTASSIUM  4.1  3.8   --    CHLORIDE  111  113*   --    CO2  13*  20   --    BUN  25*  23*   --    CREATININE  1.11  1.02   --    MAGNESIUM   --    --   2.0   PHOSPHORUS   --    --   1.5*   CALCIUM  7.7*  8.2*   --        GI/Nutrition:  Imaging: Available data reviewed  NPO  Liver Function  Recent Labs      06/09/18   2044  06/10/18   0154  06/10/18   0527   ALTSGPT  19   --    --    ASTSGOT  23   --    --    ALKPHOSPHAT  89   --    --    TBILIRUBIN  0.5   --    --    LIPASE  301*   --   54   GLUCOSE  237*  174*   --        Heme:  Recent Labs      06/09/18   2044  06/10/18   0527   RBC  5.00  4.83   HEMOGLOBIN  15.2  14.9   HEMATOCRIT  44.9  42.1   PLATELETCT  299  279       Infectious Disease:  Monitored Temp 2  Av.1 °C (98.8 °F)  Min: 35.2 °C (95.4 °F)  Max: 38 °C (100.4 °F)  Temp  Av.5 °C (97.7 °F)  Min: 36.5 °C (97.7 °F)  Max: 36.5 °C (97.7 °F)  Micro: antibiotics reviewed and cultures reviewed  Recent Labs      06/09/18   2044  06/10/18   0527   WBC  16.8*  9.7   NEUTSPOLYS  85.60*  80.90*   LYMPHOCYTES  6.40*  10.40*   MONOCYTES  7.00  8.10   EOSINOPHILS  0.00  0.00   BASOPHILS  0.20  0.10   ASTSGOT  23   --    ALTSGPT  19   --    ALKPHOSPHAT  89   --    TBILIRUBIN  0.5   --      Current Facility-Administered Medications   Medication Dose Frequency Provider Last Rate Last Dose   • potassium phosphates 30 mmol in D5W 500 mL ivpb  30 mmol Once Pratik Lion  LEELA Vincent.       • propofol (DIPRIVAN) injection  0-80 mcg/kg/min Continuous Chris Fuentes M.D. 18.9 mL/hr at 06/10/18 0600 50 mcg/kg/min at 06/10/18 0600   • Respiratory Care per Protocol   Continuous RT Chris Fuentes M.D.       • senna-docusate (PERICOLACE or SENOKOT S) 8.6-50 MG per tablet 2 Tab  2 Tab BID Chris Fuentes M.D.   Stopped at 06/09/18 2145    And   • polyethylene glycol/lytes (MIRALAX) PACKET 1 Packet  1 Packet QDAY PRN Chris Fuentes M.D.        And   • magnesium hydroxide (MILK OF MAGNESIA) suspension 30 mL  30 mL QDAY PRN Chris Fuentes M.D.        And   • bisacodyl (DULCOLAX) suppository 10 mg  10 mg QDAY PRN Chris Fuentes M.D.       • MD ALERT...Adult ICU Electrolyte Replacement per Pharmacy Protocol   pharmacy to dose Chris Fuentes M.D.       • fentaNYL (SUBLIMAZE) injection 25 mcg  25 mcg Q HOUR PRN Chris Fuentes M.D.        Or   • fentaNYL (SUBLIMAZE) injection 50 mcg  50 mcg Q HOUR PRN Chris Fuentes M.D.        Or   • fentaNYL (SUBLIMAZE) injection 100 mcg  100 mcg Q HOUR PRN Chris Fuentes M.D.   100 mcg at 06/09/18 2303   • ipratropium-albuterol (DUONEB) nebulizer solution  3 mL Q2HRS PRN (RT) Chris Fuentes M.D.       • ipratropium-albuterol (DUONEB) nebulizer solution  3 mL Q4HRS (RT) Chris Fuentes M.D.   3 mL at 06/10/18 0613   • pantoprazole (PROTONIX) injection 40 mg  40 mg BID Chris Fuentes M.D.   40 mg at 06/09/18 2257   • lactated ringers infusion   Continuous Chris Fuentes M.D. 100 mL/hr at 06/10/18 0559     • insulin glargine (LANTUS) injection 15 Units  15 Units Q EVENING Chris Fuentes M.D.       • insulin regular (HUMULIN R) injection 3-14 Units  3-14 Units Q6HRS Chris Fuentes M.D.   Stopped at 06/10/18 0700    And   • glucose 4 g chewable tablet 16 g  16 g Q15 MIN PRN Chris Fuentes M.D.        And   • dextrose 50% (D50W) injection 25 mL  25 mL Q15 MIN PRN Chris Fuentes M.D.       • octreotide (SANDOSTATIN) 1,250 mcg in  mL  Infusion  50 mcg/hr Continuous Naomi Mahajan M.D. 10 mL/hr at 06/10/18 0600 50 mcg/hr at 06/10/18 0600     Last reviewed on 6/10/2018  3:25 AM by Fer Epps R.N.    Quality  Measures:  Labs reviewed, Medications reviewed and Radiology images reviewed  Conner catheter: Critically Ill - Requiring Accurate Measurement of Urinary Output  Central line in place: Need for access and Shock    DVT Prophylaxis: Contraindicated - High bleeding risk  DVT prophylaxis - mechanical: SCDs  Ulcer prophylaxis: Yes  Antibiotics: Treating active infection/contamination beyond 24 hours perioperative coverage      Problems/Plan:  Acute hypoxic respiratory failure   - Intubated 6/9/2018 for airway protection at OSH   - RT/O2 protocols   - Daily and PRN ABGs   - Titration of ventilator therapy based on ABGs and patient's status   - Sedation as tolerated/indicated   - Daily CXR   - HOB >30 degrees and peridex for VAP prevention   - Pepcid for GI prophylaxis   - SAT/SBT when able (ABCDEF Bundle)   - Early mobility   - SBT and sedation wean today    Diabetic ketoacidosis   -Improved, transition to sliding scale   - Lantus 15 units for basal insulin, sliding scale insulin, accuchecks   - hypoglycemia protocol    Hematemesis   -No evidence of hematemesis in emesis here   -Change Protonix to omeprazole   -Restart DVT prophylaxis if Hg stable in 24 hours    Leukocytosis with left shift   - improved   - likely non-infectious (stress reaction)    Acute kidney injury   - remains oliguric   - IVF   - renal dose meds, avoid nephrotoxins    Anion gap metabolic acidosis   - improved, continue resuscitation    Insulin-dependent diabetes   - lantus, sliding scale insulin, accuchecks   - hypoglycemia protocol   - HgA1c pending      Hypophosphatemia   - repletion    Homelessness   - social work consult    Incomplete medical database    Discussed patient condition and risk of morbidity and/or mortality with RN, RT, Pharmacy, , Charge  nurse / hot rounds and hospitalist.      The patient remains critically ill.  Critical care time = 39 minutes in directly providing and coordinating critical care and extensive data review.  No time overlap and excludes procedures.

## 2018-06-10 NOTE — CARE PLAN
Problem: Infection  Goal: Will remain free from infection  Outcome: PROGRESSING AS EXPECTED    Intervention: Assess signs and symptoms of infection  assessed  Intervention: Implement standard precautions and perform hand washing before and after patient contact  implemented  Intervention: Give CDC handouts for infection prevention (infection prevention/hand washing, disease specific, and device specific) and document in Education  provided  Intervention: Assess for removal of potential routes of infection, such as IV, central line, intra-arterial or urinary catheters  assessed

## 2018-06-10 NOTE — PROGRESS NOTES
Late Entry due to patient care: Patient arrived and hooked up to monitor at 2012 via care flight. Pt intubated, sedated, on propfol at 25 mcg/kg/min. Patient came in with davey catheter which was switched out for a new one. Patient vital signs stable, patient is restless but not following commands or opening eyes; pt vomited dark brown emesis around ETT; patient was admitted with NG tube already in place. Orders to place to suction per Dr. Fuentes. Dr. Fuentes, Dr. Jennings, and Dr. Mahajan all rounded on patient and placed orders. Will continue to monitor pt.

## 2018-06-10 NOTE — CARE PLAN
Problem: Ventilation Defect:  Goal: Ability to achieve and maintain unassisted ventilation or tolerate decreased levels of ventilator support    Intervention: Support and monitor invasive and noninvasive mechanical ventilation  Adult Ventilation Update    Total Vent Days: 2    Patient Lines/Drains/Airways Status    Active Airway     Name: Placement date: Placement time: Site: Days:    Airway Group ET Tube Oral 8.0 @ 23 06/09/18      Oral   2              20 420 +8 30%      In the last 24 hours, the patient tolerated SBT for 1hr on settings of 5/8.      Wean parameters for this SBT were:  #FVC / Vital Capacity (liters) : 1.2 (06/10/18 1729)  NIF (cm H2O) : -30 (06/10/18 1729)  Rapid Shallow Breathing Index (RR/VT): 58 (06/10/18 1729)  RR (bpm): 21 (06/10/18 1729)  Spontaneous VE: 8.5 (06/10/18 1729)  Spontaneous VT: 392 (06/10/18 1729)         Events/Summary/Plan: Initiated SBTS. No changes made, will continue to monitor.

## 2018-06-10 NOTE — PROGRESS NOTES
Patient is a direct admit from [facility].   Dr Jennings is accepting  the patient.   ADT signed and held, needs to be released when the patient arrives on the unit.

## 2018-06-10 NOTE — PROGRESS NOTES
[facility] Hospitalist Progress Note    Date of Service: 6/10/2018    Chief Complaint  32 y.o. male admitted 2018 with DKA, UGIB and acute resp failure.  Presented to an outlying facility.  Had apparently not been taking his insulin.  Intubated and transferred to us.      PMHx: TIDM, tobacco abuse, chronic back pain, homeless    Interval Problem Update  Sinus tach  38.2  Prop 50  Octreotide  LR 100ml/hr  UOP marginal    Consultants/Specialty  Pulmonology    Disposition  cont's to be critically ill on vent in ICU        Review of Systems   Unable to perform ROS: Intubated      Physical Exam  Laboratory/Imaging   Hemodynamics  Temp (24hrs), Av.5 °C (97.7 °F), Min:36.5 °C (97.7 °F), Max:36.5 °C (97.7 °F)   Temperature: 36.5 °C (97.7 °F), Monitored Temp: 37.8 °C (100 °F)  Pulse  Av  Min: 53  Max: 129 Heart Rate (Monitored): (!) 119  Blood Pressure: 133/89, NIBP: 109/74      Respiratory  Frey Vent Mode: APVCMV, Rate (breaths/min): 20, PEEP/CPAP: 8, PEEP/CPAP: 8, FiO2: 30, P Peak (PIP): 16, P MEAN: 11   Respiration: 20, Pulse Oximetry: 100 %     Work Of Breathing / Effort: Vented  RUL Breath Sounds: Clear;Diminished, RML Breath Sounds: Clear;Diminished, RLL Breath Sounds: Clear;Diminished, LUIS ALBERTO Breath Sounds: Clear;Diminished, LLL Breath Sounds: Clear;Diminished    Fluids    Intake/Output Summary (Last 24 hours) at 06/10/18 0557  Last data filed at 06/10/18 0400   Gross per 24 hour   Intake           185.03 ml   Output              472 ml   Net          -286.97 ml       Nutrition  Orders Placed This Encounter   Procedures   • Diet NPO     Standing Status:   Standing     Number of Occurrences:   1     Order Specific Question:   Type:     Answer:   Now [1]     Order Specific Question:   Restrict to:     Answer:   Strict [1]     Physical Exam   Constitutional: He appears well-developed and well-nourished. No distress.   HENT:   Head: Normocephalic and atraumatic.   Eyes: Conjunctivae are normal.   Neck: No JVD  present.   Cardiovascular: Normal rate.  Exam reveals no gallop.    No murmur heard.  Pulmonary/Chest: No stridor. No respiratory distress. He has no wheezes. He has no rales.   Abdominal: Soft. There is no tenderness. There is no rebound and no guarding.   Musculoskeletal: He exhibits no edema.   Skin: Skin is warm and dry. No rash noted. He is not diaphoretic.   Nursing note and vitals reviewed.      Recent Labs      06/09/18   2044  06/10/18   0527   WBC  16.8*  9.7   RBC  5.00  4.83   HEMOGLOBIN  15.2  14.9   HEMATOCRIT  44.9  42.1   MCV  89.8  87.2   MCH  30.4  30.8   MCHC  33.9  35.4*   RDW  38.1  37.6   PLATELETCT  299  279   MPV  9.9  10.0     Recent Labs      06/09/18   2044  06/10/18   0154   SODIUM  137  140   POTASSIUM  4.1  3.8   CHLORIDE  111  113*   CO2  13*  20   GLUCOSE  237*  174*   BUN  25*  23*   CREATININE  1.11  1.02   CALCIUM  7.7*  8.2*             Recent Labs      06/09/18 2044   TRIGLYCERIDE  192*          Assessment/Plan     * DKA (diabetic ketoacidoses) (HCC)   Assessment & Plan    AG and CO2 normalized  Cont to follow closely  Start lantus and SSI        Hematemesis   Assessment & Plan    Probable MW tear given presentation and no significant drop in Hgb  Cont PPI  Follow serial H&H  Low threshold to consult GI  Dc octreotide        Acute respiratory failure (HCC)   Assessment & Plan    Intubated 6/10  Pulmonology following        Leukocytosis- (present on admission)   Assessment & Plan    Suspect that this is reactive, he is afebrile with stable vital signs although he does continue to be tachycardic in the setting of his DKA. Chest x-ray is negative for evidence of infection, I will check a UA for completeness prime holding off on any antibiotic therapy at this time.          Quality-Core Measures   Reviewed items::  Radiology images reviewed, EKG reviewed, Labs reviewed and Medications reviewed

## 2018-06-10 NOTE — PROGRESS NOTES
Late Entry due to patient care:  Hospitalist at patient bedside.updated her on patient condition and that there is no family present. Asked if Dr. Mahajan wanted to repeat a lactic acid level since last one was 2.0 and asked her to clarify if she wanted H&H drawn q6h or q8h. Orders to draw H&H q8h with next one to be at 0400. No orders for lactic acid given. Will continue to monitor pt.

## 2018-06-10 NOTE — CARE PLAN
Problem: Bowel/Gastric:  Goal: Normal bowel function is maintained or improved  Outcome: PROGRESSING AS EXPECTED  Patient has had two dark tarry BM since 0500. MD aware. Will continue with current care as ordered by MD and continue to monitor Hgb/Hct as ordered.  Goal: Will not experience complications related to bowel motility  Outcome: PROGRESSING AS EXPECTED   06/10/18 0753   OTHER   Last BM 06/10/18   Number of Times Stooled 1       Problem: Skin Integrity  Goal: Risk for impaired skin integrity will decrease  Outcome: PROGRESSING AS EXPECTED  No new skin issues at this time. Will continue to monitor. preventative measures in place

## 2018-06-10 NOTE — PROGRESS NOTES
Dr. Fuentes at patient bedside. Mentioned pt has only had 160 ml of urine output since admission. Pt currently on LR at 100 ml/hr. No current orders will continue to monitor.

## 2018-06-10 NOTE — CONSULTS
DATE OF SERVICE:  06/09/2018    PULMONARY CRITICAL CARE CONSULTATION    REQUESTING PHYSICIAN:  Crispin Jennings MD    REASON FOR REQUEST:  Acute hypoxic respiratory failure, DKA, and hematemesis.    HISTORY OF PRESENT ILLNESS:  This is a 32-year-old male, who was transferred   from an outside facility.  All information taken from chart review as well as   sign-out as patient presently on full mechanical ventilatory support.  It   appears that he has a longstanding history of insulin-dependent diabetes,   chronic tobacco use, back pain, and homelessness.  The best I can gather from   the chart is that he has not taken his insulin in approximately 8 days.  He   presented to outside facility with nausea, vomiting, and bloody vomitus.  The   patient was given subQ as well as IV insulin, started on IV fluids.  After   noted blood in his emesis, started on Protonix as well as octreotide and at   some point had developed respiratory failure requiring intubation and   subsequently transferred to [facility] for higher level of care.   At the present   time, he is on full mechanical ventilatory support, sedated with propofol.    Hemodynamics appear intact, not requiring any pressor therapy.  Upon initial   arrival, he did have one single episode of emesis, which was more brown,   cloudy in color, no obvious coffee-ground or bright red appearance.  No   further information at the present time currently available.    ALLERGIES:  No known drug allergies.    OUTPATIENT MEDICATIONS:  Per EPIC indicate Zofran, Novolin 70/30, 23 units   b.i.d., K-Dur, Prilosec, Reglan, Vistaril, and Neurontin.    FAMILY HISTORY:  Unable to obtain.    PAST MEDICAL HISTORY:  As indicated above in HPI.    SOCIAL HISTORY:  Reported homelessness, tobacco.  Uncertain about alcohol.    REVIEW OF SYSTEMS:  Unable to obtain given clinical state.    PHYSICAL EXAMINATION:  VITAL SIGNS:  Currently afebrile, pulse rate 112, blood pressure 136/87,   satting 100% on 44%  FiO2 with a respiratory rate of 26.  GENERAL:  Appears older than stated age, very thin in appearance, currently   sedated.  HEENT:  Normocephalic, atraumatic.  Anicteric.  CARDIOVASCULAR:  Tachycardic rate, regular rhythm.  RESPIRATORY:  Clear to auscultation bilaterally.  No wheezes, rales, or   rhonchi.  ABDOMEN:  Soft, nondistended.  Hypoactive bowel sounds.  EXTREMITIES:  Without edema.  NEUROLOGIC:  Currently sedated, moving all  4s.  PSYCHIATRIC:  Unable to assess.    LABORATORY RESULTS:  Labs from outside facility showed a VBG with a pH 6.9,   pCO2 of 40, pO2 of 33, bicarbonate of 9, saturation of 39%.  CBC with a white   count of 17, H and H of 17 and 52, platelet count 325, positive left shift, no   bands.  BUN was 28, glucose 438, creatinine 1.5, sodium 135, potassium 4.5,   chloride 99, bicarbonate 8, anion gap 28, magnesium 2.4, calcium 8.9, phos   5.3, AST 37, ALT 29, alkaline phosphatase 122.  Lactic acid was 3.    ASSESSMENT:  1.  Acute hypoxic respiratory failure.  2.  Diabetic ketoacidosis.  3.  Hematemesis.  4.  Leukocytosis with left shift.  5.  Acute kidney injury.  6.  Anion gap metabolic acidosis.  7.  Insulin-dependent diabetes.  8.  Homelessness.  9.  Incomplete medical database.    PLAN:  Again, this is a 32-year-old gentleman that is homeless, presented to   the hospital with nausea, vomiting, and reports of hematemesis as well as   identified having diabetic ketoacidosis and admitting to not having taken his   insulin in approximately 8 days, at some point developed worsening respiratory   failure requiring intubation and full mechanical ventilatory support.  At   this present time, we will continue full vent support adjusting ventilator   based on ABG and pulmonary mechanics.  He will be on RT and O2 protocols.  We   will evaluate for any significant secretions suggestive of aspiration.  If   indeed this is the case, we will consider bronchoscopy with aspiration and BAL   as well as  initiation of empiric antibiotic therapy.  I will check a   procalcitonin.  As for his diabetic ketoacidosis, gap has dramatically   improved to 13 with a bicarbonate at this point of 13, we will continue with   IV fluids.  We will attempt to utilize subQ insulin with Lantus dosing now as   well as aggressive sliding scale.  Repeating BMP in 4 hours to evaluate   whether gap continues to close and stabilize; otherwise, at that point, we   will initiate insulin infusion.  At this time, we will continue q.1   fingersticks as well as electrolyte replacement protocol evaluating magnesium,   phosphorus, and potassium.  He will be placed on Protonix 40 b.i.d. given the   concern of hematemesis.  He is not hemodynamically unstable at this point.    Most recent episode of emesis at [facility] did not show any coffee-ground or   bright red blood or blood streaking.  Nonetheless, we will check q. 6 hours H   and H at this time and transfuse to maintain hemoglobin greater than 7.  Once   the patient is more stable, may benefit from EGD evaluation.  However, suspect   this might be Sylwia-Dobson tear versus other form of gastritis, less likely   variceal bleed given clinical picture at this time.  We will check lipase as   well as consider CT abdomen and pelvis if continues to be problematic;   however, he does appear to have a history of gastroparesis and evaluation of   his reported home medications.    Prognosis is guarded.    Total critical care time not including billable procedure is 35 minutes.       ____________________________________     MD MAGALIS Murguia / TERESA    DD:  06/09/2018 22:00:54  DT:  06/10/2018 02:55:03    D#:  [id number]  Job#:  [id number]

## 2018-06-10 NOTE — H&P
Hospital Medicine History and Physical    Date of Service  6/9/2018    Chief Complaint  Transferred from outside facility for higher level of care in the setting of DKA and new hematemesis with respiratory failure    History of Presenting Illness  32 y.o. male who presented on 6/9/2018 in transfer from an outside hospital with respiratory failure, DKA, and hematemesis. Patient is currently intubated and sedated and unable to provide any history therefore history is obtained and review of the medical records. In short, the patient was brought by EMS to an outside hospital with complaints of nausea, vomiting, and elevated blood sugars. He apparently had been off of his home medications for the past 8 days. When he was assessed at the outside facility, he had vomited bright red blood and was noted to be in DKA. He was given 5 L of normal saline and started on insulin. Unfortunately, his mentation apparently worsened and he required emergent intubation prior to transfer to our facility for higher level of care. ABG obtained notable for pH 6.9, PCO2 40.4, bicarbonate 24, sodium 135, potassium 4.5, chloride 99, CO2 8, BUN 20, creatinine 1.5, anion gap 28, sugars 438, WBC 17.3, hemoglobin 17.4, hematocrit 52, platelets 225.        Primary Care Physician  Pcp Pt States None    Consultants  Dr. Fuentes, pulmonary medicine    Code Status  Full    Review of Systems  Review of Systems   Unable to perform ROS: Intubated        Past Medical History  Past Medical History:   Diagnosis Date   • Diabetes    • Hypertension    • Seizure ([facility])        Surgical History  Unable to obtain from patient who cannot effectively communicate    Medications  No current facility-administered medications on file prior to encounter.      Current Outpatient Prescriptions on File Prior to Encounter   Medication Sig Dispense Refill   • ondansetron (ZOFRAN ODT) 4 MG TABLET DISPERSIBLE Take 1 Tab by mouth every four hours as needed for  Nausea/Vomiting. 20 Tab 0   • insulin 70/30 (HUMULIN,NOVOLIN) (70-30) 100 UNIT/ML Suspension Inject 23 Units as instructed 2 Times a Day. 10 mL 0   • potassium chloride SA (KDUR) 20 MEQ Tab CR Take 1 Tab by mouth 2 times a day. 10 Tab 0   • omeprazole (PRILOSEC) 20 MG delayed-release capsule Take 1 Cap by mouth 2 Times a Day. 60 Cap 0   • metoclopramide (REGLAN) 10 MG Tab Take 1 Tab by mouth 3 times a day before meals. 15 Tab 0   • hydrOXYzine (VISTARIL) 25 MG Cap Take 1 Cap by mouth 2 times a day as needed for Itching. sleep 20 Cap 0   • gabapentin (NEURONTIN) 300 MG Cap Take 600 mg by mouth 3 times a day.         Family History  Unable to obtain from patient who cannot effectively communicate    Social History  Social History   Substance Use Topics   • Smoking status: Current Every Day Smoker     Packs/day: 0.50     Years: 17.00     Types: Cigarettes   • Smokeless tobacco: Not on file   • Alcohol use Yes      Comment: socially       Allergies  No Known Allergies     Physical Exam  Laboratory   Hemodynamics  No data recorded.   Monitored Temp: 35.2 °C (95.4 °F)  Pulse  Av  Min: 53  Max: 117 Heart Rate (Monitored): (!) 108  Blood Pressure: 133/89, NIBP: 136/87      Respiratory  Frey Vent Mode: APVCMV, Rate (breaths/min): 24, PEEP/CPAP: 8, PEEP/CPAP: 8, FiO2: 30, P Peak (PIP): 18, P MEAN: 11   Respiration: (!) 26, Pulse Oximetry: 100 %     Work Of Breathing / Effort: Vented       Physical Exam   Constitutional: No distress.   HENT:   Head: Normocephalic and atraumatic.   Right Ear: External ear normal.   Left Ear: External ear normal.   Eyes: EOM are normal. Right eye exhibits no discharge. Left eye exhibits no discharge.   Neck: Neck supple. No JVD present.   Cardiovascular: Normal rate, regular rhythm and normal heart sounds.    Pulmonary/Chest: Effort normal and breath sounds normal. No respiratory distress. He exhibits no tenderness.   Intubated and sedated   Abdominal: Soft. Bowel sounds are normal. He  exhibits no distension. There is no tenderness.   Musculoskeletal: He exhibits no edema.   Skin: Skin is dry. He is not diaphoretic. No erythema.   Nursing note and vitals reviewed.    Capillary refill less than 3 seconds, distal pulses intact    Recent Labs      06/09/18 2044   WBC  16.8*   RBC  5.00   HEMOGLOBIN  15.2   HEMATOCRIT  44.9   MCV  89.8   MCH  30.4   MCHC  33.9   RDW  38.1   PLATELETCT  299   MPV  9.9         No results for input(s): ALTSGPT, ASTSGOT, ALKPHOSPHAT, TBILIRUBIN, DBILIRUBIN, GAMMAGT, AMYLASE, LIPASE, ALB, PREALBUMIN, GLUCOSE in the last 72 hours.              No results found for: TROPONINI    Imaging  Dx-chest-portable (1 View)    Result Date: 6/9/2018 6/9/2018 8:40 PM HISTORY/REASON FOR EXAM: Shortness of breath. Endotracheal tube and central venous line placement TECHNIQUE/EXAM DESCRIPTION AND NUMBER OF VIEWS: Single portable view of the chest. COMPARISON: 5/25/2017 FINDINGS: The heart, mediastinum, and teddy are unremarkable. The lungs are well expanded and show no evidence of infiltrate or other acute process. There is no obvious pleural effusion. The bony thorax is grossly normal. An endotracheal tube is present, the tip of  which is about 5 cm above the laquita. A right central venous line is present, the tip of which projects in the area of the upper SVC. As implied above, there is no pneumothorax.     No acute cardiopulmonary abnormality. Endotracheal tube and right central venous line in place as noted above.     Assessment/Plan     Anticipate that patient will need greater than 2 midnights for management of the discussed medical issues.    * DKA (diabetic ketoacidoses) (HCC)   Assessment & Plan    Patient was admitted 2 times for this in the last year. He reportedly received a 5 L bolus of normal saline but hasn't yet had significant urine output. I'll continue IV maintenance fluid and start him on the DKA protocol with an insulin drip. I'll correct any electrolyte imbalances.   We'll monitor every 4 hour chemistry checks to ensure he has correction of his hypocapnia and closure of his anion gap.        Hematemesis   Assessment & Plan    Patient had an episode of emesis upon arrival to our facility, it was dark but not red. Additionally, his hemoglobin level is currently normal. As he is intubated, he is not able to provide any history. Review of the records notes that he has a history of alcohol abuse therefore variceal bleed would be of concern although he has no active bleeding at this point. I'll continue him on the IV PPI as well as octreotide and trend hemoglobin levels. If they remain stable, we'll consider transitioning off of these medications. If there are any changes however, then we'll have low tolerance for GI consultation.        Acute respiratory failure (HCC)   Assessment & Plan    Patient was emergently intubated at the outside facility, I'm unclear if this was because altered mentation and inability to protect airway. His chest x-ray is negative for any evidence of infection. Dr. Fuentes of pulmonary medicine is following and I appreciate his assistance.        Leukocytosis- (present on admission)   Assessment & Plan    Suspect that this is reactive, he is afebrile with stable vital signs although he does continue to be tachycardic in the setting of his DKA. Chest x-ray is negative for evidence of infection, I will check a UA for completeness prime holding off on any antibiotic therapy at this time.          Prophylaxis: Sequential compression devices for DVT prophylaxis,  PPI indicated, bowel protocol as needed    I spent a total of 35 minutes of critical care time during this clinical encounter of which > 50% was devoted to counseling where able and coordinating care including review of records, pertinent lab data and studies, as well as discussing diagnostic evaluation and work up, planned therapeutic interventions and future disposition of care. Where indicated, the  assessment and plan reflect discussion of patient with consultants, other healthcare providers, family members, and additional research needed to obtain further information in formulating the plan of care of this patient. This time includes no procedures or overlap with other providers.

## 2018-06-11 ENCOUNTER — APPOINTMENT (OUTPATIENT)
Dept: RADIOLOGY | Facility: MEDICAL CENTER | Age: 33
DRG: 208 | End: 2018-06-11
Attending: HOSPITALIST
Payer: OTHER GOVERNMENT

## 2018-06-11 ENCOUNTER — APPOINTMENT (OUTPATIENT)
Dept: RADIOLOGY | Facility: MEDICAL CENTER | Age: 33
DRG: 208 | End: 2018-06-11
Attending: INTERNAL MEDICINE
Payer: OTHER GOVERNMENT

## 2018-06-11 PROBLEM — J95.811 POSTPROCEDURAL PNEUMOTHORAX: Status: ACTIVE | Noted: 2018-06-11

## 2018-06-11 LAB
ACTION RANGE TRIGGERED IACRT: NO
ALBUMIN SERPL BCP-MCNC: 3 G/DL (ref 3.2–4.9)
ALBUMIN/GLOB SERPL: 1.4 G/DL
ALP SERPL-CCNC: 66 U/L (ref 30–99)
ALT SERPL-CCNC: 12 U/L (ref 2–50)
ANION GAP SERPL CALC-SCNC: 8 MMOL/L (ref 0–11.9)
AST SERPL-CCNC: 17 U/L (ref 12–45)
BASE EXCESS BLDA CALC-SCNC: 3 MMOL/L (ref -4–3)
BASOPHILS # BLD AUTO: 0.3 % (ref 0–1.8)
BASOPHILS # BLD: 0.02 K/UL (ref 0–0.12)
BILIRUB SERPL-MCNC: 0.3 MG/DL (ref 0.1–1.5)
BODY TEMPERATURE: ABNORMAL DEGREES
BUN SERPL-MCNC: 14 MG/DL (ref 8–22)
CALCIUM SERPL-MCNC: 8.4 MG/DL (ref 8.5–10.5)
CHLORIDE SERPL-SCNC: 110 MMOL/L (ref 96–112)
CO2 BLDA-SCNC: 26 MMOL/L (ref 20–33)
CO2 SERPL-SCNC: 25 MMOL/L (ref 20–33)
CREAT SERPL-MCNC: 0.73 MG/DL (ref 0.5–1.4)
EOSINOPHIL # BLD AUTO: 0.05 K/UL (ref 0–0.51)
EOSINOPHIL NFR BLD: 0.6 % (ref 0–6.9)
ERYTHROCYTE [DISTWIDTH] IN BLOOD BY AUTOMATED COUNT: 39.5 FL (ref 35.9–50)
GLOBULIN SER CALC-MCNC: 2.2 G/DL (ref 1.9–3.5)
GLUCOSE BLD-MCNC: 158 MG/DL (ref 65–99)
GLUCOSE BLD-MCNC: 166 MG/DL (ref 65–99)
GLUCOSE BLD-MCNC: 195 MG/DL (ref 65–99)
GLUCOSE BLD-MCNC: 95 MG/DL (ref 65–99)
GLUCOSE BLD-MCNC: 97 MG/DL (ref 65–99)
GLUCOSE SERPL-MCNC: 85 MG/DL (ref 65–99)
HCO3 BLDA-SCNC: 24.8 MMOL/L (ref 17–25)
HCT VFR BLD AUTO: 39.7 % (ref 42–52)
HGB BLD-MCNC: 13.6 G/DL (ref 14–18)
IMM GRANULOCYTES # BLD AUTO: 0.02 K/UL (ref 0–0.11)
IMM GRANULOCYTES NFR BLD AUTO: 0.3 % (ref 0–0.9)
INST. QUALIFIED PATIENT IIQPT: YES
LYMPHOCYTES # BLD AUTO: 2.21 K/UL (ref 1–4.8)
LYMPHOCYTES NFR BLD: 28.7 % (ref 22–41)
MAGNESIUM SERPL-MCNC: 2 MG/DL (ref 1.5–2.5)
MCH RBC QN AUTO: 30.2 PG (ref 27–33)
MCHC RBC AUTO-ENTMCNC: 34.3 G/DL (ref 33.7–35.3)
MCV RBC AUTO: 88.2 FL (ref 81.4–97.8)
MONOCYTES # BLD AUTO: 0.45 K/UL (ref 0–0.85)
MONOCYTES NFR BLD AUTO: 5.8 % (ref 0–13.4)
NEUTROPHILS # BLD AUTO: 4.96 K/UL (ref 1.82–7.42)
NEUTROPHILS NFR BLD: 64.3 % (ref 44–72)
NRBC # BLD AUTO: 0 K/UL
NRBC BLD-RTO: 0 /100 WBC
O2/TOTAL GAS SETTING VFR VENT: 30 %
PCO2 BLDA: 29.3 MMHG (ref 26–37)
PCO2 TEMP ADJ BLDA: 28.5 MMHG (ref 26–37)
PH BLDA: 7.54 [PH] (ref 7.4–7.5)
PH TEMP ADJ BLDA: 7.55 [PH] (ref 7.4–7.5)
PHOSPHATE SERPL-MCNC: 1.2 MG/DL (ref 2.5–4.5)
PLATELET # BLD AUTO: 214 K/UL (ref 164–446)
PMV BLD AUTO: 10 FL (ref 9–12.9)
PO2 BLDA: 134 MMHG (ref 64–87)
PO2 TEMP ADJ BLDA: 130 MMHG (ref 64–87)
POTASSIUM SERPL-SCNC: 2.6 MMOL/L (ref 3.6–5.5)
PROT SERPL-MCNC: 5.2 G/DL (ref 6–8.2)
RBC # BLD AUTO: 4.5 M/UL (ref 4.7–6.1)
SAO2 % BLDA: 99 % (ref 93–99)
SODIUM SERPL-SCNC: 143 MMOL/L (ref 135–145)
SPECIMEN DRAWN FROM PATIENT: ABNORMAL
TRIGL SERPL-MCNC: 153 MG/DL (ref 0–149)
WBC # BLD AUTO: 7.7 K/UL (ref 4.8–10.8)

## 2018-06-11 PROCEDURE — 700105 HCHG RX REV CODE 258: Performed by: INTERNAL MEDICINE

## 2018-06-11 PROCEDURE — 99291 CRITICAL CARE FIRST HOUR: CPT | Mod: 25 | Performed by: INTERNAL MEDICINE

## 2018-06-11 PROCEDURE — 82803 BLOOD GASES ANY COMBINATION: CPT

## 2018-06-11 PROCEDURE — 99153 MOD SED SAME PHYS/QHP EA: CPT

## 2018-06-11 PROCEDURE — 700111 HCHG RX REV CODE 636 W/ 250 OVERRIDE (IP): Performed by: INTERNAL MEDICINE

## 2018-06-11 PROCEDURE — 700102 HCHG RX REV CODE 250 W/ 637 OVERRIDE(OP): Performed by: INTERNAL MEDICINE

## 2018-06-11 PROCEDURE — C1729 CATH, DRAINAGE: HCPCS

## 2018-06-11 PROCEDURE — 71045 X-RAY EXAM CHEST 1 VIEW: CPT

## 2018-06-11 PROCEDURE — 80053 COMPREHEN METABOLIC PANEL: CPT

## 2018-06-11 PROCEDURE — 99233 SBSQ HOSP IP/OBS HIGH 50: CPT | Performed by: HOSPITALIST

## 2018-06-11 PROCEDURE — 32551 INSERTION OF CHEST TUBE: CPT

## 2018-06-11 PROCEDURE — A9270 NON-COVERED ITEM OR SERVICE: HCPCS | Performed by: INTERNAL MEDICINE

## 2018-06-11 PROCEDURE — 82962 GLUCOSE BLOOD TEST: CPT | Mod: 91

## 2018-06-11 PROCEDURE — 700101 HCHG RX REV CODE 250: Performed by: INTERNAL MEDICINE

## 2018-06-11 PROCEDURE — 94640 AIRWAY INHALATION TREATMENT: CPT

## 2018-06-11 PROCEDURE — 85025 COMPLETE CBC W/AUTO DIFF WBC: CPT

## 2018-06-11 PROCEDURE — 700111 HCHG RX REV CODE 636 W/ 250 OVERRIDE (IP)

## 2018-06-11 PROCEDURE — 36600 WITHDRAWAL OF ARTERIAL BLOOD: CPT

## 2018-06-11 PROCEDURE — 94003 VENT MGMT INPAT SUBQ DAY: CPT

## 2018-06-11 PROCEDURE — 83735 ASSAY OF MAGNESIUM: CPT

## 2018-06-11 PROCEDURE — 770022 HCHG ROOM/CARE - ICU (200)

## 2018-06-11 PROCEDURE — 84100 ASSAY OF PHOSPHORUS: CPT

## 2018-06-11 PROCEDURE — 32554 ASPIRATE PLEURA W/O IMAGING: CPT | Performed by: INTERNAL MEDICINE

## 2018-06-11 PROCEDURE — 99152 MOD SED SAME PHYS/QHP 5/>YRS: CPT

## 2018-06-11 PROCEDURE — 84478 ASSAY OF TRIGLYCERIDES: CPT

## 2018-06-11 RX ORDER — ROCURONIUM BROMIDE 10 MG/ML
50 INJECTION, SOLUTION INTRAVENOUS ONCE
Status: COMPLETED | OUTPATIENT
Start: 2018-06-11 | End: 2018-06-11

## 2018-06-11 RX ORDER — POLYETHYLENE GLYCOL 3350 17 G/17G
1 POWDER, FOR SOLUTION ORAL
Status: DISCONTINUED | OUTPATIENT
Start: 2018-06-11 | End: 2018-06-12

## 2018-06-11 RX ORDER — OXYCODONE HYDROCHLORIDE 5 MG/1
5 TABLET ORAL EVERY 4 HOURS PRN
Status: DISCONTINUED | OUTPATIENT
Start: 2018-06-11 | End: 2018-06-11

## 2018-06-11 RX ORDER — OXYCODONE HYDROCHLORIDE 10 MG/1
10 TABLET ORAL EVERY 4 HOURS PRN
Status: DISCONTINUED | OUTPATIENT
Start: 2018-06-11 | End: 2018-06-12

## 2018-06-11 RX ORDER — OXYCODONE HYDROCHLORIDE 10 MG/1
10 TABLET ORAL EVERY 4 HOURS PRN
Status: DISCONTINUED | OUTPATIENT
Start: 2018-06-11 | End: 2018-06-11

## 2018-06-11 RX ORDER — AMOXICILLIN 250 MG
2 CAPSULE ORAL 2 TIMES DAILY
Status: DISCONTINUED | OUTPATIENT
Start: 2018-06-11 | End: 2018-06-12

## 2018-06-11 RX ORDER — MIDAZOLAM HYDROCHLORIDE 1 MG/ML
1 INJECTION INTRAMUSCULAR; INTRAVENOUS
Status: DISCONTINUED | OUTPATIENT
Start: 2018-06-11 | End: 2018-06-12

## 2018-06-11 RX ORDER — MIDAZOLAM HYDROCHLORIDE 1 MG/ML
INJECTION INTRAMUSCULAR; INTRAVENOUS
Status: DISCONTINUED
Start: 2018-06-11 | End: 2018-06-11

## 2018-06-11 RX ORDER — BISACODYL 10 MG
10 SUPPOSITORY, RECTAL RECTAL
Status: DISCONTINUED | OUTPATIENT
Start: 2018-06-11 | End: 2018-06-12

## 2018-06-11 RX ORDER — OXYCODONE HYDROCHLORIDE 5 MG/1
5 TABLET ORAL EVERY 4 HOURS PRN
Status: DISCONTINUED | OUTPATIENT
Start: 2018-06-11 | End: 2018-06-12

## 2018-06-11 RX ADMIN — IPRATROPIUM BROMIDE AND ALBUTEROL SULFATE 3 ML: .5; 3 SOLUTION RESPIRATORY (INHALATION) at 06:30

## 2018-06-11 RX ADMIN — DEXMEDETOMIDINE HYDROCHLORIDE 0.2 MCG/KG/HR: 100 INJECTION, SOLUTION INTRAVENOUS at 15:45

## 2018-06-11 RX ADMIN — PROPOFOL 80 MCG/KG/MIN: 10 INJECTION, EMULSION INTRAVENOUS at 23:00

## 2018-06-11 RX ADMIN — NOREPINEPHRINE BITARTRATE 4 MCG/MIN: 1 INJECTION INTRAVENOUS at 19:31

## 2018-06-11 RX ADMIN — FENTANYL CITRATE 50 MCG: 50 INJECTION, SOLUTION INTRAMUSCULAR; INTRAVENOUS at 11:10

## 2018-06-11 RX ADMIN — INSULIN GLARGINE 15 UNITS: 100 INJECTION, SOLUTION SUBCUTANEOUS at 21:28

## 2018-06-11 RX ADMIN — INSULIN HUMAN 3 UNITS: 100 INJECTION, SOLUTION PARENTERAL at 19:00

## 2018-06-11 RX ADMIN — ROCURONIUM BROMIDE 50 MG: 10 INJECTION, SOLUTION INTRAVENOUS at 07:24

## 2018-06-11 RX ADMIN — IPRATROPIUM BROMIDE AND ALBUTEROL SULFATE 3 ML: .5; 3 SOLUTION RESPIRATORY (INHALATION) at 18:16

## 2018-06-11 RX ADMIN — POTASSIUM PHOSPHATE, MONOBASIC AND POTASSIUM PHOSPHATE, DIBASIC 15 MMOL: 224; 236 INJECTION, SOLUTION INTRAVENOUS at 21:00

## 2018-06-11 RX ADMIN — PROPOFOL 80 MCG/KG/MIN: 10 INJECTION, EMULSION INTRAVENOUS at 07:48

## 2018-06-11 RX ADMIN — MIDAZOLAM HYDROCHLORIDE 1 MG: 1 INJECTION, SOLUTION INTRAMUSCULAR; INTRAVENOUS at 07:24

## 2018-06-11 RX ADMIN — PROPOFOL 70 MCG/KG/MIN: 10 INJECTION, EMULSION INTRAVENOUS at 04:10

## 2018-06-11 RX ADMIN — FENTANYL CITRATE 100 MCG: 50 INJECTION INTRAMUSCULAR; INTRAVENOUS at 07:44

## 2018-06-11 RX ADMIN — Medication 2 TABLET: at 21:32

## 2018-06-11 RX ADMIN — PROPOFOL 70 MCG/KG/MIN: 10 INJECTION, EMULSION INTRAVENOUS at 13:45

## 2018-06-11 RX ADMIN — IPRATROPIUM BROMIDE AND ALBUTEROL SULFATE 3 ML: .5; 3 SOLUTION RESPIRATORY (INHALATION) at 02:22

## 2018-06-11 RX ADMIN — IPRATROPIUM BROMIDE AND ALBUTEROL SULFATE 3 ML: .5; 3 SOLUTION RESPIRATORY (INHALATION) at 22:27

## 2018-06-11 RX ADMIN — PROPOFOL 60 MCG/KG/MIN: 10 INJECTION, EMULSION INTRAVENOUS at 17:01

## 2018-06-11 RX ADMIN — PROPOFOL 80 MCG/KG/MIN: 10 INJECTION, EMULSION INTRAVENOUS at 00:55

## 2018-06-11 RX ADMIN — IPRATROPIUM BROMIDE AND ALBUTEROL SULFATE 3 ML: .5; 3 SOLUTION RESPIRATORY (INHALATION) at 14:23

## 2018-06-11 RX ADMIN — PROPOFOL 80 MCG/KG/MIN: 10 INJECTION, EMULSION INTRAVENOUS at 11:01

## 2018-06-11 RX ADMIN — SODIUM CHLORIDE, POTASSIUM CHLORIDE, SODIUM LACTATE AND CALCIUM CHLORIDE: 600; 310; 30; 20 INJECTION, SOLUTION INTRAVENOUS at 11:56

## 2018-06-11 RX ADMIN — POTASSIUM PHOSPHATE, MONOBASIC AND POTASSIUM PHOSPHATE, DIBASIC 30 MMOL: 224; 236 INJECTION, SOLUTION INTRAVENOUS at 12:30

## 2018-06-11 RX ADMIN — IPRATROPIUM BROMIDE AND ALBUTEROL SULFATE 3 ML: .5; 3 SOLUTION RESPIRATORY (INHALATION) at 11:33

## 2018-06-11 RX ADMIN — FENTANYL CITRATE 100 MCG: 50 INJECTION, SOLUTION INTRAMUSCULAR; INTRAVENOUS at 07:24

## 2018-06-11 RX ADMIN — INSULIN HUMAN 3 UNITS: 100 INJECTION, SOLUTION PARENTERAL at 00:30

## 2018-06-11 NOTE — CARE PLAN
Problem: Ventilation Defect:  Goal: Ability to achieve and maintain unassisted ventilation or tolerate decreased levels of ventilator support    Intervention: Support and monitor invasive and noninvasive mechanical ventilation  Adult Ventilation Update    Total Vent Days: 3    Patient Lines/Drains/Airways Status    Active Airway     Name: Placement date: Placement time: Site: Days:    Airway Group ET Tube Oral 8.0 06/09/18      Oral   3              In the last 24 hours, the patient tolerated SBT for  1 hour and returned to rest settings per protocol (06/10/18 1729)    Cough: Productive (06/11/18 0400)  Sputum Amount: Small (06/11/18 0400)  Sputum Color: Clear (06/11/18 0400)  Sputum Consistency: Thick (06/11/18 0400)    Mobility  Level of Mobility: Level IV (06/10/18 2000)  Activity Performed: Unable to mobilize (06/10/18 2000)  Reason Not Mobilized: Bed rest (06/10/18 2000)

## 2018-06-11 NOTE — PROGRESS NOTES
[facility] Hospitalist Progress Note    Date of Service: 2018    Chief Complaint  32 y.o. male admitted 2018 with DKA, UGIB and acute resp failure.  Presented to an outlying facility.  Had apparently not been taking his insulin.  Intubated and transferred to us. PNMTX and CT     PMHx: TIDM, tobacco abuse, chronic back pain, homeless    Interval Problem Update  OG to suction some blood  PNMTX on CXR this am and CT placed  Sinus tach  38.2  Prop 80  sinus  LR 100ml/hr  UOP adequate  AFebrile    Consultants/Specialty  Pulmonology    Disposition  cont's to be critically ill on vent in ICU        Review of Systems   Unable to perform ROS: Intubated      Physical Exam  Laboratory/Imaging   Hemodynamics  No data recorded.   Monitored Temp: 36.6 °C (97.9 °F)  Pulse  Av.2  Min: 53  Max: 129 Heart Rate (Monitored): 100  NIBP: 110/67      Respiratory  Frey Vent Mode: APVCMV, Rate (breaths/min): 20, PEEP/CPAP: 8, PEEP/CPAP: 8, FiO2: 30, P Peak (PIP): 17, P MEAN: 11   Respiration: 20, Pulse Oximetry: 100 %     Work Of Breathing / Effort: Vented  RUL Breath Sounds: Clear;Diminished, RML Breath Sounds: Diminished;Clear, RLL Breath Sounds: Clear;Diminished, LUIS ALBERTO Breath Sounds: Clear;Diminished, LLL Breath Sounds: Clear;Diminished    Fluids    Intake/Output Summary (Last 24 hours) at 18 0552  Last data filed at 18 0400   Gross per 24 hour   Intake           2609.3 ml   Output             1490 ml   Net           1119.3 ml       Nutrition  Orders Placed This Encounter   Procedures   • Diet NPO     Standing Status:   Standing     Number of Occurrences:   1     Order Specific Question:   Type:     Answer:   Now [1]     Order Specific Question:   Restrict to:     Answer:   Strict [1]     Physical Exam   Constitutional: He appears well-developed and well-nourished. No distress.   HENT:   Head: Normocephalic and atraumatic.   Eyes: Conjunctivae are normal.   Neck: No JVD present.   Cardiovascular: Normal rate.   Exam reveals no gallop.    No murmur heard.  Pulmonary/Chest: No stridor. No respiratory distress. He has no wheezes. He has no rales.   Abdominal: Soft. There is no tenderness. There is no rebound and no guarding.   Musculoskeletal: He exhibits no edema.   Neurological: He is alert.   Skin: Skin is warm and dry. No rash noted. He is not diaphoretic.   Nursing note and vitals reviewed.      Recent Labs      06/09/18   2044  06/10/18   0527  06/10/18   1340  06/10/18   2205   WBC  16.8*  9.7   --    --    RBC  5.00  4.83   --    --    HEMOGLOBIN  15.2  14.9  14.8  14.5   HEMATOCRIT  44.9  42.1   --    --    MCV  89.8  87.2   --    --    MCH  30.4  30.8   --    --    MCHC  33.9  35.4*   --    --    RDW  38.1  37.6   --    --    PLATELETCT  299  279   --    --    MPV  9.9  10.0   --    --      Recent Labs      06/09/18   2044  06/10/18   0154   SODIUM  137  140   POTASSIUM  4.1  3.8   CHLORIDE  111  113*   CO2  13*  20   GLUCOSE  237*  174*   BUN  25*  23*   CREATININE  1.11  1.02   CALCIUM  7.7*  8.2*             Recent Labs      06/09/18   2044   TRIGLYCERIDE  192*          Assessment/Plan     * DKA (diabetic ketoacidoses) (HCC)   Assessment & Plan    AG and CO2 normalized  Cont to follow closely  Start lantus and SSI        Hematemesis   Assessment & Plan    Probable MW tear given presentation and no significant drop in Hgb  Cont PPI  Follow serial H&H  Low threshold to consult GI  Dc octreotide        Postprocedural pneumothorax   Assessment & Plan    CT placed 6/11        Acute respiratory failure (HCC)   Assessment & Plan    Intubated 6/10  Pulmonology following        Leukocytosis- (present on admission)   Assessment & Plan    Suspect that this is reactive, he is afebrile with stable vital signs although he does continue to be tachycardic in the setting of his DKA. Chest x-ray is negative for evidence of infection, I will check a UA for completeness prime holding off on any antibiotic therapy at this time.           Quality-Core Measures   Reviewed items::  Radiology images reviewed, EKG reviewed, Labs reviewed and Medications reviewed

## 2018-06-11 NOTE — CARE PLAN
Problem: Ventilation Defect:  Goal: Ability to achieve and maintain unassisted ventilation or tolerate decreased levels of ventilator support    Intervention: Support and monitor invasive and noninvasive mechanical ventilation  Adult Ventilation Update    Total Vent Days: 3    Patient Lines/Drains/Airways Status    Active Airway     Name: Placement date: Placement time: Site: Days:    Airway Group ET Tube Oral 8.0 @ 23 06/09/18      Oral   3              18 420 +8 30%    Events/Summary/Plan: RR decreased to 18 from 20. Today's SBT's held due to new pneumothorax- resume SBTs in AM per Dr. Solorzano. No other changes made this shift, will continue to monitor.

## 2018-06-11 NOTE — PROCEDURES
CHEST TUBE PROCEDURE NOTE    Date: 6/11/2018    Time: 0742    Procedure: Chest tube placement, For right sided pneumothorax on positive pressure ventilation    Site: Right 4-5 mid axillary line chest     Pre-operative diagnosis: Pneumothorax on PPV, likely related to attempt at subclavian catheter    Post-operative diagnosis: Same with resolution of pneumothorax with air from the insertion site    Consent: Emergency, implied consent     Procedure: Patient positioned, prepped, and draped in as sterile fashion. After placing a 14g angiocath into the mid-clavicular line 2nd rib space (above the rib) on the right side, observed air escape and good saturations, a 2.5  cm incision with an 15# blade scalpel made in the  mid-axillary line in the 4-5th intercostal space. Incision widened with hemostats using blunt dissection and pleural space entered with tool directly over rib #5. A 28 F chest tube placed into the pleural space and connected to pleurovac with continuous wall suction at -20cm H2O. Air and minor amount of blood returned into the pleurovac and patient stable. Chest tube secured in place with 0-0 silk suture and dressed with vasoline gauze and occlusive tape.    Medications:   Profofol infusion, 100 mcg Fentanyl, 2 mg versed, and 50 mg Rocuronium    EBL: Minimal    Complications: None immediate    CXR: STAT PCXR pending..    Jose Luis Solorzano D.O.   Critical Care Medicine

## 2018-06-11 NOTE — DIETARY
Nutrition Support Assessment     Nutrition services:   Day 2 of admit.  33 yo male with admitting diagnosis: respiratory failure    Current problem list:  1. Respiratory distress - on vent  2. DKA  3. Hematemesis  4. Acute renal failure    Assessment:  Estimated Nutritional Needs: based on: height 5'10, weight 63.8 kg, IBW 69.8 kg, BMI 20.18    Calculation/Equation: MSJ x 1.2 = 1920 kcals  Calories/day: 1900 - 2100 kcals (30 - 33 kcals/kg)  Protein/day: 77 - 89 g (1.2 - 1.4 g/kg)    Evaluation:   1. Pt on vent in need of nutrition support  2. cortrak to be placed for enteral access    3. History of diabetes - will provide low CHO high protein TF formula     Recommendations/Plan:  1. Start and advance TF per protocol   2. Diabetisource goal 70 ml/hr will provide 2016 kcals, 101 g protein, 1364 ml lh20/day

## 2018-06-11 NOTE — PROGRESS NOTES
Pulmonary Critical Care Progress Note        Chief Complaint: Acute hypoxic respiratory failure, DKA, and hematemesis.    History of Present Illness: This is a 32-year-old male, who was transferred   from an outside facility.  All information taken from chart review as well as   sign-out as patient presently on full mechanical ventilatory support.  It   appears that he has a longstanding history of insulin-dependent diabetes,   chronic tobacco use, back pain, and homelessness.  The best I can gather from   the chart is that he has not taken his insulin in approximately 8 days.  He   presented to outside facility with nausea, vomiting, and bloody vomitus.  The   patient was given subQ as well as IV insulin, started on IV fluids.  After   noted blood in his emesis, started on Protonix as well as octreotide and at   some point had developed respiratory failure requiring intubation and   subsequently transferred to [facility] for higher level of care.   At the present   time, he is on full mechanical ventilatory support, sedated with propofol.    Hemodynamics appear intact, not requiring any pressor therapy.  Upon initial   arrival, he did have one single episode of emesis, which was more brown,   cloudy in color, no obvious coffee-ground or bright red appearance.  No   further information at the present time currently available.    Please note above history obtained by my partner  on 6/9/2018.     ROS:  Respiratory: negative, Cardiac: negative, GI: negative.  All other systems negative.    Interval Events:  24 hour interval history reviewed    Meth, benzo, cocaine and THC   Chest tube today for pneumothorax right side from subclavian from [place], placed by myself after attempt at placing a subclavian catheter most likely..  PERRLA  Fentanyl not as helpful for pain, so will start oral therapy.   Good pulses  Vent day 3, increase PEEP To 8 and drop to 18 RR  Duo q 4 hours  GI finger sticks  Type I DM  NG to clamped    PPI bid, probably brie barrera tear, and allowed to eat.   CBC q12  Trickle feeds with cortrack  LR at 100 cc/hour  Drop to 50 cc/ hour IVF.   Hypok and hypophos  Oxy to add.     PFSH:  No change.    Respiratory:  Frey Vent Mode: Spont, Rate (breaths/min): 20, Vt Target (mL): 420, PEEP/CPAP: 8, FiO2: 30, P Support: 5, Static Compliance (ml / cm H2O): 131, Control VTE (exp VT): 425  Pulse Oximetry: 100 %  Chest Tube Drains:          Exam: rhonchi right > left, with some percussible air of the right hemithorax. There is subcutaneous air over the right upper lower neck. The site from subclavian catheter on the right had minor subcutaneous air as well. This was all prior to placing the chest tube this morning.  Imaging: My personal review of this morning's chest x-ray does reveal a right-sided pneumothorax with a deep sulcus sign on the right and probable 2.3 cm degree of air on the right side. There is also apical pneumothorax seen as well.    After chest x-ray placement, I believe the lung is reexpanded and chest tubes in good position. There might be a slight bit of air in the mediastinum, as this runs along the right heart border.. ET tube is in good position. There is a central catheter in good position. This is my interpretation.  Recent Labs      06/09/18   2040  06/10/18   0535  06/11/18   0459   ISTATAPH  7.199*  7.385*  7.536*   ISTATAPCO2  27.9  31.3  29.3   ISTATAPO2  175*  98*  134*   ISTATATCO2  12*  20  26   [id number]  99  98  99   ISTATARTHCO3  10.9*  18.7  24.8   ISTATARTBE  -16*  -5*  3   ISTATTEMP  see below  99.9 F  97.5 F   ISTATFIO2  40  30  30   ISTATSPEC  Arterial  Arterial  Arterial   ISTATAPHTC   --   7.375*  7.546*   [id number]   --   102*  130*   Respiratory rate was decreased based on the arterial blood gas above.    HemoDynamics:  Pulse: 96, Heart Rate (Monitored): 93  NIBP: 138/77       Exam: regular rate and rhythm no obvious murmur, rub, gallop. Right IJ central catheter in  good position. There is a new right-sided chest tube placed.  Imaging: None - Reviewed        Neuro:  GCS Total Concord Coma Score: 10       Exam: no focal deficits noted, however agitated and does require high-dose sedation. Both fentanyl and propofol at the knees today.   Imaging: None - Reviewed    Fluids:  Intake/Output       06/09/18 0700 - 06/10/18 0659 06/10/18 0700 - 06/11/18 0659 06/11/18 0700 - 06/12/18 0659      0700-1859 1900-0659 Total 0700-1859 1900-0659 Total 7624-9405 2686-3982 Total       Intake    I.V.  --  242.8 242.8  1557.8  1647.3 3205.1  --  -- --    Octreotide Volume -- 66.3 66.3 20 -- 20 -- -- --    Propofol Volume -- 176.5 176.5 37.8 447.3 485.1 -- -- --    IV Volume (LR) -- -- -- 1500 1200 2700 -- -- --    Total Intake -- 242.8 242.8 1557.8 1647.3 3205.1 -- -- --       Output    Urine  --  512 512  950  660 1610  --  -- --    Output (mL) (Urinary Catheter Indwelling Catheter 16 fr) -- 512 512  -- -- --    Stool  --  -- --  --  -- --  --  -- --    Number of Times Stooled -- -- -- 1 x -- 1 x -- -- --    Emesis/NG output  --  250 250  --  400 400  --  -- --    Output (mL) (Enteral Tube Right Nare Nasogastric) -- 250 250 -- 400 400 -- -- --    Total Output -- 762  2010 -- -- --       Net I/O     -- -519.2 -519.2 607.8 587.3 1195.1 -- -- --        Weight: 63.8 kg (140 lb 10.5 oz)  Recent Labs      06/09/18   2044  06/10/18   0154  06/10/18   0527   SODIUM  137  140   --    POTASSIUM  4.1  3.8   --    CHLORIDE  111  113*   --    CO2  13*  20   --    BUN  25*  23*   --    CREATININE  1.11  1.02   --    MAGNESIUM   --    --   2.0   PHOSPHORUS   --    --   1.5*   CALCIUM  7.7*  8.2*   --        GI/Nutrition:  Exam: abdomen is soft and non-tender   Imaging: abdominal plain film Reviewed,  KUB does show signs of constipation but otherwise was negative for obstruction. Fecal matter was noted in the distal colon.  tube feed At goal  Liver Function  Recent Labs      06/09/18    2044  06/10/18   0154  06/10/18   0527   ALTSGPT  19   --    --    ASTSGOT  23   --    --    ALKPHOSPHAT  89   --    --    TBILIRUBIN  0.5   --    --    LIPASE  301*   --   54   GLUCOSE  237*  174*   --        Heme:  Recent Labs      06/09/18   2044  06/10/18   0527  06/10/18   1340  06/10/18   2205  18   0545   RBC  5.00  4.83   --    --   4.50*   HEMOGLOBIN  15.2  14.9  14.8  14.5  13.6*   HEMATOCRIT  44.9  42.1   --    --   39.7*   PLATELETCT  299  279   --    --   214       Infectious Disease:  Monitored Temp 2  Av.1 °C (98.8 °F)  Min: 36.1 °C (97 °F)  Max: 37.7 °C (99.9 °F)  Micro: no cultures pending, except sputum  Recent Labs      06/09/18   2044  06/10/18   0527  06/11/18   0545   WBC  16.8*  9.7  7.7   NEUTSPOLYS  85.60*  80.90*  64.30   LYMPHOCYTES  6.40*  10.40*  28.70   MONOCYTES  7.00  8.10  5.80   EOSINOPHILS  0.00  0.00  0.60   BASOPHILS  0.20  0.10  0.30   ASTSGOT  23   --    --    ALTSGPT  19   --    --    ALKPHOSPHAT  89   --    --    TBILIRUBIN  0.5   --    --      Current Facility-Administered Medications   Medication Dose Frequency Provider Last Rate Last Dose   • omeprazole 2 mg/mL in sodium bicarbonate (PRILOSEC) oral susp 40 mg  40 mg DAILY Pratik Lion Jr., D.O.   40 mg at 06/10/18 1018   • propofol (DIPRIVAN) injection  0-80 mcg/kg/min Continuous Chris Fuentes M.D. 15.1 mL/hr at 18 40 mcg/kg/min at 18   • Respiratory Care per Protocol   Continuous RT Chris Fuentes M.D.       • senna-docusate (PERICOLACE or SENOKOT S) 8.6-50 MG per tablet 2 Tab  2 Tab BID Chris Fuentes M.D.   Stopped at 06/10/18 2054    And   • polyethylene glycol/lytes (MIRALAX) PACKET 1 Packet  1 Packet QDAY KEYONA Fuentes M.D.        And   • magnesium hydroxide (MILK OF MAGNESIA) suspension 30 mL  30 mL QDAY PRSANCHO Fuentes M.D.        And   • bisacodyl (DULCOLAX) suppository 10 mg  10 mg QDAY KEYONA Fuentes M.D.       • MD ALERT...Adult ICU Electrolyte  Replacement per Pharmacy Protocol   pharmacy to dose Chris Fuentes M.D.       • fentaNYL (SUBLIMAZE) injection 25 mcg  25 mcg Q HOUR PRN Chris Fuentes M.D.        Or   • fentaNYL (SUBLIMAZE) injection 50 mcg  50 mcg Q HOUR PRN Chris Fuentes M.D.   50 mcg at 06/10/18 1334    Or   • fentaNYL (SUBLIMAZE) injection 100 mcg  100 mcg Q HOUR PRN Chris Fuentes M.D.   100 mcg at 06/10/18 2350   • ipratropium-albuterol (DUONEB) nebulizer solution  3 mL Q2HRS PRN (RT) Chris Fuentes M.D.       • ipratropium-albuterol (DUONEB) nebulizer solution  3 mL Q4HRS (RT) Chris Fuentes M.D.   3 mL at 06/11/18 0630   • lactated ringers infusion   Continuous Chris Fuentes M.D. 100 mL/hr at 06/10/18 1510     • insulin glargine (LANTUS) injection 15 Units  15 Units Q EVENING Chris Fuentes M.D.   15 Units at 06/10/18 2054   • insulin regular (HUMULIN R) injection 3-14 Units  3-14 Units Q6HRS Chris Fuentes M.D.   Stopped at 06/11/18 0700    And   • glucose 4 g chewable tablet 16 g  16 g Q15 MIN PRN Chris Fuentes M.D.        And   • dextrose 50% (D50W) injection 25 mL  25 mL Q15 MIN PRN Chris Fuentes M.D.         Last reviewed on 6/10/2018  3:25 AM by Fer Epps R.N.    Quality  Measures:  Labs reviewed, Medications reviewed and Radiology images reviewed  Conner catheter: Critically Ill - Requiring Accurate Measurement of Urinary Output  Central line in place: Need for access and Concentrated IV drugs    DVT Prophylaxis: Contraindicated - High bleeding risk  DVT prophylaxis - mechanical: SCDs  Ulcer prophylaxis: Yes      Assessment and plan:     1. Acute hypoxemic respiratory failure.    -Patient was intubated initially for airway protection at [facility]. This was on 6/9/2018.  -Patient did require chest tube placement as Central line placement resulted in pneumothorax most likely  -I perform this on 6/11/2018 based on chest x-ray.  28 [place] chest tube in position.  -Continue to follow for signs of  infection  -Sputum culture to be ordered.  -Hold on spontaneous breathing trial until we have resolved pneumothorax/pneumomediastinum.  -Possible CT of the chest may be indicated.    2. Diabetic ketoacidosis at presentation    -Patient has been transitioned to Lantus and sliding scale for now.  -Off insulin infusion.  -Follow typical sliding scale as well.    3. Hematemesis.    -Patient initially on octreotide as well as Protonix infusion  -At this point he shows signs of constipation on KUB today  -Will observe closely for possible need for GI evaluation  -Continue with omeprazole for now.  -Off infusions up Protonix and octreotide.    4. Acute kidney injury.    -Patient initially had oliguria and was extremely dehydrated.  -This is likely cause of slight worsening renal insufficiency as well as diabetic ketoacidosis  -Continue to follow BUN and creatinine as well as potassium closely.    5. Homelessness with incomplete database.    Discussed patient condition and risk of morbidity and/or mortality with multidisciplinary rounds  The patient remains critically ill.  Critical care time = 45 minutes in directly providing and coordinating critical care and extensive data review.  No time overlap and excludes procedures.    Jose Luis Solorzano D.O.

## 2018-06-11 NOTE — ASSESSMENT & PLAN NOTE
s/p CT placed 6/11  Chest tube is removed on 6/13/18  Pulmonary input is noted  cxray showed:1.  Atelectasis within the left lung base.    2.  Again seen subcutaneous emphysema involving the neck and upper chest bilaterally.

## 2018-06-11 NOTE — PROGRESS NOTES
"  Cortrak Placement    Tube Team verified patient name and medical record number prior to tube placement.  Cortrak tube (55\", 10 F) placed at 80 cm in left nare.  Per Cortrak picture, tube appears to be in the stomach.  Nursing Instructions: Awaiting KUB to confirm placement before use for medications or feeding. Once placement confirmed, flush tube with 30 ml of water, and then remove and save stylet, in patient medication drawer.            "

## 2018-06-11 NOTE — CARE PLAN
Problem: Infection  Goal: Will remain free from infection  Outcome: PROGRESSING AS EXPECTED  CAUTI & CLABSI infection prevention control bundles in place per orders    Problem: Venous Thromboembolism (VTW)/Deep Vein Thrombosis (DVT) Prevention:  Goal: Patient will participate in Venous Thrombosis (VTE)/Deep Vein Thrombosis (DVT)Prevention Measures  Outcome: PROGRESSING AS EXPECTED  SCD's on and inflating for VTE

## 2018-06-12 ENCOUNTER — APPOINTMENT (OUTPATIENT)
Dept: RADIOLOGY | Facility: MEDICAL CENTER | Age: 33
DRG: 208 | End: 2018-06-12
Attending: INTERNAL MEDICINE
Payer: OTHER GOVERNMENT

## 2018-06-12 PROBLEM — E87.8 ELECTROLYTE ABNORMALITY: Status: ACTIVE | Noted: 2018-06-12

## 2018-06-12 LAB
ACTION RANGE TRIGGERED IACRT: NO
ALBUMIN SERPL BCP-MCNC: 2.3 G/DL (ref 3.2–4.9)
ALBUMIN/GLOB SERPL: 1 G/DL
ALP SERPL-CCNC: 52 U/L (ref 30–99)
ALT SERPL-CCNC: 10 U/L (ref 2–50)
ANION GAP SERPL CALC-SCNC: 12 MMOL/L (ref 0–11.9)
AST SERPL-CCNC: 14 U/L (ref 12–45)
BASE EXCESS BLDA CALC-SCNC: 2 MMOL/L (ref -4–3)
BASOPHILS # BLD AUTO: 0.5 % (ref 0–1.8)
BASOPHILS # BLD: 0.03 K/UL (ref 0–0.12)
BILIRUB SERPL-MCNC: 0.3 MG/DL (ref 0.1–1.5)
BODY TEMPERATURE: ABNORMAL DEGREES
BUN SERPL-MCNC: 13 MG/DL (ref 8–22)
CALCIUM SERPL-MCNC: 7.4 MG/DL (ref 8.5–10.5)
CHLORIDE SERPL-SCNC: 112 MMOL/L (ref 96–112)
CO2 BLDA-SCNC: 26 MMOL/L (ref 20–33)
CO2 SERPL-SCNC: 20 MMOL/L (ref 20–33)
CREAT SERPL-MCNC: 0.53 MG/DL (ref 0.5–1.4)
EOSINOPHIL # BLD AUTO: 0.1 K/UL (ref 0–0.51)
EOSINOPHIL NFR BLD: 1.8 % (ref 0–6.9)
ERYTHROCYTE [DISTWIDTH] IN BLOOD BY AUTOMATED COUNT: 40.4 FL (ref 35.9–50)
GLOBULIN SER CALC-MCNC: 2.3 G/DL (ref 1.9–3.5)
GLUCOSE BLD-MCNC: 125 MG/DL (ref 65–99)
GLUCOSE BLD-MCNC: 129 MG/DL (ref 65–99)
GLUCOSE BLD-MCNC: 207 MG/DL (ref 65–99)
GLUCOSE BLD-MCNC: 273 MG/DL (ref 65–99)
GLUCOSE BLD-MCNC: 299 MG/DL (ref 65–99)
GLUCOSE SERPL-MCNC: 164 MG/DL (ref 65–99)
HCO3 BLDA-SCNC: 24.8 MMOL/L (ref 17–25)
HCT VFR BLD AUTO: 33.2 % (ref 42–52)
HGB BLD-MCNC: 11.3 G/DL (ref 14–18)
IMM GRANULOCYTES # BLD AUTO: 0.02 K/UL (ref 0–0.11)
IMM GRANULOCYTES NFR BLD AUTO: 0.4 % (ref 0–0.9)
INST. QUALIFIED PATIENT IIQPT: YES
LIPASE SERPL-CCNC: 3 U/L (ref 11–82)
LYMPHOCYTES # BLD AUTO: 1.62 K/UL (ref 1–4.8)
LYMPHOCYTES NFR BLD: 29.6 % (ref 22–41)
MAGNESIUM SERPL-MCNC: 1.8 MG/DL (ref 1.5–2.5)
MCH RBC QN AUTO: 30.4 PG (ref 27–33)
MCHC RBC AUTO-ENTMCNC: 34 G/DL (ref 33.7–35.3)
MCV RBC AUTO: 89.2 FL (ref 81.4–97.8)
MONOCYTES # BLD AUTO: 0.28 K/UL (ref 0–0.85)
MONOCYTES NFR BLD AUTO: 5.1 % (ref 0–13.4)
NEUTROPHILS # BLD AUTO: 3.43 K/UL (ref 1.82–7.42)
NEUTROPHILS NFR BLD: 62.6 % (ref 44–72)
NRBC # BLD AUTO: 0 K/UL
NRBC BLD-RTO: 0 /100 WBC
O2/TOTAL GAS SETTING VFR VENT: 30 %
PCO2 BLDA: 30.8 MMHG (ref 26–37)
PCO2 TEMP ADJ BLDA: 30.3 MMHG (ref 26–37)
PH BLDA: 7.51 [PH] (ref 7.4–7.5)
PH TEMP ADJ BLDA: 7.52 [PH] (ref 7.4–7.5)
PHOSPHATE SERPL-MCNC: 2.9 MG/DL (ref 2.5–4.5)
PLATELET # BLD AUTO: 173 K/UL (ref 164–446)
PMV BLD AUTO: 10.1 FL (ref 9–12.9)
PO2 BLDA: 122 MMHG (ref 64–87)
PO2 TEMP ADJ BLDA: 120 MMHG (ref 64–87)
POTASSIUM SERPL-SCNC: 2.9 MMOL/L (ref 3.6–5.5)
PROCALCITONIN SERPL-MCNC: 0.18 NG/ML
PROT SERPL-MCNC: 4.6 G/DL (ref 6–8.2)
RBC # BLD AUTO: 3.72 M/UL (ref 4.7–6.1)
SAO2 % BLDA: 99 % (ref 93–99)
SODIUM SERPL-SCNC: 144 MMOL/L (ref 135–145)
SPECIMEN DRAWN FROM PATIENT: ABNORMAL
WBC # BLD AUTO: 5.5 K/UL (ref 4.8–10.8)

## 2018-06-12 PROCEDURE — 83690 ASSAY OF LIPASE: CPT

## 2018-06-12 PROCEDURE — 80053 COMPREHEN METABOLIC PANEL: CPT

## 2018-06-12 PROCEDURE — 71045 X-RAY EXAM CHEST 1 VIEW: CPT

## 2018-06-12 PROCEDURE — 84145 PROCALCITONIN (PCT): CPT

## 2018-06-12 PROCEDURE — 94003 VENT MGMT INPAT SUBQ DAY: CPT

## 2018-06-12 PROCEDURE — 700105 HCHG RX REV CODE 258: Performed by: INTERNAL MEDICINE

## 2018-06-12 PROCEDURE — 84100 ASSAY OF PHOSPHORUS: CPT

## 2018-06-12 PROCEDURE — 83735 ASSAY OF MAGNESIUM: CPT

## 2018-06-12 PROCEDURE — 302183 CHEST TUBE CLAMP: Performed by: INTERNAL MEDICINE

## 2018-06-12 PROCEDURE — 700102 HCHG RX REV CODE 250 W/ 637 OVERRIDE(OP): Performed by: INTERNAL MEDICINE

## 2018-06-12 PROCEDURE — 99291 CRITICAL CARE FIRST HOUR: CPT | Performed by: INTERNAL MEDICINE

## 2018-06-12 PROCEDURE — 700111 HCHG RX REV CODE 636 W/ 250 OVERRIDE (IP): Performed by: HOSPITALIST

## 2018-06-12 PROCEDURE — 700111 HCHG RX REV CODE 636 W/ 250 OVERRIDE (IP): Performed by: INTERNAL MEDICINE

## 2018-06-12 PROCEDURE — 700101 HCHG RX REV CODE 250: Performed by: INTERNAL MEDICINE

## 2018-06-12 PROCEDURE — 85025 COMPLETE CBC W/AUTO DIFF WBC: CPT

## 2018-06-12 PROCEDURE — 82962 GLUCOSE BLOOD TEST: CPT | Mod: 91

## 2018-06-12 PROCEDURE — 99233 SBSQ HOSP IP/OBS HIGH 50: CPT | Performed by: HOSPITALIST

## 2018-06-12 PROCEDURE — 82803 BLOOD GASES ANY COMBINATION: CPT

## 2018-06-12 PROCEDURE — 770022 HCHG ROOM/CARE - ICU (200)

## 2018-06-12 PROCEDURE — 36600 WITHDRAWAL OF ARTERIAL BLOOD: CPT

## 2018-06-12 PROCEDURE — 94150 VITAL CAPACITY TEST: CPT

## 2018-06-12 PROCEDURE — 94640 AIRWAY INHALATION TREATMENT: CPT

## 2018-06-12 PROCEDURE — A9270 NON-COVERED ITEM OR SERVICE: HCPCS | Performed by: INTERNAL MEDICINE

## 2018-06-12 RX ORDER — OXYCODONE HYDROCHLORIDE 10 MG/1
10 TABLET ORAL EVERY 4 HOURS PRN
Status: DISCONTINUED | OUTPATIENT
Start: 2018-06-12 | End: 2018-06-15 | Stop reason: HOSPADM

## 2018-06-12 RX ORDER — OXYCODONE HYDROCHLORIDE 5 MG/1
5 TABLET ORAL EVERY 4 HOURS PRN
Status: DISCONTINUED | OUTPATIENT
Start: 2018-06-12 | End: 2018-06-15 | Stop reason: HOSPADM

## 2018-06-12 RX ORDER — POLYETHYLENE GLYCOL 3350 17 G/17G
1 POWDER, FOR SOLUTION ORAL
Status: DISCONTINUED | OUTPATIENT
Start: 2018-06-12 | End: 2018-06-15 | Stop reason: HOSPADM

## 2018-06-12 RX ORDER — MAGNESIUM SULFATE HEPTAHYDRATE 40 MG/ML
2 INJECTION, SOLUTION INTRAVENOUS ONCE
Status: DISCONTINUED | OUTPATIENT
Start: 2018-06-12 | End: 2018-06-12

## 2018-06-12 RX ORDER — DEXTROSE MONOHYDRATE 25 G/50ML
25 INJECTION, SOLUTION INTRAVENOUS
Status: DISCONTINUED | OUTPATIENT
Start: 2018-06-12 | End: 2018-06-12

## 2018-06-12 RX ORDER — POTASSIUM CHLORIDE 29.8 MG/ML
40 INJECTION INTRAVENOUS ONCE
Status: COMPLETED | OUTPATIENT
Start: 2018-06-12 | End: 2018-06-12

## 2018-06-12 RX ORDER — DEXTROSE MONOHYDRATE 25 G/50ML
25 INJECTION, SOLUTION INTRAVENOUS
Status: DISCONTINUED | OUTPATIENT
Start: 2018-06-12 | End: 2018-06-15 | Stop reason: HOSPADM

## 2018-06-12 RX ORDER — BISACODYL 10 MG
10 SUPPOSITORY, RECTAL RECTAL
Status: DISCONTINUED | OUTPATIENT
Start: 2018-06-12 | End: 2018-06-15 | Stop reason: HOSPADM

## 2018-06-12 RX ORDER — POTASSIUM CHLORIDE 14.9 MG/ML
20 INJECTION INTRAVENOUS ONCE
Status: COMPLETED | OUTPATIENT
Start: 2018-06-12 | End: 2018-06-12

## 2018-06-12 RX ORDER — AMOXICILLIN 250 MG
2 CAPSULE ORAL 2 TIMES DAILY
Status: DISCONTINUED | OUTPATIENT
Start: 2018-06-12 | End: 2018-06-15 | Stop reason: HOSPADM

## 2018-06-12 RX ADMIN — ENOXAPARIN SODIUM 40 MG: 100 INJECTION SUBCUTANEOUS at 11:12

## 2018-06-12 RX ADMIN — IPRATROPIUM BROMIDE AND ALBUTEROL SULFATE 3 ML: .5; 3 SOLUTION RESPIRATORY (INHALATION) at 02:24

## 2018-06-12 RX ADMIN — FAMOTIDINE 20 MG: 10 INJECTION INTRAVENOUS at 11:12

## 2018-06-12 RX ADMIN — FENTANYL CITRATE 2500 MCG: 50 INJECTION, SOLUTION INTRAMUSCULAR; INTRAVENOUS at 04:24

## 2018-06-12 RX ADMIN — INSULIN HUMAN 7 UNITS: 100 INJECTION, SOLUTION PARENTERAL at 20:53

## 2018-06-12 RX ADMIN — INSULIN HUMAN 7 UNITS: 100 INJECTION, SOLUTION PARENTERAL at 18:03

## 2018-06-12 RX ADMIN — INSULIN HUMAN 4 UNITS: 100 INJECTION, SOLUTION PARENTERAL at 01:08

## 2018-06-12 RX ADMIN — MAGNESIUM SULFATE HEPTAHYDRATE 2 G: 500 INJECTION, SOLUTION INTRAMUSCULAR; INTRAVENOUS at 08:08

## 2018-06-12 RX ADMIN — FAMOTIDINE 20 MG: 10 INJECTION INTRAVENOUS at 20:35

## 2018-06-12 RX ADMIN — POTASSIUM CHLORIDE 20 MEQ: 200 INJECTION, SOLUTION INTRAVENOUS at 12:29

## 2018-06-12 RX ADMIN — PROPOFOL 70 MCG/KG/MIN: 10 INJECTION, EMULSION INTRAVENOUS at 02:00

## 2018-06-12 RX ADMIN — PROPOFOL 60 MCG/KG/MIN: 10 INJECTION, EMULSION INTRAVENOUS at 05:22

## 2018-06-12 RX ADMIN — POTASSIUM CHLORIDE 40 MEQ: 400 INJECTION, SOLUTION INTRAVENOUS at 07:47

## 2018-06-12 RX ADMIN — INSULIN GLARGINE 15 UNITS: 100 INJECTION, SOLUTION SUBCUTANEOUS at 20:35

## 2018-06-12 RX ADMIN — DEXMEDETOMIDINE HYDROCHLORIDE 0.8 MCG/KG/HR: 100 INJECTION, SOLUTION INTRAVENOUS at 00:55

## 2018-06-12 RX ADMIN — SODIUM CHLORIDE, POTASSIUM CHLORIDE, SODIUM LACTATE AND CALCIUM CHLORIDE: 600; 310; 30; 20 INJECTION, SOLUTION INTRAVENOUS at 00:55

## 2018-06-12 RX ADMIN — IPRATROPIUM BROMIDE AND ALBUTEROL SULFATE 3 ML: .5; 3 SOLUTION RESPIRATORY (INHALATION) at 06:21

## 2018-06-12 ASSESSMENT — PAIN SCALES - GENERAL
PAINLEVEL_OUTOF10: 0

## 2018-06-12 ASSESSMENT — COPD QUESTIONNAIRES
HAVE YOU SMOKED AT LEAST 100 CIGARETTES IN YOUR ENTIRE LIFE: YES
DURING THE PAST 4 WEEKS HOW MUCH DID YOU FEEL SHORT OF BREATH: NONE/LITTLE OF THE TIME
DO YOU EVER COUGH UP ANY MUCUS OR PHLEGM?: NO/ONLY WITH OCCASIONAL COLDS OR INFECTIONS
COPD SCREENING SCORE: 2

## 2018-06-12 ASSESSMENT — LIFESTYLE VARIABLES: EVER_SMOKED: YES

## 2018-06-12 ASSESSMENT — PULMONARY FUNCTION TESTS: FVC: 2.4

## 2018-06-12 NOTE — PROGRESS NOTES
"1450:  Patient requested to have his backpack to get his cell phone.  Patient took out his cell phone and his \"battery pack.\"       1530:  RN at bedside to assist patient to chair at bedside.  RN sat patient at the edge of the bed.  RN found a lighter, foil papers, small red circular container and \"pill box.\"  RN set patient belongings on the bedside table and called security, unit supervisor and unit manager.  Patient standing, threatening to leave the hospital.  Unit manager met with patient and discussed all options.  Patient ultimately allowed security to take his belongings to patient relations.  Patient's backpack and an additional bag sent to patient relations.    "

## 2018-06-12 NOTE — PROGRESS NOTES
[facility] Hospitalist Progress Note    Date of Service: 2018    Chief Complaint  32 y.o. male admitted 2018 with DKA, UGIB and acute resp failure.  Presented to an outlying facility.  Had apparently not been taking his insulin.  Intubated and transferred to us.      PMHx: TIDM, tobacco abuse, chronic back pain, homeless    Interval Problem Update  Off prop  On precedex fentanyl  Follows command  Off pressors  Pulled cortrack out  On LR 83  CT on suction: 15ml out  tolerting SBT  K 2.9 Mg 1.8   10u ISS   Start lovenox  Possible extubation     Consultants/Specialty  Pulmonology    Disposition  ICU        Review of Systems   Unable to perform ROS: Intubated      Physical Exam  Laboratory/Imaging   Hemodynamics  No data recorded.   Monitored Temp: 36.6 °C (97.9 °F)  Pulse  Av.8  Min: 52  Max: 129 Heart Rate (Monitored): 86  NIBP: 105/56      Respiratory  Frey Vent Mode: APVCMV, Rate (breaths/min): 18, PEEP/CPAP: 8, PEEP/CPAP: 8, FiO2: 30, P Peak (PIP): 18, P MEAN: 11   Respiration: 18, Pulse Oximetry: 98 %  Chest Tube Group 1 (A) Right 28-Tube Status / Drainage: Patent;Serosanguinous, Chest Tube Group 1 (A) Right 28-Device: Suction 20 cm Water  Work Of Breathing / Effort: Vented  RUL Breath Sounds: Clear After Suction, RML Breath Sounds: Clear After Suction, RLL Breath Sounds: Diminished, LUIS ALBERTO Breath Sounds: Clear After Suction, LLL Breath Sounds: Diminished    Fluids    Intake/Output Summary (Last 24 hours) at 18 0743  Last data filed at 18 0600   Gross per 24 hour   Intake           2904.8 ml   Output             1330 ml   Net           1574.8 ml       Nutrition  Orders Placed This Encounter   Procedures   • Diet NPO     Standing Status:   Standing     Number of Occurrences:   1     Order Specific Question:   Type:     Answer:   Now [1]     Order Specific Question:   Restrict to:     Answer:   Strict [1]     Physical Exam   Constitutional: He appears well-developed and well-nourished.   HENT:    Head: Normocephalic and atraumatic.   Right Ear: External ear normal.   Left Ear: External ear normal.   Mouth/Throat: No oropharyngeal exudate.   Eyes: Conjunctivae are normal. Pupils are equal, round, and reactive to light. Right eye exhibits no discharge. Left eye exhibits no discharge.   Neck: Neck supple. No JVD present. No tracheal deviation present.   Cardiovascular: Normal rate and regular rhythm.  Exam reveals no gallop and no friction rub.    No murmur heard.  Pulmonary/Chest: Effort normal. No stridor. No respiratory distress. He has no wheezes. He exhibits no tenderness.   intubated   Abdominal: Soft. Bowel sounds are normal. He exhibits no distension. There is no tenderness. There is no rebound and no guarding.   Musculoskeletal: He exhibits no edema.   Neurological:   Sedated  No focal deficits   Skin: Skin is warm and dry.   Psychiatric:   Sedated   Nursing note and vitals reviewed.      Recent Labs      06/10/18   0527   06/10/18   2205  06/11/18   0545  06/12/18   0406   WBC  9.7   --    --   7.7  5.5   RBC  4.83   --    --   4.50*  3.72*   HEMOGLOBIN  14.9   < >  14.5  13.6*  11.3*   HEMATOCRIT  42.1   --    --   39.7*  33.2*   MCV  87.2   --    --   88.2  89.2   MCH  30.8   --    --   30.2  30.4   MCHC  35.4*   --    --   34.3  34.0   RDW  37.6   --    --   39.5  40.4   PLATELETCT  279   --    --   214  173   MPV  10.0   --    --   10.0  10.1    < > = values in this interval not displayed.     Recent Labs      06/10/18   0154  06/11/18   0545  06/12/18   0406   SODIUM  140  143  144   POTASSIUM  3.8  2.6*  2.9*   CHLORIDE  113*  110  112   CO2  20  25  20   GLUCOSE  174*  85  164*   BUN  23*  14  13   CREATININE  1.02  0.73  0.53   CALCIUM  8.2*  8.4*  7.4*             Recent Labs      06/09/18   2044  06/11/18   0545   TRIGLYCERIDE  192*  153*          Assessment/Plan     * DKA (diabetic ketoacidoses) ([facility])   Assessment & Plan    DKA Resolved  Continue Lantus sliding scale insulin             Hematemesis   Assessment & Plan    Probable MW tear given presentation and no significant drop in Hgb  Bleeding clinically resolved  Transition to Pepcid           Type I diabetes mellitus (HCC)- (present on admission)   Assessment & Plan    Uncontrolled with hyperglycemia  Continue Lantus 15 units and sliding scale insulin        Electrolyte abnormality   Assessment & Plan    Hypokalemia  Hypomagnesemia    Replete and monitor        Postprocedural pneumothorax   Assessment & Plan    s/p CT placed 6/11        Acute respiratory failure (HCC)   Assessment & Plan    Intubated 6/10  Chest tube on 6-11  Vent management per critical care discussed with Dr. Solorzano,   Tolerated  SBT  possible extubation today           Quality-Core Measures   Reviewed items::  Radiology images reviewed, EKG reviewed, Labs reviewed and Medications reviewed  Conner catheter::  Unconscious / Sedated Patient on a Ventilator  Central line in place:  Need for access  DVT prophylaxis pharmacological::  Contraindicated - High bleeding risk  DVT prophylaxis - mechanical:  SCDs

## 2018-06-12 NOTE — PROGRESS NOTES
Dr. Solorzano at bedside to assess patient.  Ok to complete RN dysphagia screening.  Advance diet to clears as tolerated.  Chest tube placed to water seal.

## 2018-06-12 NOTE — CARE PLAN
Problem: Respiratory:  Goal: Respiratory status will improve    Intervention: Collaborate with respiratory therapist and Interdisciplinary Team on treatment measures to improve respiratory function  RN and RT monitoring pt's respiratory status. Pt currently on 30% O2. Possible extubation tomorrow.

## 2018-06-12 NOTE — CARE PLAN
Problem: Pain Management  Goal: Pain level will decrease to patient's comfort goal    Intervention: Follow pain managment plan developed in collaboration with patient and Interdisciplinary Team  Monitoring pt's nonverbal pain indicators. Pt sedated and intubate.

## 2018-06-12 NOTE — PROGRESS NOTES
Pulmonary Critical Care Progress Note        Chief Complaint: Acute hypoxic respiratory failure, DKA, and hematemesis.    History of Present Illness: This is a 32-year-old male, who was transferred   from an outside facility.  All information taken from chart review as well as   sign-out as patient presently on full mechanical ventilatory support.  It   appears that he has a longstanding history of insulin-dependent diabetes,   chronic tobacco use, back pain, and homelessness.  The best I can gather from   the chart is that he has not taken his insulin in approximately 8 days.  He   presented to outside facility with nausea, vomiting, and bloody vomitus.  The   patient was given subQ as well as IV insulin, started on IV fluids.  After   noted blood in his emesis, started on Protonix as well as octreotide and at   some point had developed respiratory failure requiring intubation and   subsequently transferred to [facility] for higher level of care.   At the present   time, he is on full mechanical ventilatory support, sedated with propofol.    Hemodynamics appear intact, not requiring any pressor therapy.  Upon initial   arrival, he did have one single episode of emesis, which was more brown,   cloudy in color, no obvious coffee-ground or bright red appearance.  No   further information at the present time currently available.    Please note above history obtained by my partner  on 6/9/2018.     ROS:  Respiratory: negative, Cardiac: negative, GI: negative.  All other systems negative.    Interval Events:  24 hour interval history reviewed    Remains on Precedex, propofol, and fentanyl infusions, but being weaned..  Pulled out cortrack overnight.  RIJ in place.  IVF at 83 cc/hour  CT Ok. With near resolution of pneumothorax yesterday afternoon.  15 cc from CT on right  Decrease RR to 14 from 18    Apical pneumothorax resolved and only subcutaneous air is noted on x-ray  GI bleed with no evidence of  bleeding at this point  No heparin/lovenox because of concern of gastrointestinal hemorrhage, but safe to begin Heparin sub q   Kphos this AM  And Mag replacement  Insulin 10 units and 15 lantus  Cortrack was pulled out overnight by patient  Extubate today.  Pepcid from PPI    PFSH:  No change.    Respiratory:  Frey Vent Mode: APVCMV, Rate (breaths/min): 18, Vt Target (mL): 420, PEEP/CPAP: 8, FiO2: 30, Static Compliance (ml / cm H2O): 53, Control VTE (exp VT): 420  Pulse Oximetry: 98 %  Chest Tube Drains:          Exam: rhonchi right > left, with some percussible air of the right hemithorax. There is subcutaneous air over the right upper lower neck. The site from subclavian catheter on the right had minor subcutaneous air as well. Chest tube in place in good position. There is no significant tiling noted. No significant drainage into the chamber.  Imaging: Only subcutaneous air is noted on today's chest x-ray. Chest tubes in good position. There is ET tube in good position. There is a right IJ central catheter in good position.    Recent Labs      06/10/18   0535  06/11/18   0459  06/12/18   0538   ISTATAPH  7.385*  7.536*  7.515*   ISTATAPCO2  31.3  29.3  30.8   ISTATAPO2  98*  134*  122*   ISTATATCO2  20  26  26   [id number]  98  99  99   ISTATARTHCO3  18.7  24.8  24.8   ISTATARTBE  -5*  3  2   ISTATTEMP  99.9 F  97.5 F  97.9 F   ISTATFIO2  30  30  30   ISTATSPEC  Arterial  Arterial  Arterial   ISTATAPHTC  7.375*  7.546*  7.521*   [id number]  102*  130*  120*   Respiratory rate was decreased based on the arterial blood gas above.    HemoDynamics:  Pulse: 78, Heart Rate (Monitored): 86  NIBP: 105/56       Exam: regular rate and rhythm no obvious murmur, rub, gallop. Right IJ central catheter in good position. There is a new right-sided chest tube placed.  Imaging: None - Reviewed        Neuro:  GCS Total Milad Coma Score: 8       Exam: no focal deficits noted, and patient more cooperative. Sedation being  held for possible spontaneous breathing trial and extubation attempt.  Imaging: None - Reviewed    Fluids:  Intake/Output       06/10/18 0700 - 06/11/18 0659 06/11/18 0700 - 06/12/18 0659 06/12/18 0700 - 06/13/18 0659      [id number] 0486-1725 Total 0700-1859 1900-0659 Total 0700-1859 1900-0659 Total       Intake    I.V.  1557.8  1647.3 3205.1  1325.6  1399.2 2724.8  --  -- --    Fentanyl Volume -- -- -- -- 20 20 -- -- --    Precedex Volume -- -- -- 15.5 143.7 159.2 -- -- --    Octreotide Volume 20 -- 20 -- -- -- -- -- --    Propofol Volume 37.8 447.3 485.1 310.2 194.2 504.4 -- -- --    Norepinephrine Volume -- -- -- 0 45.3 45.3 -- -- --    IV Volume (LR) 1500 1200 2700 6507 839 3869 -- -- --    Enteral  --  -- --  80  100 180  --  -- --    Intake (mL) ([REMOVED] Enteral Tube 10 F Left Nare Cortrak Gastric Feeding Tube) -- -- -- 80 100 180 -- -- --    Total Intake 1557.8 1647.3 3205.1 1405.6 1499.2 2904.8 -- -- --       Output    Urine  950  660 1610  520  750 1270  --  -- --    Output (mL) (Urinary Catheter Indwelling Catheter 16 fr)   -- -- --    Drains  --  -- --  --  0 0  --  -- --    Residual Amount (ml) (Discarded) -- -- -- -- 0 0 -- -- --    Stool  --  -- --  --  -- --  --  -- --    Number of Times Stooled 1 x -- 1 x 0 x -- 0 x -- -- --    Emesis/NG output  --  400 400  --  -- --  --  -- --    Output (mL) ([REMOVED] Enteral Tube Right Nare Nasogastric) -- 400 400 -- -- -- -- -- --    Chest Tube  --  -- --  --  45 45  --  -- --    Output (mL) (Chest Tube Group 1 (A) Right 28) -- -- -- -- 45 45 -- -- --    Total Output 950 1060 2010  -- -- --       Net I/O     607.8 587.3 1195.1 885.6 704.2 1589.8 -- -- --        Weight: 72 kg (158 lb 11.7 oz)  Recent Labs      06/10/18   0154  06/10/18   0527  06/11/18   0545  06/12/18   0406   SODIUM  140   --   143  144   POTASSIUM  3.8   --   2.6*  2.9*   CHLORIDE  113*   --   110  112   CO2  20   --   25  20   BUN  23*   --   14  13    CREATININE  1.02   --   0.73  0.53   MAGNESIUM   --   2.0  2.0  1.8   PHOSPHORUS   --   1.5*  1.2*  2.9   CALCIUM  8.2*   --   8.4*  7.4*       GI/Nutrition:  Exam: abdomen is soft and non-tender   Imaging: abdominal plain film Reviewed  tube feed At goal, withheld for probable extubation  Liver Function  Recent Labs      06/09/18   2044  06/10/18   0154  06/10/18   0527  06/11/18   0545  18   0406   ALTSGPT  19   --    --   12  10   ASTSGOT  23   --    --   17  14   ALKPHOSPHAT  89   --    --   66  52   TBILIRUBIN  0.5   --    --   0.3  0.3   LIPASE  301*   --   54   --   3*   GLUCOSE  237*  174*   --   85  164*       Heme:  Recent Labs      06/10/18   0527   06/10/18   2205  06/11/18   0545  18   0406   RBC  4.83   --    --   4.50*  3.72*   HEMOGLOBIN  14.9   < >  14.5  13.6*  11.3*   HEMATOCRIT  42.1   --    --   39.7*  33.2*   PLATELETCT  279   --    --   214  173    < > = values in this interval not displayed.       Infectious Disease:  Monitored Temp 2  Av.3 °C (97.3 °F)  Min: 35.9 °C (96.6 °F)  Max: 37 °C (98.6 °F)  Micro: no cultures pending, except sputum  Recent Labs      06/09/18   2044  06/10/18   0527  06/11/18   0545  18   0406   WBC  16.8*  9.7  7.7  5.5   NEUTSPOLYS  85.60*  80.90*  64.30  62.60   LYMPHOCYTES  6.40*  10.40*  28.70  29.60   MONOCYTES  7.00  8.10  5.80  5.10   EOSINOPHILS  0.00  0.00  0.60  1.80   BASOPHILS  0.20  0.10  0.30  0.50   ASTSGOT  23   --   17  14   ALTSGPT  19   --   12  10   ALKPHOSPHAT  89   --   66  52   TBILIRUBIN  0.5   --   0.3  0.3     Current Facility-Administered Medications   Medication Dose Frequency Provider Last Rate Last Dose   • potassium chloride in water (KCL) ivpb **Administer in ICU only** 40 mEq  40 mEq Once Jose Luis Solorzano D.O.        Followed by   • potassium chloride in water (KCL) ivpb **Administer in ICU only** 20 mEq  20 mEq Once RODRIGO DossO.       • magnesium sulfate 2 g in NS 50 mL IVPB  2 g Once Jose Luis ARTHUR  LEELA Solorzano.       • midazolam (VERSED) 2 MG/2ML injection 1 mg  1 mg Q HOUR PRN RODRIGO DossOFredy   1 mg at 06/11/18 0724   • Pharmacy Consult: Enteral tube feeding - review meds/change route/product selection  1 Each PRN Jaquan Bro D.O.       • omeprazole 2 mg/mL in sodium bicarbonate (PRILOSEC) oral susp 40 mg  40 mg DAILY Jose Luis Solorzano D.O.       • senna-docusate (PERICOLACE or SENOKOT S) 8.6-50 MG per tablet 2 Tab  2 Tab BID RODRIGO DossO.   2 Tab at 06/11/18 2132    And   • polyethylene glycol/lytes (MIRALAX) PACKET 1 Packet  1 Packet QDAY PRN Jose Luis Solorzano D.O.        And   • magnesium hydroxide (MILK OF MAGNESIA) suspension 30 mL  30 mL QDAY PRN Jose Luis Solorzano D.O.        And   • bisacodyl (DULCOLAX) suppository 10 mg  10 mg QDAY PRN Jose Luis Solorzano D.O.       • oxyCODONE immediate-release (ROXICODONE) tablet 5 mg  5 mg Q4HRS PRN Jose Luis Solorzano D.O.        Or   • oxyCODONE immediate release (ROXICODONE) tablet 10 mg  10 mg Q4HRS PRN RODRIGO DossO.       • dexmedetomidine (PRECEDEX) 400 mcg in D5W 100 mL infusion  0-1.5 mcg/kg/hr Continuous Jose Luis Solorzano D.O. 11.2 mL/hr at 06/12/18 0130 0.7 mcg/kg/hr at 06/12/18 0130   • norepinephrine (LEVOPHED) 8 mg in  mL Infusion  0-30 mcg/min Continuous Jose Luis Solorzano D.O.   Stopped at 06/12/18 0529   • fentaNYL (SUBLIMAZE) 50 mcg/mL in 50mL (Continuous Infusion)   Continuous RODRIGO DossO. 2 mL/hr at 06/12/18 0424 2,500 mcg at 06/12/18 0424   • propofol (DIPRIVAN) injection  0-80 mcg/kg/min Continuous Chris Fuentes M.D. 18.9 mL/hr at 06/12/18 0633 50 mcg/kg/min at 06/12/18 0633   • Respiratory Care per Protocol   Continuous RT Chris Fuentes M.D.       • MD ALERT...Adult ICU Electrolyte Replacement per Pharmacy Protocol   pharmacy to dose Chris Fuentes M.D.       • fentaNYL (SUBLIMAZE) injection 25 mcg  25 mcg Q HOUR PRN Chris Fuentes M.D.        Or   • fentaNYL (SUBLIMAZE) injection 50 mcg  50 mcg Q  HOUR PRN Chris Fuentes M.D.   50 mcg at 06/10/18 1334    Or   • fentaNYL (SUBLIMAZE) injection 100 mcg  100 mcg Q HOUR PRN Chris Fuentes M.D.   100 mcg at 06/10/18 2350   • ipratropium-albuterol (DUONEB) nebulizer solution  3 mL Q2HRS PRN (RT) Chris Fuentes M.D.       • ipratropium-albuterol (DUONEB) nebulizer solution  3 mL Q4HRS (RT) Chris Fuentes M.D.   3 mL at 06/12/18 0621   • lactated ringers infusion   Continuous Jose Luis Solorzano D.O. 83 mL/hr at 06/12/18 0055     • insulin glargine (LANTUS) injection 15 Units  15 Units Q EVENING Chris Fuentes M.D.   15 Units at 06/11/18 2128   • insulin regular (HUMULIN R) injection 3-14 Units  3-14 Units Q6HRS Chris Fuentes M.D.   4 Units at 06/12/18 0108    And   • glucose 4 g chewable tablet 16 g  16 g Q15 MIN PRN Chris Fuentes M.D.        And   • dextrose 50% (D50W) injection 25 mL  25 mL Q15 MIN PRN Chris Fuentes M.D.         Last reviewed on 6/10/2018  3:25 AM by Fer Epps RFredyNFredy    Quality  Measures:   Labs reviewed, Medications reviewed and Radiology images reviewed   Conner catheter: Critically Ill - Requiring Accurate Measurement of Urinary Output   Central line in place: Need for access and Concentrated IV drugs     DVT Prophylaxis: Contraindicated - High bleeding risk   DVT prophylaxis - mechanical: SCDs   Ulcer prophylaxis: Yes      Assessment and plan:     1. Acute hypoxemic respiratory failure.    -Patient was intubated initially for airway protection at [facility]. This was on 6/9/2018.  -Patient did require chest tube placement as Central line placement resulted in pneumothorax most likely  -I perform this on 6/11/2018 based on chest x-ray.  28 [place] chest tube in position.The area pneumothorax is resolved. Now only subcutaneous air is noted around the neck.  -Continue to follow for signs of infection.  -Sputum culture to be ordered.  -Hold on spontaneous breathing trial until we have resolved  pneumothorax/pneumomediastinum.  -Possible CT of the chest may be indicated.  -Repeat chest x-ray in a.m. after chest tube to waterseal is placed.    2. Diabetic ketoacidosis at presentation    -Patient has been transitioned to Lantus and sliding scale for now.  -Off insulin infusion.  -Follow typical sliding scale as well.    3. Hematemesis.    -Patient initially on octreotide as well as Protonix infusion  -At this point he shows signs of constipation on KUB today  -Will observe closely for possible need for GI evaluation  -Continue with omeprazole for now.  -Off infusions up Protonix and octreotide.  -We'll begin heparin subcu again today as the patient is having no signs of bleeding. This is just for DVT prophylaxis.    4. Acute kidney injury.    -Patient initially had oliguria and was extremely dehydrated.  -This is likely cause of slight worsening renal insufficiency as well as diabetic ketoacidosis  -Continue to follow BUN and creatinine as well as potassium closely.  -Improving and Conner to be removed today    5. Homelessness with incomplete database.    Discussed patient condition and risk of morbidity and/or mortality with multidisciplinary rounds  The patient remains critically ill.  Critical care time = 35 minutes in directly providing and coordinating critical care and extensive data review.  No time overlap and excludes procedures.    Jose Luis Solorzano D.O.

## 2018-06-12 NOTE — CARE PLAN
Problem: Ventilation Defect:  Goal: Ability to achieve and maintain unassisted ventilation or tolerate decreased levels of ventilator support    Intervention: Support and monitor invasive and noninvasive mechanical ventilation  Adult Ventilation Update    Total Vent Days: 4    Patient Lines/Drains/Airways Status    Active Airway     Name: Placement date: Placement time: Site: Days:    Airway Group ET Tube Oral 8.0 06/09/18      Oral   4              Cough: Productive (06/12/18 0000)  Sputum Amount: Small (06/12/18 0000)  Sputum Color: Clear (06/12/18 0000)  Sputum Consistency: Thin (06/12/18 0000)    Mobility  Level of Mobility: Level I (06/11/18 2000)  Activity Performed: Unable to mobilize (pt able to move extremities when awake) (06/11/18 2000)  Pt Calls for Assistance: No (06/11/18 1200)  Gait: Unable to Ambulate (06/12/18 0000)  Reason Not Mobilized: Bed rest (06/11/18 2000)

## 2018-06-12 NOTE — RESPIRATORY CARE
Extubation    Cuff leak noted YES  Stridor present NO    6lpm NC       Patient toleration Well, no complications  RCP Complete? yes  Events/Summary/Plan: Per Dr. Solorzano SBT as ivette until RN and RT agree on extubation. parameters pulled pt extubated. Clear bi lat breath sounds noted neg stridor. No signs of respiratory distress. Will continue to monitor.

## 2018-06-12 NOTE — ASSESSMENT & PLAN NOTE
Uncontrolled with hyperglycemia  Secondary to noncompliance   hema1c 16.3  lantus  s.s.insulin  accu checks are noted

## 2018-06-13 ENCOUNTER — APPOINTMENT (OUTPATIENT)
Dept: RADIOLOGY | Facility: MEDICAL CENTER | Age: 33
DRG: 208 | End: 2018-06-13
Attending: INTERNAL MEDICINE
Payer: OTHER GOVERNMENT

## 2018-06-13 LAB
ALBUMIN SERPL BCP-MCNC: 2.6 G/DL (ref 3.2–4.9)
ALBUMIN/GLOB SERPL: 1.1 G/DL
ALP SERPL-CCNC: 56 U/L (ref 30–99)
ALT SERPL-CCNC: 14 U/L (ref 2–50)
ANION GAP SERPL CALC-SCNC: 6 MMOL/L (ref 0–11.9)
AST SERPL-CCNC: 25 U/L (ref 12–45)
BASOPHILS # BLD AUTO: 0.5 % (ref 0–1.8)
BASOPHILS # BLD: 0.03 K/UL (ref 0–0.12)
BILIRUB SERPL-MCNC: 0.4 MG/DL (ref 0.1–1.5)
BUN SERPL-MCNC: 10 MG/DL (ref 8–22)
CALCIUM SERPL-MCNC: 7.4 MG/DL (ref 8.5–10.5)
CHLORIDE SERPL-SCNC: 105 MMOL/L (ref 96–112)
CO2 SERPL-SCNC: 25 MMOL/L (ref 20–33)
CREAT SERPL-MCNC: 0.58 MG/DL (ref 0.5–1.4)
EOSINOPHIL # BLD AUTO: 0.21 K/UL (ref 0–0.51)
EOSINOPHIL NFR BLD: 3.5 % (ref 0–6.9)
ERYTHROCYTE [DISTWIDTH] IN BLOOD BY AUTOMATED COUNT: 39.7 FL (ref 35.9–50)
GLOBULIN SER CALC-MCNC: 2.4 G/DL (ref 1.9–3.5)
GLUCOSE BLD-MCNC: 213 MG/DL (ref 65–99)
GLUCOSE BLD-MCNC: 275 MG/DL (ref 65–99)
GLUCOSE BLD-MCNC: 293 MG/DL (ref 65–99)
GLUCOSE BLD-MCNC: 303 MG/DL (ref 65–99)
GLUCOSE SERPL-MCNC: 228 MG/DL (ref 65–99)
HCT VFR BLD AUTO: 33.2 % (ref 42–52)
HGB BLD-MCNC: 11 G/DL (ref 14–18)
IMM GRANULOCYTES # BLD AUTO: 0.01 K/UL (ref 0–0.11)
IMM GRANULOCYTES NFR BLD AUTO: 0.2 % (ref 0–0.9)
LYMPHOCYTES # BLD AUTO: 1.43 K/UL (ref 1–4.8)
LYMPHOCYTES NFR BLD: 24.1 % (ref 22–41)
MAGNESIUM SERPL-MCNC: 1.7 MG/DL (ref 1.5–2.5)
MCH RBC QN AUTO: 29.6 PG (ref 27–33)
MCHC RBC AUTO-ENTMCNC: 33.1 G/DL (ref 33.7–35.3)
MCV RBC AUTO: 89.2 FL (ref 81.4–97.8)
MONOCYTES # BLD AUTO: 0.39 K/UL (ref 0–0.85)
MONOCYTES NFR BLD AUTO: 6.6 % (ref 0–13.4)
NEUTROPHILS # BLD AUTO: 3.86 K/UL (ref 1.82–7.42)
NEUTROPHILS NFR BLD: 65.1 % (ref 44–72)
NRBC # BLD AUTO: 0 K/UL
NRBC BLD-RTO: 0 /100 WBC
PLATELET # BLD AUTO: 200 K/UL (ref 164–446)
PMV BLD AUTO: 10.1 FL (ref 9–12.9)
POTASSIUM SERPL-SCNC: 3.1 MMOL/L (ref 3.6–5.5)
PROT SERPL-MCNC: 5 G/DL (ref 6–8.2)
RBC # BLD AUTO: 3.72 M/UL (ref 4.7–6.1)
SODIUM SERPL-SCNC: 136 MMOL/L (ref 135–145)
WBC # BLD AUTO: 5.9 K/UL (ref 4.8–10.8)

## 2018-06-13 PROCEDURE — 700111 HCHG RX REV CODE 636 W/ 250 OVERRIDE (IP): Performed by: INTERNAL MEDICINE

## 2018-06-13 PROCEDURE — 99232 SBSQ HOSP IP/OBS MODERATE 35: CPT | Performed by: HOSPITALIST

## 2018-06-13 PROCEDURE — 700102 HCHG RX REV CODE 250 W/ 637 OVERRIDE(OP): Performed by: INTERNAL MEDICINE

## 2018-06-13 PROCEDURE — 71045 X-RAY EXAM CHEST 1 VIEW: CPT

## 2018-06-13 PROCEDURE — 770001 HCHG ROOM/CARE - MED/SURG/GYN PRIV*

## 2018-06-13 PROCEDURE — 80053 COMPREHEN METABOLIC PANEL: CPT

## 2018-06-13 PROCEDURE — 85025 COMPLETE CBC W/AUTO DIFF WBC: CPT

## 2018-06-13 PROCEDURE — 82962 GLUCOSE BLOOD TEST: CPT

## 2018-06-13 PROCEDURE — 99233 SBSQ HOSP IP/OBS HIGH 50: CPT | Performed by: INTERNAL MEDICINE

## 2018-06-13 PROCEDURE — 83735 ASSAY OF MAGNESIUM: CPT

## 2018-06-13 PROCEDURE — 700102 HCHG RX REV CODE 250 W/ 637 OVERRIDE(OP): Performed by: HOSPITALIST

## 2018-06-13 PROCEDURE — A9270 NON-COVERED ITEM OR SERVICE: HCPCS | Performed by: INTERNAL MEDICINE

## 2018-06-13 PROCEDURE — 700111 HCHG RX REV CODE 636 W/ 250 OVERRIDE (IP): Performed by: HOSPITALIST

## 2018-06-13 RX ORDER — GABAPENTIN 300 MG/1
600 CAPSULE ORAL 3 TIMES DAILY
Status: DISCONTINUED | OUTPATIENT
Start: 2018-06-13 | End: 2018-06-15 | Stop reason: HOSPADM

## 2018-06-13 RX ORDER — METOCLOPRAMIDE 10 MG/1
10 TABLET ORAL
Status: DISCONTINUED | OUTPATIENT
Start: 2018-06-13 | End: 2018-06-15 | Stop reason: HOSPADM

## 2018-06-13 RX ORDER — POTASSIUM CHLORIDE 20 MEQ/1
20 TABLET, EXTENDED RELEASE ORAL 2 TIMES DAILY
Status: DISCONTINUED | OUTPATIENT
Start: 2018-06-14 | End: 2018-06-15 | Stop reason: HOSPADM

## 2018-06-13 RX ORDER — POTASSIUM CHLORIDE 20 MEQ/1
40 TABLET, EXTENDED RELEASE ORAL 2 TIMES DAILY
Status: COMPLETED | OUTPATIENT
Start: 2018-06-13 | End: 2018-06-13

## 2018-06-13 RX ORDER — OMEPRAZOLE 20 MG/1
20 CAPSULE, DELAYED RELEASE ORAL 2 TIMES DAILY
Status: DISCONTINUED | OUTPATIENT
Start: 2018-06-13 | End: 2018-06-15 | Stop reason: HOSPADM

## 2018-06-13 RX ORDER — INSULIN GLARGINE 100 [IU]/ML
20 INJECTION, SOLUTION SUBCUTANEOUS EVERY EVENING
Status: DISCONTINUED | OUTPATIENT
Start: 2018-06-13 | End: 2018-06-15 | Stop reason: HOSPADM

## 2018-06-13 RX ORDER — MAGNESIUM SULFATE HEPTAHYDRATE 40 MG/ML
4 INJECTION, SOLUTION INTRAVENOUS ONCE
Status: COMPLETED | OUTPATIENT
Start: 2018-06-13 | End: 2018-06-13

## 2018-06-13 RX ORDER — LIDOCAINE HYDROCHLORIDE 20 MG/ML
INJECTION, SOLUTION INFILTRATION; PERINEURAL
Status: ACTIVE
Start: 2018-06-13 | End: 2018-06-13

## 2018-06-13 RX ADMIN — OXYCODONE HYDROCHLORIDE 10 MG: 10 TABLET ORAL at 04:10

## 2018-06-13 RX ADMIN — METOCLOPRAMIDE HYDROCHLORIDE 10 MG: 10 TABLET ORAL at 12:17

## 2018-06-13 RX ADMIN — GABAPENTIN 600 MG: 300 CAPSULE ORAL at 16:36

## 2018-06-13 RX ADMIN — INSULIN HUMAN 7 UNITS: 100 INJECTION, SOLUTION PARENTERAL at 20:39

## 2018-06-13 RX ADMIN — METOCLOPRAMIDE HYDROCHLORIDE 10 MG: 10 TABLET ORAL at 16:59

## 2018-06-13 RX ADMIN — OXYCODONE HYDROCHLORIDE 5 MG: 5 TABLET ORAL at 08:10

## 2018-06-13 RX ADMIN — INSULIN HUMAN 7 UNITS: 100 INJECTION, SOLUTION PARENTERAL at 12:22

## 2018-06-13 RX ADMIN — Medication 2 TABLET: at 08:10

## 2018-06-13 RX ADMIN — POTASSIUM CHLORIDE 40 MEQ: 1500 TABLET, EXTENDED RELEASE ORAL at 08:10

## 2018-06-13 RX ADMIN — ENOXAPARIN SODIUM 40 MG: 100 INJECTION SUBCUTANEOUS at 09:00

## 2018-06-13 RX ADMIN — MAGNESIUM SULFATE IN WATER 4 G: 40 INJECTION, SOLUTION INTRAVENOUS at 08:10

## 2018-06-13 RX ADMIN — Medication 2 TABLET: at 20:31

## 2018-06-13 RX ADMIN — GABAPENTIN 600 MG: 300 CAPSULE ORAL at 11:15

## 2018-06-13 RX ADMIN — POTASSIUM CHLORIDE 40 MEQ: 1500 TABLET, EXTENDED RELEASE ORAL at 20:31

## 2018-06-13 RX ADMIN — INSULIN HUMAN 4 UNITS: 100 INJECTION, SOLUTION PARENTERAL at 07:52

## 2018-06-13 RX ADMIN — OMEPRAZOLE 20 MG: 20 CAPSULE, DELAYED RELEASE ORAL at 20:31

## 2018-06-13 RX ADMIN — GABAPENTIN 600 MG: 300 CAPSULE ORAL at 20:30

## 2018-06-13 RX ADMIN — INSULIN GLARGINE 20 UNITS: 100 INJECTION, SOLUTION SUBCUTANEOUS at 20:40

## 2018-06-13 RX ADMIN — INSULIN HUMAN 10 UNITS: 100 INJECTION, SOLUTION PARENTERAL at 17:15

## 2018-06-13 RX ADMIN — OXYCODONE HYDROCHLORIDE 10 MG: 10 TABLET ORAL at 12:19

## 2018-06-13 RX ADMIN — OXYCODONE HYDROCHLORIDE 5 MG: 5 TABLET ORAL at 00:15

## 2018-06-13 RX ADMIN — OXYCODONE HYDROCHLORIDE 10 MG: 10 TABLET ORAL at 16:59

## 2018-06-13 ASSESSMENT — PAIN SCALES - GENERAL
PAINLEVEL_OUTOF10: 0
PAINLEVEL_OUTOF10: 5
PAINLEVEL_OUTOF10: 5
PAINLEVEL_OUTOF10: 7
PAINLEVEL_OUTOF10: 3
PAINLEVEL_OUTOF10: 4
PAINLEVEL_OUTOF10: 9
PAINLEVEL_OUTOF10: 7
PAINLEVEL_OUTOF10: 3
PAINLEVEL_OUTOF10: 0
PAINLEVEL_OUTOF10: 7
PAINLEVEL_OUTOF10: 8
PAINLEVEL_OUTOF10: 4
PAINLEVEL_OUTOF10: 0
PAINLEVEL_OUTOF10: 3
PAINLEVEL_OUTOF10: 5

## 2018-06-13 ASSESSMENT — LIFESTYLE VARIABLES
TOTAL SCORE: 1
ALCOHOL_USE: YES
EVER FELT BAD OR GUILTY ABOUT YOUR DRINKING: NO
TOTAL SCORE: 1
HAVE YOU EVER FELT YOU SHOULD CUT DOWN ON YOUR DRINKING: YES
CONSUMPTION TOTAL: INCOMPLETE
EVER HAD A DRINK FIRST THING IN THE MORNING TO STEADY YOUR NERVES TO GET RID OF A HANGOVER: NO
HAVE PEOPLE ANNOYED YOU BY CRITICIZING YOUR DRINKING: NO
TOTAL SCORE: 1
ALCOHOL_USE: NO

## 2018-06-13 ASSESSMENT — PATIENT HEALTH QUESTIONNAIRE - PHQ9
1. LITTLE INTEREST OR PLEASURE IN DOING THINGS: NOT AT ALL
1. LITTLE INTEREST OR PLEASURE IN DOING THINGS: NOT AT ALL
2. FEELING DOWN, DEPRESSED, IRRITABLE, OR HOPELESS: NOT AT ALL
SUM OF ALL RESPONSES TO PHQ9 QUESTIONS 1 AND 2: 0
SUM OF ALL RESPONSES TO PHQ9 QUESTIONS 1 AND 2: 0
2. FEELING DOWN, DEPRESSED, IRRITABLE, OR HOPELESS: NOT AT ALL

## 2018-06-13 NOTE — CARE PLAN
Problem: Nutritional:  Goal: Nutrition support tolerated and meeting greater than 85% of estimated needs  Outcome: MET Date Met: 06/13/18  Tub feeding discontinued. Pt now on a Diabetic diet taking % of meals.

## 2018-06-13 NOTE — CARE PLAN
Problem: Respiratory:  Goal: Respiratory status will improve  Wean O2 as tolerated. R chest tube in place to water seal.     Problem: Urinary Elimination:  Goal: Ability to reestablish a normal urinary elimination pattern will improve  Conner discontinued today. Post-removal void due by 2100. Bladder scan protocol in place.

## 2018-06-13 NOTE — PROGRESS NOTES
[facility] Hospitalist Progress Note    Date of Service: 2018    Chief Complaint  32 y.o. male admitted 2018 with DKA, UGIB and acute resp failure.  Presented to an outlying facility.  Had apparently not been taking his insulin.  Intubated and transferred to us.      PMHx: TIDM, tobacco abuse, chronic back pain, homeless    Interval Problem Update    Extubated yesterday  CT to water seal  On RA   Afebrile          Consultants/Specialty  Pulmonology    Disposition  Medical        Review of Systems   Unable to perform ROS: Intubated      Physical Exam  Laboratory/Imaging   Hemodynamics  Temp (24hrs), Av.1 °C (98.8 °F), Min:36.7 °C (98 °F), Max:37.3 °C (99.1 °F)   Temperature: 37.2 °C (99 °F), Monitored Temp: 36.6 °C (97.9 °F)  Pulse  Av.6  Min: 52  Max: 129 Heart Rate (Monitored): 92  NIBP: 144/68      Respiratory      Respiration: 18, Pulse Oximetry: 99 %, O2 Daily Delivery Respiratory : Silicone Nasal Cannula  Chest Tube Group 1 (A) Right 28-Tube Status / Drainage: Draining;Patent;Small;Serosanguinous, Chest Tube Group 1 (A) Right 28-Device: Water Seal  Work Of Breathing / Effort: Mild  RUL Breath Sounds: Clear, RML Breath Sounds: Clear, RLL Breath Sounds: Diminished, LUIS ALBERTO Breath Sounds: Clear, LLL Breath Sounds: Diminished    Fluids    Intake/Output Summary (Last 24 hours) at 18 0828  Last data filed at 18 0600   Gross per 24 hour   Intake          3246.56 ml   Output             1775 ml   Net          1471.56 ml       Nutrition  Orders Placed This Encounter   Procedures   • DIET ORDER     Standing Status:   Standing     Number of Occurrences:   1     Order Specific Question:   Diet:     Answer:   Diabetic [3]     Physical Exam   Constitutional: He appears well-developed and well-nourished.   HENT:   Head: Normocephalic and atraumatic.   Right Ear: External ear normal.   Left Ear: External ear normal.   Mouth/Throat: No oropharyngeal exudate.   Eyes: Conjunctivae are normal. Pupils are  equal, round, and reactive to light. Right eye exhibits no discharge. Left eye exhibits no discharge.   Neck: Neck supple. No JVD present. No tracheal deviation present.   Cardiovascular: Normal rate and regular rhythm.  Exam reveals no gallop and no friction rub.    No murmur heard.  Pulmonary/Chest: Effort normal. No stridor. No respiratory distress. He has no wheezes. He exhibits no tenderness.   intubated   Abdominal: Soft. Bowel sounds are normal. He exhibits no distension. There is no tenderness. There is no rebound and no guarding.   Musculoskeletal: He exhibits no edema.   Neurological:   Sedated  No focal deficits   Skin: Skin is warm and dry.   Psychiatric:   Sedated   Nursing note and vitals reviewed.      Recent Labs      06/11/18   0545  06/12/18   0406  06/13/18   0445   WBC  7.7  5.5  5.9   RBC  4.50*  3.72*  3.72*   HEMOGLOBIN  13.6*  11.3*  11.0*   HEMATOCRIT  39.7*  33.2*  33.2*   MCV  88.2  89.2  89.2   MCH  30.2  30.4  29.6   MCHC  34.3  34.0  33.1*   RDW  39.5  40.4  39.7   PLATELETCT  214  173  200   MPV  10.0  10.1  10.1     Recent Labs      06/11/18   0545  06/12/18   0406  06/13/18   0445   SODIUM  143  144  136   POTASSIUM  2.6*  2.9*  3.1*   CHLORIDE  110  112  105   CO2  25  20  25   GLUCOSE  85  164*  228*   BUN  14  13  10   CREATININE  0.73  0.53  0.58   CALCIUM  8.4*  7.4*  7.4*             Recent Labs      06/11/18   0545   TRIGLYCERIDE  153*          Assessment/Plan     * DKA (diabetic ketoacidoses) ([facility])   Assessment & Plan    DKA Resolved  Continue Lantus sliding scale insulin            Hematemesis   Assessment & Plan    Probable MW tear given presentation and no significant drop in Hgb  Bleeding clinically resolved  Resume home dose of Prilosec          Type I diabetes mellitus (HCC)- (present on admission)   Assessment & Plan    Uncontrolled with hyperglycemia  Secondary to noncompliance reports loss insulin 9 days ago   We will increase Lantus  units and continue sliding scale  insulin  Diabetic teaching  Counseled on compliance        Electrolyte abnormality   Assessment & Plan    Hypokalemia  Hypomagnesemia    Repleted, continue to monitor        Postprocedural pneumothorax   Assessment & Plan    s/p CT placed 6/11  Discussed with Dr. Solorzano he plans to DC chest tube today        Acute respiratory failure (HCC)   Assessment & Plan    Intubated 6/10  Chest tube on 6-11  Tolerated extubation 6/12/2018  Doing well on room air          Quality-Core Measures   Reviewed items::  Radiology images reviewed, EKG reviewed, Labs reviewed and Medications reviewed  Conner catheter::  Unconscious / Sedated Patient on a Ventilator  Central line in place:  Need for access  DVT prophylaxis pharmacological::  Contraindicated - High bleeding risk  DVT prophylaxis - mechanical:  SCDs

## 2018-06-13 NOTE — CARE PLAN
Problem: Safety  Goal: Will remain free from injury  Patient mood fluctuates.  Patient is calm and cooperative at times.  At times agitated, threatening to leave the hospital.  Patient is tearful at times, states he is scared and is sorry for swearing and agitation.  Patient admits to being paranoid.     Problem: Respiratory:  Goal: Respiratory status will improve    Intervention: Assess and monitor pulmonary status  Extubated today, tolerating well.  R. CT placed to water seal.

## 2018-06-13 NOTE — CARE PLAN
Problem: Safety  Goal: Will remain free from injury  Bed in low locked position, alarm in use, pt demonstrated appropriate call light use, room near nurses station.     Problem: Urinary Elimination:  Goal: Ability to reestablish a normal urinary elimination pattern will improve  Pt voids in bedside urinal, output documented in flowsheets

## 2018-06-13 NOTE — PROGRESS NOTES
Pulmonary Critical Care Progress Note        Chief Complaint: Acute hypoxic respiratory failure, DKA, and hematemesis.    History of Present Illness: This is a 32-year-old male, who was transferred   from an outside facility.  All information taken from chart review as well as   sign-out as patient presently on full mechanical ventilatory support.  It   appears that he has a longstanding history of insulin-dependent diabetes,   chronic tobacco use, back pain, and homelessness.  The best I can gather from   the chart is that he has not taken his insulin in approximately 8 days.  He   presented to outside facility with nausea, vomiting, and bloody vomitus.  The   patient was given subQ as well as IV insulin, started on IV fluids.  After   noted blood in his emesis, started on Protonix as well as octreotide and at   some point had developed respiratory failure requiring intubation and   subsequently transferred to [facility] for higher level of care.   At the present   time, he is on full mechanical ventilatory support, sedated with propofol.    Hemodynamics appear intact, not requiring any pressor therapy.  Upon initial   arrival, he did have one single episode of emesis, which was more brown,   cloudy in color, no obvious coffee-ground or bright red appearance.  No   further information at the present time currently available.    Please note above history obtained by my partner  on 6/9/2018.     ROS:  Respiratory: negative, Cardiac: negative, GI: negative.  All other systems negative.    Interval Events:  24 hour interval history reviewed    Extubated and tolerating  30 cc serosang from CT  Right IJ CVP  Pepcid and back to PPI  Home meds to start  Low k and mag  Precedex off  Pull chest tube today and xray 4 hours  CVP out and peripheral IV planned    PFSH:  No change.    Respiratory:     Pulse Oximetry: 99 %  Chest Tube Drains:          Exam: rhonchi right > left, with some percussible air of the right  hemithorax. There is subcutaneous air over the right upper lower neck. The site from subclavian catheter on the right had minor subcutaneous air as well. Chest tube in place in good position. There is no significant tiling noted. No significant drainage into the chamber. Please see separate note regarding removal of chest tube today.  Imaging: Only subcutaneous air is noted on today's chest x-ray. Chest tubes in good position. There is ET tube in good position. There is a right IJ central catheter in good position. Follow-up chest x-ray at 4 PM.    Recent Labs      06/11/18   0459  06/12/18   0538   ISTATAPH  7.536*  7.515*   ISTATAPCO2  29.3  30.8   ISTATAPO2  134*  122*   ISTATATCO2  26  26   PPANKZT0PDI  99  99   ISTATARTHCO3  24.8  24.8   ISTATARTBE  3  2   ISTATTEMP  97.5 F  97.9 F   ISTATFIO2  30  30   ISTATSPEC  Arterial  Arterial   ISTATAPHTC  7.546*  7.521*   [id number]  130*  120*   Respiratory rate was decreased based on the arterial blood gas above.    HemoDynamics:  Pulse: 93, Heart Rate (Monitored): 92  NIBP: 144/68       Exam: regular rate and rhythm no obvious murmur, rub, gallop. Right IJ central catheter in good position. There is a new right-sided chest tube placed. No significant bleeding and only minimal titling with deep inspiration noted.  Imaging: None - Reviewed        Neuro:  GCS Total Milad Coma Score: 15       Exam: no focal deficits noted, and patient more cooperative. Sedation being held for possible spontaneous breathing trial and extubation attempt.  Imaging: None - Reviewed    Fluids:  Intake/Output       06/11/18 0700 - 06/12/18 0659 06/12/18 0700 - 06/13/18 0659 06/13/18 0700 - 06/14/18 0659      9203-7431 2143-8140 Total 5111-8621 4634-1860 Total 1972-1074 5710-5850 Total       Intake    P.O.  --  -- --  2500  -- 2500  --  -- --    P.O. -- -- -- 2500 -- 2500 -- -- --    I.V.  1325.6  1399.2 2724.8  971  -- 971  --  -- --    Fentanyl Volume -- 20 20 8 -- 8 -- -- --    Magnesium  Sulfate Volume -- -- -- 50 -- 50 -- -- --    Precedex Volume 15.5 143.7 159.2 51 -- 51 -- -- --    Propofol Volume 310.2 194.2 504.4 32 -- 32 -- -- --    Norepinephrine Volume 0 45.3 45.3 -- -- -- -- -- --    IV Volume (LR) 0077 278 5323 830 -- 830 -- -- --    Enteral  80  100 180  --  -- --  --  -- --    Intake (mL) ([REMOVED] Enteral Tube 10 F Left Nare Cortrak Gastric Feeding Tube) 80 100 180 -- -- -- -- -- --    Total Intake 1405.6 1499.2 2904.8 3471 -- 3471 -- -- --       Output    Urine  520  750 1270  500  1200 1700  --  -- --    Number of Times Voided -- -- -- -- 1 x 1 x -- -- --    Urine Void (mL) (non-catheter) -- -- -- -- 1200 1200 -- -- --    Output (mL) ([REMOVED] Urinary Catheter Indwelling Catheter 16 fr)  500 -- 500 -- -- --    Drains  --  0 0  --  -- --  --  -- --    Residual Amount (ml) (Discarded) -- 0 0 -- -- -- -- -- --    Stool  --  -- --  --  -- --  --  -- --    Number of Times Stooled 0 x -- 0 x -- -- -- -- -- --    Chest Tube  --  60 60  45  30 75  --  -- --    Output (mL) (Chest Tube Group 1 (A) Right 28) -- 60 60 45 30 75 -- -- --    Total Output  545 1230 1775 -- -- --       Net I/O     885.6 689.2 1574.8 2926 -1230 1696 -- -- --        Weight: 68.1 kg (150 lb 2.1 oz)  Recent Labs      06/11/18   0545  06/12/18   0406  06/13/18   0445   SODIUM  143  144  136   POTASSIUM  2.6*  2.9*  3.1*   CHLORIDE  110  112  105   CO2  25  20  25   BUN  14  13  10   CREATININE  0.73  0.53  0.58   MAGNESIUM  2.0  1.8  1.7   PHOSPHORUS  1.2*  2.9   --    CALCIUM  8.4*  7.4*  7.4*       GI/Nutrition:  Exam: abdomen is soft and non-tender   Imaging: abdominal plain film Reviewed  Taking by mouth after extubation yesterday  Liver Function  Recent Labs      06/11/18   0545  06/12/18   0406  06/13/18   0445   ALTSGPT  12  10  14   ASTSGOT  17  14  25   ALKPHOSPHAT  66  52  56   TBILIRUBIN  0.3  0.3  0.4   LIPASE   --   3*   --    GLUCOSE  85  164*  228*       Heme:  Recent Labs       18   0545  186  185   RBC  4.50*  3.72*  3.72*   HEMOGLOBIN  13.6*  11.3*  11.0*   HEMATOCRIT  39.7*  33.2*  33.2*   PLATELETCT  214  173  200       Infectious Disease:  Monitored Temp 2  Av.6 °C (97.9 °F)  Min: 36.5 °C (97.7 °F)  Max: 36.7 °C (98.1 °F)  Temp  Av.1 °C (98.8 °F)  Min: 36.7 °C (98 °F)  Max: 37.3 °C (99.1 °F)  Micro: no cultures pending, except sputum, But not sent at this point.  Recent Labs      1845  18   WBC  7.7  5.5  5.9   NEUTSPOLYS  64.30  62.60  65.10   LYMPHOCYTES  28.70  29.60  24.10   MONOCYTES  5.80  5.10  6.60   EOSINOPHILS  0.60  1.80  3.50   BASOPHILS  0.30  0.50  0.50   ASTSGOT  17  14  25   ALTSGPT  12  10  14   ALKPHOSPHAT  66  52  56   TBILIRUBIN  0.3  0.3  0.4     Current Facility-Administered Medications   Medication Dose Frequency Provider Last Rate Last Dose   • magnesium sulfate IVPB premix 4 g  4 g Once BRITNEY Doss.OFredy       • potassium chloride SA (Kdur) tablet 40 mEq  40 mEq BID Jose Luis Solorzano D.O.       • enoxaparin (LOVENOX) inj 40 mg  40 mg DAILY Henry Escoto M.D.   40 mg at 18 1112   • famotidine (PEPCID) injection 20 mg  20 mg BID Henry Escoto M.D.   20 mg at 18   • senna-docusate (PERICOLACE or SENOKOT S) 8.6-50 MG per tablet 2 Tab  2 Tab BID Jose Luis Solorzano D.O.        And   • polyethylene glycol/lytes (MIRALAX) PACKET 1 Packet  1 Packet QDAY PRN RODRIGO DossO.        And   • magnesium hydroxide (MILK OF MAGNESIA) suspension 30 mL  30 mL QDAY PRBRITNEY Barrera.O.        And   • bisacodyl (DULCOLAX) suppository 10 mg  10 mg QDAY PRBRITNEY Barrera.O.       • oxyCODONE immediate-release (ROXICODONE) tablet 5 mg  5 mg Q4HRS PRBRITNEY Barrera.O.   5 mg at 18 0015    Or   • oxyCODONE immediate release (ROXICODONE) tablet 10 mg  10 mg Q4HRS PRSANCHO Solorzano, D.O.   10 mg at 18 9990   • insulin regular (HUMULIN R)  injection 3-14 Units  3-14 Units 4X/DAY RADHA Peacock M.D.   7 Units at 06/12/18 2053    And   • glucose 4 g chewable tablet 16 g  16 g Q15 MIN PRN Hari Peacock M.D.        And   • dextrose 50% (D50W) injection 25 mL  25 mL Q15 MIN PRN Hari Peacock M.D.       • dexmedetomidine (PRECEDEX) 400 mcg in D5W 100 mL infusion  0-1.5 mcg/kg/hr Continuous Jose Luis Solorzano D.O.   Stopped at 06/12/18 1100   • Respiratory Care per Protocol   Continuous RT Chris Fuentes M.D.       • MD ALERT...Adult ICU Electrolyte Replacement per Pharmacy Protocol   pharmacy to dose Chris Fuentes M.D.       • fentaNYL (SUBLIMAZE) injection 25 mcg  25 mcg Q HOUR PRN Chris Fuentes M.D.        Or   • fentaNYL (SUBLIMAZE) injection 50 mcg  50 mcg Q HOUR PRN Chris Fuentes M.D.   50 mcg at 06/10/18 1334    Or   • fentaNYL (SUBLIMAZE) injection 100 mcg  100 mcg Q HOUR PRN Chris Fuentes M.D.   100 mcg at 06/10/18 2350   • ipratropium-albuterol (DUONEB) nebulizer solution  3 mL Q2HRS PRN (RT) Chris Fuentes M.D.       • insulin glargine (LANTUS) injection 15 Units  15 Units Q EVENING Chris Fuentes M.D.   15 Units at 06/12/18 2035     Last reviewed on 6/10/2018  3:25 AM by Fer Epps RJERRY    Quality  Measures:  Labs reviewed, Medications reviewed and Radiology images reviewed  Conner catheter: Critically Ill - Requiring Accurate Measurement of Urinary Output  Central line in place: Need for access and Concentrated IV drugs    DVT Prophylaxis: Contraindicated - High bleeding risk  DVT prophylaxis - mechanical: SCDs  Ulcer prophylaxis: Yes      Assessment and plan:     1. Acute hypoxemic respiratory failure.    -Patient was intubated initially for airway protection at [facility]. This was on 6/9/2018.  -Patient did require chest tube placement as Central line placement resulted in pneumothorax most likely  -I perform this on 6/11/2018 based on chest x-ray.  28 Congolese chest tube in  position.The area pneumothorax is resolved. Now only subcutaneous air is noted around the neck.  -Plans to remove the chest tube and follow-up chest x-ray in 4 hours after chest tube removal.  -Continue to follow for signs of infection.  -Sputum culture to be ordered.  -Hold on spontaneous breathing trial until we have resolved pneumothorax/pneumomediastinum.  -Possible CT of the chest may be indicated.    2. Diabetic ketoacidosis at presentation    -Patient has been transitioned to Lantus and sliding scale for now.  -Off insulin infusion.  -Follow typical sliding scale as well.    3. Hematemesis, now resolved.    -Patient initially on octreotide as well as Protonix infusion  -At this point he shows signs of constipation on KUB today  -Will observe closely for possible need for GI evaluation  -Continue with omeprazole for now.  -Off infusions up Protonix and octreotide.  -We'll begin heparin subcu again today as the patient is having no signs of bleeding.   -This is just for DVT prophylaxis.    4. Acute kidney injury.    -Patient initially had oliguria and was extremely dehydrated.  -This is likely cause of slight worsening renal insufficiency as well as diabetic ketoacidosis  -Continue to follow BUN and creatinine as well as potassium closely.  -Improving and Conner to be removed yesterday.    5. Homelessness with incomplete database.    Discussed patient condition and risk of morbidity and/or mortality with multidisciplinary rounds    Jose Luis Solorzano D.O.

## 2018-06-13 NOTE — PROGRESS NOTES
CRITICAL CARE MEDICINE   BRIEF PROGRESS NOTE    Date of service: 6/13/2018  Time: 1215 pm    I performed a removal of right sided chest tube after patient has had reexpansion of right sided pneumothorax without any complications.   2 sutures were left in at the lateral margins of the skin for now, but figure of 8 suture removed.   Dressed with gauze petroleum, and 4 x 4's and 3 tegaderms placed.    Repeat Chest xray in 4 hours.   Should change dressing in 1-2 days.          Jose Luis Solorzano D.O.  Critical Care Medicine

## 2018-06-14 ENCOUNTER — APPOINTMENT (OUTPATIENT)
Dept: RADIOLOGY | Facility: MEDICAL CENTER | Age: 33
DRG: 208 | End: 2018-06-14
Attending: INTERNAL MEDICINE
Payer: OTHER GOVERNMENT

## 2018-06-14 LAB
ALBUMIN SERPL BCP-MCNC: 2.7 G/DL (ref 3.2–4.9)
ALBUMIN/GLOB SERPL: 1 G/DL
ALP SERPL-CCNC: 60 U/L (ref 30–99)
ALT SERPL-CCNC: 15 U/L (ref 2–50)
ANION GAP SERPL CALC-SCNC: 5 MMOL/L (ref 0–11.9)
AST SERPL-CCNC: 18 U/L (ref 12–45)
BASOPHILS # BLD AUTO: 0.5 % (ref 0–1.8)
BASOPHILS # BLD: 0.03 K/UL (ref 0–0.12)
BILIRUB SERPL-MCNC: 0.6 MG/DL (ref 0.1–1.5)
BUN SERPL-MCNC: 7 MG/DL (ref 8–22)
CALCIUM SERPL-MCNC: 8.1 MG/DL (ref 8.5–10.5)
CHLORIDE SERPL-SCNC: 105 MMOL/L (ref 96–112)
CO2 SERPL-SCNC: 30 MMOL/L (ref 20–33)
CREAT SERPL-MCNC: 0.5 MG/DL (ref 0.5–1.4)
EOSINOPHIL # BLD AUTO: 0.14 K/UL (ref 0–0.51)
EOSINOPHIL NFR BLD: 2.5 % (ref 0–6.9)
ERYTHROCYTE [DISTWIDTH] IN BLOOD BY AUTOMATED COUNT: 37.3 FL (ref 35.9–50)
GLOBULIN SER CALC-MCNC: 2.7 G/DL (ref 1.9–3.5)
GLUCOSE BLD-MCNC: 208 MG/DL (ref 65–99)
GLUCOSE BLD-MCNC: 212 MG/DL (ref 65–99)
GLUCOSE BLD-MCNC: 278 MG/DL (ref 65–99)
GLUCOSE BLD-MCNC: 332 MG/DL (ref 65–99)
GLUCOSE SERPL-MCNC: 159 MG/DL (ref 65–99)
HCT VFR BLD AUTO: 34.3 % (ref 42–52)
HGB BLD-MCNC: 11.7 G/DL (ref 14–18)
IMM GRANULOCYTES # BLD AUTO: 0.02 K/UL (ref 0–0.11)
IMM GRANULOCYTES NFR BLD AUTO: 0.4 % (ref 0–0.9)
LYMPHOCYTES # BLD AUTO: 1.77 K/UL (ref 1–4.8)
LYMPHOCYTES NFR BLD: 31.5 % (ref 22–41)
MAGNESIUM SERPL-MCNC: 2 MG/DL (ref 1.5–2.5)
MCH RBC QN AUTO: 30.3 PG (ref 27–33)
MCHC RBC AUTO-ENTMCNC: 34.1 G/DL (ref 33.7–35.3)
MCV RBC AUTO: 88.9 FL (ref 81.4–97.8)
MONOCYTES # BLD AUTO: 0.4 K/UL (ref 0–0.85)
MONOCYTES NFR BLD AUTO: 7.1 % (ref 0–13.4)
NEUTROPHILS # BLD AUTO: 3.26 K/UL (ref 1.82–7.42)
NEUTROPHILS NFR BLD: 58 % (ref 44–72)
NRBC # BLD AUTO: 0 K/UL
NRBC BLD-RTO: 0 /100 WBC
PLATELET # BLD AUTO: 225 K/UL (ref 164–446)
PMV BLD AUTO: 9.9 FL (ref 9–12.9)
POTASSIUM SERPL-SCNC: 3.3 MMOL/L (ref 3.6–5.5)
PROT SERPL-MCNC: 5.4 G/DL (ref 6–8.2)
RBC # BLD AUTO: 3.86 M/UL (ref 4.7–6.1)
SODIUM SERPL-SCNC: 140 MMOL/L (ref 135–145)
WBC # BLD AUTO: 5.6 K/UL (ref 4.8–10.8)

## 2018-06-14 PROCEDURE — 700102 HCHG RX REV CODE 250 W/ 637 OVERRIDE(OP): Performed by: INTERNAL MEDICINE

## 2018-06-14 PROCEDURE — 700102 HCHG RX REV CODE 250 W/ 637 OVERRIDE(OP): Performed by: HOSPITALIST

## 2018-06-14 PROCEDURE — 83735 ASSAY OF MAGNESIUM: CPT

## 2018-06-14 PROCEDURE — A9270 NON-COVERED ITEM OR SERVICE: HCPCS | Performed by: INTERNAL MEDICINE

## 2018-06-14 PROCEDURE — 99232 SBSQ HOSP IP/OBS MODERATE 35: CPT | Performed by: FAMILY MEDICINE

## 2018-06-14 PROCEDURE — 700102 HCHG RX REV CODE 250 W/ 637 OVERRIDE(OP): Performed by: FAMILY MEDICINE

## 2018-06-14 PROCEDURE — 36415 COLL VENOUS BLD VENIPUNCTURE: CPT

## 2018-06-14 PROCEDURE — 80053 COMPREHEN METABOLIC PANEL: CPT

## 2018-06-14 PROCEDURE — A9270 NON-COVERED ITEM OR SERVICE: HCPCS | Performed by: FAMILY MEDICINE

## 2018-06-14 PROCEDURE — 770001 HCHG ROOM/CARE - MED/SURG/GYN PRIV*

## 2018-06-14 PROCEDURE — 85025 COMPLETE CBC W/AUTO DIFF WBC: CPT

## 2018-06-14 PROCEDURE — 700111 HCHG RX REV CODE 636 W/ 250 OVERRIDE (IP): Performed by: HOSPITALIST

## 2018-06-14 PROCEDURE — 82962 GLUCOSE BLOOD TEST: CPT | Mod: 91

## 2018-06-14 RX ORDER — NICOTINE 21 MG/24HR
14 PATCH, TRANSDERMAL 24 HOURS TRANSDERMAL
Status: DISCONTINUED | OUTPATIENT
Start: 2018-06-14 | End: 2018-06-15 | Stop reason: HOSPADM

## 2018-06-14 RX ADMIN — Medication 2 TABLET: at 10:18

## 2018-06-14 RX ADMIN — INSULIN HUMAN 10 UNITS: 100 INJECTION, SOLUTION PARENTERAL at 11:56

## 2018-06-14 RX ADMIN — GABAPENTIN 600 MG: 300 CAPSULE ORAL at 19:40

## 2018-06-14 RX ADMIN — INSULIN HUMAN 7 UNITS: 100 INJECTION, SOLUTION PARENTERAL at 17:05

## 2018-06-14 RX ADMIN — OXYCODONE HYDROCHLORIDE 10 MG: 10 TABLET ORAL at 10:21

## 2018-06-14 RX ADMIN — POTASSIUM CHLORIDE 20 MEQ: 1500 TABLET, EXTENDED RELEASE ORAL at 19:40

## 2018-06-14 RX ADMIN — ENOXAPARIN SODIUM 40 MG: 100 INJECTION SUBCUTANEOUS at 10:18

## 2018-06-14 RX ADMIN — NICOTINE 14 MG: 14 PATCH, EXTENDED RELEASE TRANSDERMAL at 14:47

## 2018-06-14 RX ADMIN — OXYCODONE HYDROCHLORIDE 10 MG: 10 TABLET ORAL at 23:39

## 2018-06-14 RX ADMIN — METOCLOPRAMIDE HYDROCHLORIDE 10 MG: 10 TABLET ORAL at 17:00

## 2018-06-14 RX ADMIN — GABAPENTIN 600 MG: 300 CAPSULE ORAL at 10:19

## 2018-06-14 RX ADMIN — OXYCODONE HYDROCHLORIDE 10 MG: 10 TABLET ORAL at 19:40

## 2018-06-14 RX ADMIN — Medication 2 TABLET: at 19:40

## 2018-06-14 RX ADMIN — INSULIN HUMAN 10 UNITS: 100 INJECTION, SOLUTION PARENTERAL at 19:45

## 2018-06-14 RX ADMIN — INSULIN GLARGINE 20 UNITS: 100 INJECTION, SOLUTION SUBCUTANEOUS at 22:15

## 2018-06-14 RX ADMIN — METOCLOPRAMIDE HYDROCHLORIDE 10 MG: 10 TABLET ORAL at 06:52

## 2018-06-14 RX ADMIN — METOCLOPRAMIDE HYDROCHLORIDE 10 MG: 10 TABLET ORAL at 11:53

## 2018-06-14 RX ADMIN — GABAPENTIN 600 MG: 300 CAPSULE ORAL at 14:47

## 2018-06-14 RX ADMIN — OXYCODONE HYDROCHLORIDE 10 MG: 10 TABLET ORAL at 00:58

## 2018-06-14 RX ADMIN — OXYCODONE HYDROCHLORIDE 10 MG: 10 TABLET ORAL at 14:47

## 2018-06-14 RX ADMIN — OMEPRAZOLE 20 MG: 20 CAPSULE, DELAYED RELEASE ORAL at 19:40

## 2018-06-14 RX ADMIN — OMEPRAZOLE 20 MG: 20 CAPSULE, DELAYED RELEASE ORAL at 10:19

## 2018-06-14 RX ADMIN — POTASSIUM CHLORIDE 20 MEQ: 1500 TABLET, EXTENDED RELEASE ORAL at 10:18

## 2018-06-14 ASSESSMENT — ENCOUNTER SYMPTOMS
VOMITING: 0
NECK PAIN: 0
BLURRED VISION: 0
HEMOPTYSIS: 0
ORTHOPNEA: 0
TINGLING: 0
DIZZINESS: 0
WEIGHT LOSS: 0
CHILLS: 0
SPUTUM PRODUCTION: 0
CLAUDICATION: 0
HEADACHES: 0
MYALGIAS: 0
FEVER: 0
COUGH: 0
NAUSEA: 0
PHOTOPHOBIA: 0
HEARTBURN: 0
PALPITATIONS: 0
DOUBLE VISION: 0

## 2018-06-14 ASSESSMENT — PAIN SCALES - GENERAL
PAINLEVEL_OUTOF10: 7
PAINLEVEL_OUTOF10: 10
PAINLEVEL_OUTOF10: 3
PAINLEVEL_OUTOF10: 8
PAINLEVEL_OUTOF10: 6
PAINLEVEL_OUTOF10: 8
PAINLEVEL_OUTOF10: 7
PAINLEVEL_OUTOF10: 10

## 2018-06-14 NOTE — PROGRESS NOTES
Received report from night shift RN. Assumed care of patient. Pt assessed and stable. VSS. Patient reports 10/10 pain at this time.  Administered medication for pain.  Discussed plan of care for day with patient and received verbal understanding. Call light within reach, strip alarm active, bed in low position.

## 2018-06-14 NOTE — PROGRESS NOTES
[facility] Hospitalist Progress Note    Date of Service: 2018    Chief Complaint  32 y.o. male admitted 2018 with dka,ugi bleed and resp failure    Interval Problem Update  Intubation and extubation and chest tube    Consultants/Specialty  pulmonary    Disposition  pending        Review of Systems   Constitutional: Negative for chills, fever and weight loss.   HENT: Negative for ear pain, hearing loss and tinnitus.    Eyes: Negative for blurred vision, double vision and photophobia.   Respiratory: Negative for cough, hemoptysis and sputum production.    Cardiovascular: Negative for palpitations, orthopnea and claudication.   Gastrointestinal: Negative for heartburn, nausea and vomiting.   Genitourinary: Negative for dysuria, frequency and urgency.   Musculoskeletal: Negative for joint pain, myalgias and neck pain.   Skin: Negative for itching and rash.   Neurological: Negative for dizziness, tingling and headaches.      Physical Exam  Laboratory/Imaging   Hemodynamics  Temp (24hrs), Av.1 °C (98.7 °F), Min:36.9 °C (98.4 °F), Max:37.2 °C (98.9 °F)   Temperature: 37 °C (98.6 °F)  Pulse  Av.5  Min: 52  Max: 129 Heart Rate (Monitored): (!) 101  Blood Pressure: 135/74, NIBP: 148/74      Respiratory      Respiration: 16, Pulse Oximetry: 97 %     Work Of Breathing / Effort: Mild  RUL Breath Sounds: (P)  (course, audible air), RML Breath Sounds: (P) Diminished, RLL Breath Sounds: (P) Diminished, LUIS ALBERTO Breath Sounds: (P) Clear, LLL Breath Sounds: (P) Clear    Fluids    Intake/Output Summary (Last 24 hours) at 18 1225  Last data filed at 18 0000   Gross per 24 hour   Intake             1130 ml   Output             3100 ml   Net            -1970 ml       Nutrition  Orders Placed This Encounter   Procedures   • DIET ORDER     Standing Status:   Standing     Number of Occurrences:   1     Order Specific Question:   Diet:     Answer:   Diabetic [3]     Physical Exam   Constitutional: No distress.   HENT:    Head: Normocephalic and atraumatic.   Eyes: Conjunctivae are normal. Pupils are equal, round, and reactive to light.   Neck: Normal range of motion. Neck supple.   Cardiovascular: Normal rate and regular rhythm.    Pulmonary/Chest: Effort normal and breath sounds normal.   Abdominal: Soft. Bowel sounds are normal.   Musculoskeletal: He exhibits no edema or tenderness.   Neurological: He is alert.   Skin: Skin is warm and dry. He is not diaphoretic.       Recent Labs      06/12/18   0406  06/13/18   0445  06/14/18   0535   WBC  5.5  5.9  5.6   RBC  3.72*  3.72*  3.86*   HEMOGLOBIN  11.3*  11.0*  11.7*   HEMATOCRIT  33.2*  33.2*  34.3*   MCV  89.2  89.2  88.9   MCH  30.4  29.6  30.3   MCHC  34.0  33.1*  34.1   RDW  40.4  39.7  37.3   PLATELETCT  173  200  225   MPV  10.1  10.1  9.9     Recent Labs      06/12/18   0406  06/13/18   0445  06/14/18   0535   SODIUM  144  136  140   POTASSIUM  2.9*  3.1*  3.3*   CHLORIDE  112  105  105   CO2  20  25  30   GLUCOSE  164*  228*  159*   BUN  13  10  7*   CREATININE  0.53  0.58  0.50   CALCIUM  7.4*  7.4*  8.1*                      Assessment/Plan     * DKA (diabetic ketoacidoses) ([facility])   Assessment & Plan    DKA Resolved  Continue Lantus sliding scale insulin            Hematemesis   Assessment & Plan    Has resolved  Prilosec          Type I diabetes mellitus (HCC)- (present on admission)   Assessment & Plan    Uncontrolled with hyperglycemia  Secondary to noncompliance   hema1c 16.3  lantus  s.s.insulin  accu checks are noted        Electrolyte abnormality   Assessment & Plan    Hypokalemia  Hypomagnesemia    Repleted, continue to monitor        Postprocedural pneumothorax   Assessment & Plan    s/p CT placed 6/11  Chest tube is removed on 6/13/18  Pulmonary input is noted  cxray showed:1.  Atelectasis within the left lung base.    2.  Again seen subcutaneous emphysema involving the neck and upper chest bilaterally.        Acute respiratory failure (HCC)   Assessment & Plan     Intubated 6/10  Chest tube on 6-11  Tolerated extubation 6/12/2018  Doing well on room air          Quality-Core Measures   DVT prophylaxis pharmacological::  Enoxaparin (Lovenox)

## 2018-06-14 NOTE — PROGRESS NOTES
Arrived from ICU at 0045. VSS, blood sugar 212. Meds did not transfer with patient, unable to cover. Skin intact; sacral mepilex intact, peeled back to confirm. Gauze and medipore tape to right lateral chest; no drainage.

## 2018-06-14 NOTE — CARE PLAN
Problem: Safety  Goal: Will remain free from injury    Intervention: Provide assistance with mobility  Pt ambulates OOB with x 1 standby assist

## 2018-06-14 NOTE — DISCHARGE PLANNING
Anticipated Discharge Disposition:   Home- MD indicated that pt will be discharge tomorrow.     Action:   Discussed with IDT .  Rounding done.  CM informed pt of possible discharge tomorrow. Pt said that his friend Omar will pick him up tomorrow.  He will live with them. Pt wants his personal belongings like his false teeth and shoes. Notified RN and CS is trying to locate pt's personal belongings who apparently were left in ICU.    Notified pt.      Barriers to Discharge:   Medical Clearance    Plan:   DC tomorrow when medically cleared.

## 2018-06-14 NOTE — CARE PLAN
Problem: Skin Integrity  Goal: Risk for impaired skin integrity will decrease    Intervention: Assess risk factors for impaired skin integrity and/or pressure ulcers  X 2 RN SKIN CHECK AT BEGINNING OF SHIFT

## 2018-06-14 NOTE — CARE PLAN
Problem: Communication  Goal: The ability to communicate needs accurately and effectively will improve  Outcome: PROGRESSING AS EXPECTED  Call light within reach and pt calls appropriately.     Problem: Infection  Goal: Will remain free from infection  Outcome: PROGRESSING AS EXPECTED  Pt educated on infection prevention techniques.

## 2018-06-15 VITALS
TEMPERATURE: 97.6 F | OXYGEN SATURATION: 100 % | RESPIRATION RATE: 18 BRPM | BODY MASS INDEX: 21.49 KG/M2 | HEIGHT: 70 IN | HEART RATE: 100 BPM | WEIGHT: 150.13 LBS | DIASTOLIC BLOOD PRESSURE: 98 MMHG | SYSTOLIC BLOOD PRESSURE: 151 MMHG

## 2018-06-15 LAB
ALBUMIN SERPL BCP-MCNC: 3 G/DL (ref 3.2–4.9)
ALBUMIN/GLOB SERPL: 1 G/DL
ALP SERPL-CCNC: 53 U/L (ref 30–99)
ALT SERPL-CCNC: 23 U/L (ref 2–50)
ANION GAP SERPL CALC-SCNC: 6 MMOL/L (ref 0–11.9)
AST SERPL-CCNC: 34 U/L (ref 12–45)
BASOPHILS # BLD AUTO: 0.5 % (ref 0–1.8)
BASOPHILS # BLD: 0.03 K/UL (ref 0–0.12)
BILIRUB SERPL-MCNC: 0.3 MG/DL (ref 0.1–1.5)
BUN SERPL-MCNC: 13 MG/DL (ref 8–22)
CALCIUM SERPL-MCNC: 8.2 MG/DL (ref 8.5–10.5)
CHLORIDE SERPL-SCNC: 100 MMOL/L (ref 96–112)
CO2 SERPL-SCNC: 29 MMOL/L (ref 20–33)
CREAT SERPL-MCNC: 0.64 MG/DL (ref 0.5–1.4)
EOSINOPHIL # BLD AUTO: 0.18 K/UL (ref 0–0.51)
EOSINOPHIL NFR BLD: 3.1 % (ref 0–6.9)
ERYTHROCYTE [DISTWIDTH] IN BLOOD BY AUTOMATED COUNT: 38.4 FL (ref 35.9–50)
GLOBULIN SER CALC-MCNC: 2.9 G/DL (ref 1.9–3.5)
GLUCOSE BLD-MCNC: 263 MG/DL (ref 65–99)
GLUCOSE BLD-MCNC: 344 MG/DL (ref 65–99)
GLUCOSE BLD-MCNC: 367 MG/DL (ref 65–99)
GLUCOSE SERPL-MCNC: 299 MG/DL (ref 65–99)
HCT VFR BLD AUTO: 35.3 % (ref 42–52)
HGB BLD-MCNC: 11.8 G/DL (ref 14–18)
IMM GRANULOCYTES # BLD AUTO: 0.04 K/UL (ref 0–0.11)
IMM GRANULOCYTES NFR BLD AUTO: 0.7 % (ref 0–0.9)
LYMPHOCYTES # BLD AUTO: 1.76 K/UL (ref 1–4.8)
LYMPHOCYTES NFR BLD: 30.1 % (ref 22–41)
MAGNESIUM SERPL-MCNC: 1.7 MG/DL (ref 1.5–2.5)
MCH RBC QN AUTO: 30.3 PG (ref 27–33)
MCHC RBC AUTO-ENTMCNC: 33.4 G/DL (ref 33.7–35.3)
MCV RBC AUTO: 90.5 FL (ref 81.4–97.8)
MONOCYTES # BLD AUTO: 0.45 K/UL (ref 0–0.85)
MONOCYTES NFR BLD AUTO: 7.7 % (ref 0–13.4)
NEUTROPHILS # BLD AUTO: 3.39 K/UL (ref 1.82–7.42)
NEUTROPHILS NFR BLD: 57.9 % (ref 44–72)
NRBC # BLD AUTO: 0 K/UL
NRBC BLD-RTO: 0 /100 WBC
PLATELET # BLD AUTO: 248 K/UL (ref 164–446)
PMV BLD AUTO: 9.9 FL (ref 9–12.9)
POTASSIUM SERPL-SCNC: 4.1 MMOL/L (ref 3.6–5.5)
PROT SERPL-MCNC: 5.9 G/DL (ref 6–8.2)
RBC # BLD AUTO: 3.9 M/UL (ref 4.7–6.1)
SODIUM SERPL-SCNC: 135 MMOL/L (ref 135–145)
WBC # BLD AUTO: 5.9 K/UL (ref 4.8–10.8)

## 2018-06-15 PROCEDURE — 83735 ASSAY OF MAGNESIUM: CPT

## 2018-06-15 PROCEDURE — 80053 COMPREHEN METABOLIC PANEL: CPT

## 2018-06-15 PROCEDURE — 700102 HCHG RX REV CODE 250 W/ 637 OVERRIDE(OP): Performed by: INTERNAL MEDICINE

## 2018-06-15 PROCEDURE — 700111 HCHG RX REV CODE 636 W/ 250 OVERRIDE (IP): Performed by: HOSPITALIST

## 2018-06-15 PROCEDURE — 85025 COMPLETE CBC W/AUTO DIFF WBC: CPT

## 2018-06-15 PROCEDURE — A9270 NON-COVERED ITEM OR SERVICE: HCPCS | Performed by: FAMILY MEDICINE

## 2018-06-15 PROCEDURE — 36415 COLL VENOUS BLD VENIPUNCTURE: CPT

## 2018-06-15 PROCEDURE — A9270 NON-COVERED ITEM OR SERVICE: HCPCS | Performed by: INTERNAL MEDICINE

## 2018-06-15 PROCEDURE — 700102 HCHG RX REV CODE 250 W/ 637 OVERRIDE(OP): Performed by: FAMILY MEDICINE

## 2018-06-15 PROCEDURE — 82962 GLUCOSE BLOOD TEST: CPT

## 2018-06-15 PROCEDURE — 99239 HOSP IP/OBS DSCHRG MGMT >30: CPT | Performed by: FAMILY MEDICINE

## 2018-06-15 RX ORDER — GABAPENTIN 600 MG/1
600 TABLET ORAL 3 TIMES DAILY
Qty: 20 TAB | Refills: 0 | Status: SHIPPED | OUTPATIENT
Start: 2018-06-15 | End: 2018-10-14

## 2018-06-15 RX ORDER — TRAMADOL HYDROCHLORIDE 50 MG/1
50 TABLET ORAL EVERY 8 HOURS PRN
Qty: 15 TAB | Refills: 0 | Status: SHIPPED
Start: 2018-06-15 | End: 2018-06-20

## 2018-06-15 RX ADMIN — INSULIN HUMAN 12 UNITS: 100 INJECTION, SOLUTION PARENTERAL at 12:47

## 2018-06-15 RX ADMIN — OMEPRAZOLE 20 MG: 20 CAPSULE, DELAYED RELEASE ORAL at 09:24

## 2018-06-15 RX ADMIN — METOCLOPRAMIDE HYDROCHLORIDE 10 MG: 10 TABLET ORAL at 12:42

## 2018-06-15 RX ADMIN — OXYCODONE HYDROCHLORIDE 10 MG: 10 TABLET ORAL at 04:58

## 2018-06-15 RX ADMIN — NICOTINE 14 MG: 14 PATCH, EXTENDED RELEASE TRANSDERMAL at 04:58

## 2018-06-15 RX ADMIN — ENOXAPARIN SODIUM 40 MG: 100 INJECTION SUBCUTANEOUS at 09:24

## 2018-06-15 RX ADMIN — POTASSIUM CHLORIDE 20 MEQ: 1500 TABLET, EXTENDED RELEASE ORAL at 09:24

## 2018-06-15 RX ADMIN — INSULIN HUMAN 4 UNITS: 100 INJECTION, SOLUTION PARENTERAL at 07:41

## 2018-06-15 RX ADMIN — OXYCODONE HYDROCHLORIDE 10 MG: 10 TABLET ORAL at 12:42

## 2018-06-15 RX ADMIN — GABAPENTIN 600 MG: 300 CAPSULE ORAL at 09:24

## 2018-06-15 RX ADMIN — METOCLOPRAMIDE HYDROCHLORIDE 10 MG: 10 TABLET ORAL at 07:42

## 2018-06-15 ASSESSMENT — PATIENT HEALTH QUESTIONNAIRE - PHQ9
1. LITTLE INTEREST OR PLEASURE IN DOING THINGS: NOT AT ALL
2. FEELING DOWN, DEPRESSED, IRRITABLE, OR HOPELESS: NOT AT ALL
SUM OF ALL RESPONSES TO PHQ9 QUESTIONS 1 AND 2: 0

## 2018-06-15 ASSESSMENT — PAIN SCALES - GENERAL
PAINLEVEL_OUTOF10: 10
PAINLEVEL_OUTOF10: 4
PAINLEVEL_OUTOF10: 5

## 2018-06-15 NOTE — CARE PLAN
Problem: Safety  Goal: Will remain free from injury  Outcome: PROGRESSING AS EXPECTED      Problem: Infection  Goal: Will remain free from infection  Outcome: PROGRESSING AS EXPECTED      Problem: Respiratory:  Goal: Respiratory status will improve  Outcome: PROGRESSING AS EXPECTED      Problem: Pain Management  Goal: Pain level will decrease to patient's comfort goal  Outcome: PROGRESSING AS EXPECTED

## 2018-06-15 NOTE — CARE PLAN
Problem: Safety  Goal: Will remain free from falls  Outcome: PROGRESSING AS EXPECTED  Patient verbalized the need to use the call light whenever getting out of bed.

## 2018-06-15 NOTE — DISCHARGE SUMMARY
Discharge Summary    CHIEF COMPLAINT ON ADMISSION  No chief complaint on file.      Reason for Admission  Respiratory Failure     Admission Date  6/9/2018    CODE STATUS  Full Code    HPI & HOSPITAL COURSE  This is a 32 y.o. male here with  transfer from an outside hospital with respiratory failure, DKA, and hematemesis. Patient is currently intubated and sedated and unable to provide any history therefore history is obtained and review of the medical records. In short, the patient was brought by EMS to an outside hospital with complaints of nausea, vomiting, and elevated blood sugars. He apparently had been off of his home medications for the past 8 days. When he was assessed at the outside facility, he had vomited bright red blood and was noted to be in DKA. He was given 5 L of normal saline and started on insulin. Unfortunately, his mentation apparently worsened and he required emergent intubation prior to transfer to our facility for higher level of care. ABG obtained notable for pH 6.9, PCO2 40.4, bicarbonate 24, sodium 135, potassium 4.5, chloride 99, CO2 8, BUN 20, creatinine 1.5, anion gap 28, sugars 438, WBC 17.3, hemoglobin 17.4, hematocrit 52, platelets 225.  He was admitted to the icu and was seen by pulmonary for acute hypoxic failure,he was also noted to have arf and was given iv fluid and his arf resolved.  Pt developed pneumothorax and had to receive chest tube.his chest tube was dce and pt did well.his hematemesis has resolved without  Any further drop in h/h,his dka has resolved and he was extubated and is doing well and does not need any oxygen.I have spoken to him and recommended  To him to make sure to take his medication.       Therefore, he is discharged in good and stable condition to home with close outpatient follow-up.    The patient met 2-midnight criteria for an inpatient stay at the time of discharge.    Discharge Date  6/15/18    FOLLOW UP ITEMS POST DISCHARGE  pcp this coming  week    DISCHARGE DIAGNOSES  Principal Problem:    DKA (diabetic ketoacidoses) ([facility]) POA: Unknown  Active Problems:    Hematemesis POA: Unknown    Type I diabetes mellitus (HCC) POA: Yes    Acute respiratory failure (HCC) POA: Unknown    Postprocedural pneumothorax POA: Unknown    Electrolyte abnormality POA: Unknown  Resolved Problems:    Leukocytosis POA: Yes      FOLLOW UP  No future appointments.  No follow-up provider specified.    MEDICATIONS ON DISCHARGE     Medication List      START taking these medications      Instructions   tramadol 50 MG Tabs  Commonly known as:  ULTRAM   Take 1 Tab by mouth every 8 hours as needed for up to 5 days.  Dose:  50 mg        CHANGE how you take these medications      Instructions   * gabapentin 300 MG Caps  What changed:  Another medication with the same name was added. Make sure you understand how and when to take each.  Commonly known as:  NEURONTIN   Take 600 mg by mouth 3 times a day.  Dose:  600 mg     * gabapentin 600 MG tablet  What changed:  You were already taking a medication with the same name, and this prescription was added. Make sure you understand how and when to take each.  Commonly known as:  NEURONTIN   Take 1 Tab by mouth 3 times a day.  Dose:  600 mg        * This list has 2 medication(s) that are the same as other medications prescribed for you. Read the directions carefully, and ask your doctor or other care provider to review them with you.            CONTINUE taking these medications      Instructions   hydrOXYzine pamoate 25 MG Caps  Commonly known as:  VISTARIL   Take 1 Cap by mouth 2 times a day as needed for Itching. sleep  Dose:  25 mg     insulin 70/30 (70-30) 100 UNIT/ML Susp  Commonly known as:  HUMULIN,NOVOLIN   Inject 23 Units as instructed 2 Times a Day.  Dose:  23 Units     metoclopramide 10 MG Tabs  Commonly known as:  REGLAN   Take 1 Tab by mouth 3 times a day before meals.  Dose:  10 mg     omeprazole 20 MG delayed-release  capsule  Commonly known as:  PRILOSEC   Take 1 Cap by mouth 2 Times a Day.  Dose:  20 mg     ondansetron 4 MG Tbdp  Commonly known as:  ZOFRAN ODT   Take 1 Tab by mouth every four hours as needed for Nausea/Vomiting.  Dose:  4 mg     potassium chloride SA 20 MEQ Tbcr  Commonly known as:  Kdur   Take 1 Tab by mouth 2 times a day.  Dose:  20 mEq            Allergies  No Known Allergies    DIET  Orders Placed This Encounter   Procedures   • DIET ORDER     Standing Status:   Standing     Number of Occurrences:   1     Order Specific Question:   Diet:     Answer:   Diabetic [3]       ACTIVITY  As tolerated.  Weight bearing as tolerated    CONSULTATIONS  pulmonary    PROCEDURES  Chest tube  Intubation and extubation    LABORATORY  Lab Results   Component Value Date    SODIUM 135 06/15/2018    POTASSIUM 4.1 06/15/2018    CHLORIDE 100 06/15/2018    CO2 29 06/15/2018    GLUCOSE 299 (H) 06/15/2018    BUN 13 06/15/2018    CREATININE 0.64 06/15/2018        Lab Results   Component Value Date    WBC 5.9 06/15/2018    HEMOGLOBIN 11.8 (L) 06/15/2018    HEMATOCRIT 35.3 (L) 06/15/2018    PLATELETCT 248 06/15/2018        Total time of the discharge process exceeds 35 minutes.

## 2018-06-15 NOTE — PROGRESS NOTES
"Patient keeps asking about his belongings that security took when arriving. Upon report I was told that he has a knife and possibly drugs in the bag. Security is to come up and be with patient as he looks through it for things \"for hygiene\". Patient was brought every hygiene product this hospital carries that he would need. Patient still wants to get in his bag. Patient is to be discharged today.  AOX4  YIN without deficit. Patient ambulates often.   Patient on RA tolerating well  Tolerating diet without n/v  Pain localized to old incisions from chest tube. C/D/I  No acute distresses noted otherwise at this time. Bed in lowest and locked position. Call light within reach. Will continue to monitor.    "

## 2018-06-15 NOTE — DISCHARGE INSTRUCTIONS
Discharge Instructions    Discharged to home by taxi with friend. Discharged via walking, hospital escort: Refused.  Special equipment needed: Not Applicable    Be sure to schedule a follow-up appointment with your primary care doctor or any specialists as instructed.     Discharge Plan:   Smoking Cessation Offered: Patient Refused  Influenza Vaccine Indication: Indicated: 9 to 64 years of age    I understand that a diet low in cholesterol, fat, and sodium is recommended for good health. Unless I have been given specific instructions below for another diet, I accept this instruction as my diet prescription.   Other diet: Diabetic      Special Instructions: None    · Is patient discharged on Warfarin / Coumadin?   No     Depression / Suicide Risk    As you are discharged from this [facility] facility, it is important to learn how to keep safe from harming yourself.    Recognize the warning signs:  · Abrupt changes in personality, positive or negative- including increase in energy   · Giving away possessions  · Change in eating patterns- significant weight changes-  positive or negative  · Change in sleeping patterns- unable to sleep or sleeping all the time   · Unwillingness or inability to communicate  · Depression  · Unusual sadness, discouragement and loneliness  · Talk of wanting to die  · Neglect of personal appearance   · Rebelliousness- reckless behavior  · Withdrawal from people/activities they love  · Confusion- inability to concentrate     If you or a loved one observes any of these behaviors or has concerns about self-harm, here's what you can do:  · Talk about it- your feelings and reasons for harming yourself  · Remove any means that you might use to hurt yourself (examples: pills, rope, extension cords, firearm)  · Get professional help from the community (Mental Health, Substance Abuse, psychological counseling)  · Do not be alone:Call your Safe Contact- someone whom you trust who will be there for  you.  · Call your local CRISIS HOTLINE 063-8150 or 586-338-9840  · Call your local Children's Mobile Crisis Response Team [place] (613) 381-9649 or www.[facility]  · Call the toll free National Suicide Prevention Hotlines   · National Suicide Prevention Lifeline 832-334-DHSU (8357)  · National Hope Line Network 800-SUICIDE (057-4779)      Diabetic Ketoacidosis  Diabetic ketoacidosis is a life-threatening complication of diabetes. If it is not treated, it can cause severe dehydration and organ damage and can lead to a coma or death.  What are the causes?  This condition develops when there is not enough of the hormone insulin in the body. Insulin helps the body to break down sugar for energy. Without insulin, the body cannot break down sugar, so it breaks down fats instead. This leads to the production of acids that are called ketones. Ketones are poisonous at high levels.  This condition can be triggered by:  · Stress on the body that is brought on by an illness.  · Medicines that raise blood glucose levels.  · Not taking diabetes medicine.  What are the signs or symptoms?  Symptoms of this condition include:  · Fatigue.  · Weight loss.  · Excessive thirst.  · Light-headedness.  · Fruity or sweet-smelling breath.  · Excessive urination.  · Vision changes.  · Confusion or irritability.  · Nausea.  · Vomiting.  · Rapid breathing.  · Abdominal pain.  · Feeling flushed.  How is this diagnosed?  This condition is diagnosed based on a medical history, a physical exam, and blood tests. You may also have a urine test that checks for ketones.  How is this treated?  This condition may be treated with:  · Fluid replacement. This may be done to correct dehydration.  · Insulin injections. These may be given through the skin or through an IV tube.  · Electrolyte replacement. Electrolytes, such as potassium and sodium, may be given in pill form or through an IV tube.  · Antibiotic medicines. These may be prescribed if  your condition was caused by an infection.  Follow these instructions at home:  Eating and drinking  · Drink enough fluids to keep your urine clear or pale yellow.  · If you cannot eat, alternate between drinking fluids with sugar (such as juice) and salty fluids (such as broth or bouillon).  · If you can eat, follow your usual diet and drink sugar-free liquids, such as water.  Other Instructions   · Take insulin as directed by your health care provider. Do not skip insulin injections. Do not use  insulin.  · If your blood sugar is over 240 mg/dL, monitor your urine ketones every 4-6 hours.  · If you were prescribed an antibiotic medicine, finish all of it even if you start to feel better.  · Rest and exercise only as directed by your health care provider.  · If you get sick, call your health care provider and begin treatment quickly. Your body often needs extra insulin to fight an illness.  · Check your blood glucose levels regularly. If your blood glucose is high, drink plenty of fluids. This helps to flush out ketones.  Contact a health care provider if:  · Your blood glucose level is too high or too low.  · You have ketones in your urine.  · You have a fever.  · You cannot eat.  · You cannot tolerate fluids.  · You have been vomiting for more than 2 hours.  · You continue to have symptoms of this condition.  · You develop new symptoms.  Get help right away if:  · Your blood glucose levels continue to be high (elevated).  · Your monitor reads “high” even when you are taking insulin.  · You faint.  · You have chest pain.  · You have trouble breathing.  · You have a sudden, severe headache.  · You have sudden weakness in one arm or one leg.  · You have sudden trouble speaking or swallowing.  · You have vomiting or diarrhea that gets worse after 3 hours.  · You feel severely fatigued.  · You have trouble thinking.  · You have abdominal pain.  · You are severely dehydrated. Symptoms of severe dehydration  include:  ¨ Extreme thirst.  ¨ Dry mouth.  ¨ Blue lips.  ¨ Cold hands and feet.  ¨ Rapid breathing.  This information is not intended to replace advice given to you by your health care provider. Make sure you discuss any questions you have with your health care provider.  Document Released: 12/15/2001 Document Revised: 05/25/2017 Document Reviewed: 11/25/2015  [employer] Interactive Patient Education © 2017 [employer]

## 2018-06-15 NOTE — PROGRESS NOTES
Received report from am RN Lakshmi at bedside, accepted care . Pt is calm and alert, YIN, equal motor strength, pt  shows no signs of distress. Negative for headache, no N/V, Pt is breathing normally. No SOB, no chest pain. Present bowel sounds and pt passing gas. Bowel protocol in place. Voiding ok, Pain reported at 8/10, No needs at this time. POC discussed. Bed in low position call bell within reach, half side rails up, Bed alarm refused, Pt educated on safety and risks, . Pt resting quietly. Will continue to assess.

## 2018-10-14 ENCOUNTER — HOSPITAL ENCOUNTER (INPATIENT)
Facility: MEDICAL CENTER | Age: 33
LOS: 2 days | DRG: 639 | End: 2018-10-16
Attending: EMERGENCY MEDICINE | Admitting: INTERNAL MEDICINE
Payer: MEDICAID

## 2018-10-14 ENCOUNTER — APPOINTMENT (OUTPATIENT)
Dept: RADIOLOGY | Facility: MEDICAL CENTER | Age: 33
DRG: 639 | End: 2018-10-14
Attending: INTERNAL MEDICINE
Payer: MEDICAID

## 2018-10-14 DIAGNOSIS — E10.65 TYPE 1 DIABETES MELLITUS WITH HYPERGLYCEMIA (HCC): ICD-10-CM

## 2018-10-14 DIAGNOSIS — E10.10 DIABETIC KETOACIDOSIS WITHOUT COMA ASSOCIATED WITH TYPE 1 DIABETES MELLITUS (HCC): ICD-10-CM

## 2018-10-14 PROBLEM — M54.50 ACUTE MIDLINE LOW BACK PAIN WITHOUT SCIATICA: Status: ACTIVE | Noted: 2018-10-14

## 2018-10-14 PROBLEM — Z59.00 HOMELESSNESS: Status: ACTIVE | Noted: 2018-10-14

## 2018-10-14 PROBLEM — R73.9 HYPERGLYCEMIA: Status: ACTIVE | Noted: 2018-10-14

## 2018-10-14 PROBLEM — E10.42 DIABETIC POLYNEUROPATHY ASSOCIATED WITH TYPE 1 DIABETES MELLITUS (HCC): Status: ACTIVE | Noted: 2018-10-14

## 2018-10-14 LAB
ALBUMIN SERPL BCP-MCNC: 4.2 G/DL (ref 3.2–4.9)
ALBUMIN/GLOB SERPL: 1.3 G/DL
ALP SERPL-CCNC: 91 U/L (ref 30–99)
ALT SERPL-CCNC: 14 U/L (ref 2–50)
ANION GAP SERPL CALC-SCNC: 10 MMOL/L (ref 0–11.9)
APPEARANCE UR: CLEAR
AST SERPL-CCNC: 12 U/L (ref 12–45)
B-OH-BUTYR SERPL-MCNC: 0.65 MMOL/L (ref 0.02–0.27)
BASE EXCESS BLDV CALC-SCNC: 2 MMOL/L
BASOPHILS # BLD AUTO: 0.9 % (ref 0–1.8)
BASOPHILS # BLD: 0.05 K/UL (ref 0–0.12)
BILIRUB SERPL-MCNC: 0.6 MG/DL (ref 0.1–1.5)
BILIRUB UR QL STRIP.AUTO: NEGATIVE
BODY TEMPERATURE: ABNORMAL CENTIGRADE
BUN SERPL-MCNC: 23 MG/DL (ref 8–22)
CALCIUM SERPL-MCNC: 9.3 MG/DL (ref 8.5–10.5)
CHLORIDE SERPL-SCNC: 94 MMOL/L (ref 96–112)
CO2 SERPL-SCNC: 26 MMOL/L (ref 20–33)
COLOR UR: YELLOW
CREAT SERPL-MCNC: 1 MG/DL (ref 0.5–1.4)
EOSINOPHIL # BLD AUTO: 0.15 K/UL (ref 0–0.51)
EOSINOPHIL NFR BLD: 2.6 % (ref 0–6.9)
ERYTHROCYTE [DISTWIDTH] IN BLOOD BY AUTOMATED COUNT: 39.6 FL (ref 35.9–50)
EST. AVERAGE GLUCOSE BLD GHB EST-MCNC: 404 MG/DL
GLOBULIN SER CALC-MCNC: 3.3 G/DL (ref 1.9–3.5)
GLUCOSE BLD-MCNC: 270 MG/DL (ref 65–99)
GLUCOSE BLD-MCNC: 332 MG/DL (ref 65–99)
GLUCOSE BLD-MCNC: 490 MG/DL (ref 65–99)
GLUCOSE BLD-MCNC: >600 MG/DL (ref 65–99)
GLUCOSE SERPL-MCNC: 964 MG/DL (ref 65–99)
GLUCOSE UR STRIP.AUTO-MCNC: >=1000 MG/DL
HBA1C MFR BLD: 15.7 % (ref 0–5.6)
HCO3 BLDV-SCNC: 30 MMOL/L (ref 24–28)
HCT VFR BLD AUTO: 45.2 % (ref 42–52)
HGB BLD-MCNC: 14.8 G/DL (ref 14–18)
IMM GRANULOCYTES # BLD AUTO: 0.01 K/UL (ref 0–0.11)
IMM GRANULOCYTES NFR BLD AUTO: 0.2 % (ref 0–0.9)
KETONES UR STRIP.AUTO-MCNC: ABNORMAL MG/DL
LEUKOCYTE ESTERASE UR QL STRIP.AUTO: NEGATIVE
LYMPHOCYTES # BLD AUTO: 1.63 K/UL (ref 1–4.8)
LYMPHOCYTES NFR BLD: 28 % (ref 22–41)
MCH RBC QN AUTO: 30 PG (ref 27–33)
MCHC RBC AUTO-ENTMCNC: 32.7 G/DL (ref 33.7–35.3)
MCV RBC AUTO: 91.5 FL (ref 81.4–97.8)
MICRO URNS: ABNORMAL
MONOCYTES # BLD AUTO: 0.39 K/UL (ref 0–0.85)
MONOCYTES NFR BLD AUTO: 6.7 % (ref 0–13.4)
NEUTROPHILS # BLD AUTO: 3.59 K/UL (ref 1.82–7.42)
NEUTROPHILS NFR BLD: 61.6 % (ref 44–72)
NITRITE UR QL STRIP.AUTO: NEGATIVE
NRBC # BLD AUTO: 0 K/UL
NRBC BLD-RTO: 0 /100 WBC
PCO2 BLDV: 62.7 MMHG (ref 41–51)
PH BLDV: 7.3 [PH] (ref 7.31–7.45)
PH UR STRIP.AUTO: 6.5 [PH]
PLATELET # BLD AUTO: 278 K/UL (ref 164–446)
PMV BLD AUTO: 10.4 FL (ref 9–12.9)
PO2 BLDV: 43.6 MMHG (ref 25–40)
POTASSIUM SERPL-SCNC: 4.6 MMOL/L (ref 3.6–5.5)
PROT SERPL-MCNC: 7.5 G/DL (ref 6–8.2)
PROT UR QL STRIP: NEGATIVE MG/DL
RBC # BLD AUTO: 4.94 M/UL (ref 4.7–6.1)
RBC UR QL AUTO: NEGATIVE
SAO2 % BLDV: 76.3 %
SODIUM SERPL-SCNC: 130 MMOL/L (ref 135–145)
SP GR UR STRIP.AUTO: 1.03
UROBILINOGEN UR STRIP.AUTO-MCNC: 0.2 MG/DL
WBC # BLD AUTO: 5.8 K/UL (ref 4.8–10.8)

## 2018-10-14 PROCEDURE — A9270 NON-COVERED ITEM OR SERVICE: HCPCS | Performed by: INTERNAL MEDICINE

## 2018-10-14 PROCEDURE — 700105 HCHG RX REV CODE 258: Performed by: INTERNAL MEDICINE

## 2018-10-14 PROCEDURE — 700105 HCHG RX REV CODE 258: Performed by: EMERGENCY MEDICINE

## 2018-10-14 PROCEDURE — 99285 EMERGENCY DEPT VISIT HI MDM: CPT

## 2018-10-14 PROCEDURE — 83036 HEMOGLOBIN GLYCOSYLATED A1C: CPT

## 2018-10-14 PROCEDURE — 82803 BLOOD GASES ANY COMBINATION: CPT

## 2018-10-14 PROCEDURE — 99223 1ST HOSP IP/OBS HIGH 75: CPT | Mod: 25 | Performed by: INTERNAL MEDICINE

## 2018-10-14 PROCEDURE — 80053 COMPREHEN METABOLIC PANEL: CPT

## 2018-10-14 PROCEDURE — 72100 X-RAY EXAM L-S SPINE 2/3 VWS: CPT

## 2018-10-14 PROCEDURE — 82962 GLUCOSE BLOOD TEST: CPT

## 2018-10-14 PROCEDURE — 36415 COLL VENOUS BLD VENIPUNCTURE: CPT

## 2018-10-14 PROCEDURE — 770020 HCHG ROOM/CARE - TELE (206)

## 2018-10-14 PROCEDURE — 81003 URINALYSIS AUTO W/O SCOPE: CPT

## 2018-10-14 PROCEDURE — 700102 HCHG RX REV CODE 250 W/ 637 OVERRIDE(OP): Performed by: INTERNAL MEDICINE

## 2018-10-14 PROCEDURE — 99407 BEHAV CHNG SMOKING > 10 MIN: CPT | Performed by: INTERNAL MEDICINE

## 2018-10-14 PROCEDURE — 96372 THER/PROPH/DIAG INJ SC/IM: CPT

## 2018-10-14 PROCEDURE — 85025 COMPLETE CBC W/AUTO DIFF WBC: CPT

## 2018-10-14 PROCEDURE — 96360 HYDRATION IV INFUSION INIT: CPT

## 2018-10-14 PROCEDURE — 82010 KETONE BODYS QUAN: CPT

## 2018-10-14 PROCEDURE — 700102 HCHG RX REV CODE 250 W/ 637 OVERRIDE(OP): Performed by: EMERGENCY MEDICINE

## 2018-10-14 RX ORDER — ACETAMINOPHEN 325 MG/1
650 TABLET ORAL EVERY 6 HOURS PRN
Status: DISCONTINUED | OUTPATIENT
Start: 2018-10-14 | End: 2018-10-16 | Stop reason: HOSPADM

## 2018-10-14 RX ORDER — INSULIN GLARGINE 100 [IU]/ML
30 INJECTION, SOLUTION SUBCUTANEOUS 2 TIMES DAILY
Status: DISCONTINUED | OUTPATIENT
Start: 2018-10-14 | End: 2018-10-15

## 2018-10-14 RX ORDER — INSULIN GLARGINE 100 [IU]/ML
30 INJECTION, SOLUTION SUBCUTANEOUS 2 TIMES DAILY
Status: DISCONTINUED | OUTPATIENT
Start: 2018-10-14 | End: 2018-10-14

## 2018-10-14 RX ORDER — POLYETHYLENE GLYCOL 3350 17 G/17G
1 POWDER, FOR SOLUTION ORAL
Status: DISCONTINUED | OUTPATIENT
Start: 2018-10-14 | End: 2018-10-16 | Stop reason: HOSPADM

## 2018-10-14 RX ORDER — ONDANSETRON 4 MG/1
4 TABLET, ORALLY DISINTEGRATING ORAL EVERY 4 HOURS PRN
Status: DISCONTINUED | OUTPATIENT
Start: 2018-10-14 | End: 2018-10-16 | Stop reason: HOSPADM

## 2018-10-14 RX ORDER — ONDANSETRON 2 MG/ML
4 INJECTION INTRAMUSCULAR; INTRAVENOUS EVERY 4 HOURS PRN
Status: DISCONTINUED | OUTPATIENT
Start: 2018-10-14 | End: 2018-10-16 | Stop reason: HOSPADM

## 2018-10-14 RX ORDER — GABAPENTIN 400 MG/1
1200 CAPSULE ORAL 3 TIMES DAILY
Status: DISCONTINUED | OUTPATIENT
Start: 2018-10-14 | End: 2018-10-15

## 2018-10-14 RX ORDER — GABAPENTIN 600 MG/1
1200 TABLET ORAL 3 TIMES DAILY
Status: ON HOLD | COMMUNITY
End: 2018-10-15

## 2018-10-14 RX ORDER — HEPARIN SODIUM 5000 [USP'U]/ML
5000 INJECTION, SOLUTION INTRAVENOUS; SUBCUTANEOUS EVERY 8 HOURS
Status: DISCONTINUED | OUTPATIENT
Start: 2018-10-15 | End: 2018-10-16 | Stop reason: HOSPADM

## 2018-10-14 RX ORDER — SODIUM CHLORIDE 9 MG/ML
1000 INJECTION, SOLUTION INTRAVENOUS ONCE
Status: COMPLETED | OUTPATIENT
Start: 2018-10-14 | End: 2018-10-14

## 2018-10-14 RX ORDER — BISACODYL 10 MG
10 SUPPOSITORY, RECTAL RECTAL
Status: DISCONTINUED | OUTPATIENT
Start: 2018-10-14 | End: 2018-10-16 | Stop reason: HOSPADM

## 2018-10-14 RX ORDER — HYDROXYZINE HYDROCHLORIDE 25 MG/1
25 TABLET, FILM COATED ORAL 3 TIMES DAILY PRN
Status: DISCONTINUED | OUTPATIENT
Start: 2018-10-14 | End: 2018-10-16 | Stop reason: HOSPADM

## 2018-10-14 RX ORDER — HYDROXYZINE HYDROCHLORIDE 25 MG/1
25 TABLET, FILM COATED ORAL 3 TIMES DAILY PRN
Status: ON HOLD | COMMUNITY
End: 2018-11-28

## 2018-10-14 RX ORDER — INSULIN GLARGINE 100 [IU]/ML
20 INJECTION, SOLUTION SUBCUTANEOUS 2 TIMES DAILY
Status: ON HOLD | COMMUNITY
End: 2018-10-16

## 2018-10-14 RX ORDER — SODIUM CHLORIDE 9 MG/ML
INJECTION, SOLUTION INTRAVENOUS CONTINUOUS
Status: DISCONTINUED | OUTPATIENT
Start: 2018-10-14 | End: 2018-10-15

## 2018-10-14 RX ORDER — DEXTROSE MONOHYDRATE 25 G/50ML
25 INJECTION, SOLUTION INTRAVENOUS
Status: DISCONTINUED | OUTPATIENT
Start: 2018-10-14 | End: 2018-10-16 | Stop reason: HOSPADM

## 2018-10-14 RX ORDER — AMITRIPTYLINE HYDROCHLORIDE 50 MG/1
50 TABLET, FILM COATED ORAL NIGHTLY
Status: ON HOLD | COMMUNITY
End: 2018-10-15

## 2018-10-14 RX ORDER — AMITRIPTYLINE HYDROCHLORIDE 25 MG/1
50 TABLET, FILM COATED ORAL NIGHTLY
Status: DISCONTINUED | OUTPATIENT
Start: 2018-10-14 | End: 2018-10-15

## 2018-10-14 RX ORDER — AMOXICILLIN 250 MG
2 CAPSULE ORAL 2 TIMES DAILY
Status: DISCONTINUED | OUTPATIENT
Start: 2018-10-15 | End: 2018-10-16 | Stop reason: HOSPADM

## 2018-10-14 RX ADMIN — AMITRIPTYLINE HYDROCHLORIDE 50 MG: 25 TABLET, FILM COATED ORAL at 21:21

## 2018-10-14 RX ADMIN — INSULIN HUMAN 3 UNITS: 100 INJECTION, SOLUTION PARENTERAL at 22:08

## 2018-10-14 RX ADMIN — INSULIN GLARGINE 30 UNITS: 100 INJECTION, SOLUTION SUBCUTANEOUS at 20:01

## 2018-10-14 RX ADMIN — SODIUM CHLORIDE: 9 INJECTION, SOLUTION INTRAVENOUS at 21:31

## 2018-10-14 RX ADMIN — ACETAMINOPHEN 650 MG: 325 TABLET, FILM COATED ORAL at 21:21

## 2018-10-14 RX ADMIN — SODIUM CHLORIDE 1000 ML: 9 INJECTION, SOLUTION INTRAVENOUS at 16:00

## 2018-10-14 RX ADMIN — GABAPENTIN 1200 MG: 400 CAPSULE ORAL at 21:20

## 2018-10-14 RX ADMIN — INSULIN HUMAN 7 UNITS: 100 INJECTION, SOLUTION PARENTERAL at 17:05

## 2018-10-14 ASSESSMENT — COGNITIVE AND FUNCTIONAL STATUS - GENERAL
MOBILITY SCORE: 20
EATING MEALS: A LITTLE
HELP NEEDED FOR BATHING: A LITTLE
WALKING IN HOSPITAL ROOM: A LITTLE
PERSONAL GROOMING: A LITTLE
DRESSING REGULAR UPPER BODY CLOTHING: A LITTLE
DRESSING REGULAR LOWER BODY CLOTHING: A LITTLE
STANDING UP FROM CHAIR USING ARMS: A LITTLE
SUGGESTED CMS G CODE MODIFIER DAILY ACTIVITY: CK
MOVING FROM LYING ON BACK TO SITTING ON SIDE OF FLAT BED: A LITTLE
CLIMB 3 TO 5 STEPS WITH RAILING: A LITTLE
DAILY ACTIVITIY SCORE: 18
SUGGESTED CMS G CODE MODIFIER MOBILITY: CJ
TOILETING: A LITTLE

## 2018-10-14 ASSESSMENT — PAIN SCALES - GENERAL
PAINLEVEL_OUTOF10: 5
PAINLEVEL_OUTOF10: 0
PAINLEVEL_OUTOF10: 10

## 2018-10-14 ASSESSMENT — ENCOUNTER SYMPTOMS
ABDOMINAL PAIN: 0
FOCAL WEAKNESS: 0
PALPITATIONS: 0
BACK PAIN: 1
DEPRESSION: 0
LOSS OF CONSCIOUSNESS: 0
WEAKNESS: 1
DOUBLE VISION: 0
BRUISES/BLEEDS EASILY: 0
NAUSEA: 1
MYALGIAS: 0
DIZZINESS: 1
WEIGHT LOSS: 1
SHORTNESS OF BREATH: 0
BLURRED VISION: 0
SPUTUM PRODUCTION: 0
NECK PAIN: 0
HEMOPTYSIS: 0
CHILLS: 0
VOMITING: 1
HEADACHES: 0
FEVER: 0

## 2018-10-14 ASSESSMENT — COPD QUESTIONNAIRES
DURING THE PAST 4 WEEKS HOW MUCH DID YOU FEEL SHORT OF BREATH: NONE/LITTLE OF THE TIME
DO YOU EVER COUGH UP ANY MUCUS OR PHLEGM?: NO/ONLY WITH OCCASIONAL COLDS OR INFECTIONS
COPD SCREENING SCORE: 2
HAVE YOU SMOKED AT LEAST 100 CIGARETTES IN YOUR ENTIRE LIFE: YES

## 2018-10-14 ASSESSMENT — PATIENT HEALTH QUESTIONNAIRE - PHQ9
SUM OF ALL RESPONSES TO PHQ9 QUESTIONS 1 AND 2: 0
2. FEELING DOWN, DEPRESSED, IRRITABLE, OR HOPELESS: NOT AT ALL
1. LITTLE INTEREST OR PLEASURE IN DOING THINGS: NOT AT ALL

## 2018-10-14 ASSESSMENT — LIFESTYLE VARIABLES: EVER_SMOKED: YES

## 2018-10-14 NOTE — ED PROVIDER NOTES
ED Provider Note    Scribed for Dr. Juan C Tohmpson M.D. by Lisa Staton. 10/14/2018  3:36 PM    Primary care provider: Pcp Pt States None  Means of arrival: walk-in  History obtained from: patient  History limited by: none    CHIEF COMPLAINT  Chief Complaint   Patient presents with   • High Blood Sugar       HPI  Paras Lamb is a 32 y.o. male with a history of diabetes, hypertension and seizure who presents to the Emergency Department for evaluation of generalized weakness and fatigue that has been worsening over the last few days. Patient is an insulin dependent diabetic and reports that he has not been managing this over the last 2 days secondary to running out of his medication. Last food intake was 2 days ago and the patient is homeless, reporting extreme hunger at this time. Patient has been drinking excessive water, with increased urine output. Blood sugar measured upon arrival was over 600. No complaints of nausea, vomiting.    REVIEW OF SYSTEMS  Pertinent positives include weakness, fatigue. Pertinent negatives include no nausea, vomiting. As above, all other systems reviewed and are negative.   See HPI for further details.     PAST MEDICAL HISTORY   has a past medical history of Diabetes; Hypertension; and Seizure (CMS-HCC).    SURGICAL HISTORY  patient denies any surgical history    SOCIAL HISTORY  Social History   Substance Use Topics   • Smoking status: Current Every Day Smoker     Packs/day: 0.50     Years: 17.00     Types: Cigarettes   • Alcohol use Yes      Comment: socially      History   Drug Use No     Comment: heroin, meth       FAMILY HISTORY  None noted    CURRENT MEDICATIONS  No current facility-administered medications for this encounter.     Current Outpatient Prescriptions:   •  gabapentin (NEURONTIN) 600 MG tablet, Take 1 Tab by mouth 3 times a day., Disp: 20 Tab, Rfl: 0  •  ondansetron (ZOFRAN ODT) 4 MG TABLET DISPERSIBLE, Take 1 Tab by mouth every four hours as needed for  Nausea/Vomiting., Disp: 20 Tab, Rfl: 0  •  insulin 70/30 (HUMULIN,NOVOLIN) (70-30) 100 UNIT/ML Suspension, Inject 23 Units as instructed 2 Times a Day., Disp: 10 mL, Rfl: 0  •  potassium chloride SA (KDUR) 20 MEQ Tab CR, Take 1 Tab by mouth 2 times a day., Disp: 10 Tab, Rfl: 0  •  omeprazole (PRILOSEC) 20 MG delayed-release capsule, Take 1 Cap by mouth 2 Times a Day., Disp: 60 Cap, Rfl: 0  •  metoclopramide (REGLAN) 10 MG Tab, Take 1 Tab by mouth 3 times a day before meals., Disp: 15 Tab, Rfl: 0  •  hydrOXYzine (VISTARIL) 25 MG Cap, Take 1 Cap by mouth 2 times a day as needed for Itching. sleep, Disp: 20 Cap, Rfl: 0  •  gabapentin (NEURONTIN) 300 MG Cap, Take 600 mg by mouth 3 times a day., Disp: , Rfl:     ALLERGIES  No Known Allergies    PHYSICAL EXAM  VITAL SIGNS: /77   Pulse 93   Temp 36.4 °C (97.5 °F)   Resp 16   Wt 69 kg (152 lb 1.9 oz)   SpO2 97%   BMI 21.83 kg/m²     Constitutional: Well developed, Well nourished, mild distress. Tearful and anxious  HENT: Normocephalic, Atraumatic, Bilateral external ears normal, Oropharynx dry, No oral exudates.   Eyes: PERRLA, EOMI, Conjunctiva normal, No discharge.   Neck: No tenderness, Supple, No stridor.   Lymphatic: No lymphadenopathy noted.   Cardiovascular: Normal heart rate, Normal rhythm.   Thorax & Lungs: Clear to auscultation bilaterally, No respiratory distress, No wheezing, No crackles.   Abdomen: Soft, No tenderness, No masses, No pulsatile masses.   Skin: Warm, Dry, No erythema, No rash.   Extremities:, No edema No cyanosis.   Musculoskeletal: No tenderness to palpation or major deformities noted.  Intact distal pulses  Neurologic: Awake, alert. Moves all extremities spontaneously.  Psychiatric: Affect normal, Judgment normal, Mood normal.     LABS  Results for orders placed or performed during the hospital encounter of 10/14/18   CBC w/ Differential   Result Value Ref Range    WBC 5.8 4.8 - 10.8 K/uL    RBC 4.94 4.70 - 6.10 M/uL    Hemoglobin  14.8 14.0 - 18.0 g/dL    Hematocrit 45.2 42.0 - 52.0 %    MCV 91.5 81.4 - 97.8 fL    MCH 30.0 27.0 - 33.0 pg    MCHC 32.7 (L) 33.7 - 35.3 g/dL    RDW 39.6 35.9 - 50.0 fL    Platelet Count 278 164 - 446 K/uL    MPV 10.4 9.0 - 12.9 fL    Neutrophils-Polys 61.60 44.00 - 72.00 %    Lymphocytes 28.00 22.00 - 41.00 %    Monocytes 6.70 0.00 - 13.40 %    Eosinophils 2.60 0.00 - 6.90 %    Basophils 0.90 0.00 - 1.80 %    Immature Granulocytes 0.20 0.00 - 0.90 %    Nucleated RBC 0.00 /100 WBC    Neutrophils (Absolute) 3.59 1.82 - 7.42 K/uL    Lymphs (Absolute) 1.63 1.00 - 4.80 K/uL    Monos (Absolute) 0.39 0.00 - 0.85 K/uL    Eos (Absolute) 0.15 0.00 - 0.51 K/uL    Baso (Absolute) 0.05 0.00 - 0.12 K/uL    Immature Granulocytes (abs) 0.01 0.00 - 0.11 K/uL    NRBC (Absolute) 0.00 K/uL   Complete Metabolic Panel (CMP)   Result Value Ref Range    Sodium 130 (L) 135 - 145 mmol/L    Potassium 4.6 3.6 - 5.5 mmol/L    Chloride 94 (L) 96 - 112 mmol/L    Co2 26 20 - 33 mmol/L    Anion Gap 10.0 0.0 - 11.9    Glucose 964 (HH) 65 - 99 mg/dL    Bun 23 (H) 8 - 22 mg/dL    Creatinine 1.00 0.50 - 1.40 mg/dL    Calcium 9.3 8.5 - 10.5 mg/dL    AST(SGOT) 12 12 - 45 U/L    ALT(SGPT) 14 2 - 50 U/L    Alkaline Phosphatase 91 30 - 99 U/L    Total Bilirubin 0.6 0.1 - 1.5 mg/dL    Albumin 4.2 3.2 - 4.9 g/dL    Total Protein 7.5 6.0 - 8.2 g/dL    Globulin 3.3 1.9 - 3.5 g/dL    A-G Ratio 1.3 g/dL   BETA-HYDROXYBUTYRIC ACID   Result Value Ref Range    beta-Hydroxybutyric Acid 0.65 (H) 0.02 - 0.27 mmol/L   VENOUS BLOOD GAS   Result Value Ref Range    Venous Bg Ph 7.30 (L) 7.31 - 7.45    Venous Bg Pco2 62.7 (H) 41.0 - 51.0 mmHg    Venous Bg Po2 43.6 (H) 25.0 - 40.0 mmHg    Venous Bg O2 Saturation 76.3 %    Venous Bg Hco3 30 (H) 24 - 28 mmol/L    Venous Bg Base Excess 2 mmol/L    Body Temp see below Centigrade   ESTIMATED GFR   Result Value Ref Range    GFR If African American >60 >60 mL/min/1.73 m 2    GFR If Non African American >60 >60 mL/min/1.73 m 2        All labs reviewed by me.    COURSE & MEDICAL DECISION MAKING  Pertinent Labs & Imaging studies reviewed. (See chart for details)    3:36 PM - Patient seen and examined at bedside. Patient will be treated with IV NS for dehydration and increased blood sugar, and 7 units of insulin. Ordered venous blood gas, CBC, CMP, UA, beta hydroxybutyric acid to evaluate his symptoms. The differential diagnoses include but are not limited to: DKA, dehydration. I informed the patient that we would evaluate for acute processes with lab work, attempt to lower his blood sugar here in the ED. He is requesting a meal at this time. I informed the patient that he would be unable to eat until his blood sugar is normalized. Patient understands and agrees with treatment plan.    5:05 PM Spoke with Dr. Kerr, Hospitalist, about the patient's condition. Agrees to admit the patient.    HYDRATION: Based on the patient's presentation of Dehydration and Hyperglycemia the patient was given IV fluids. IV Hydration was used becasue oral hydration failed due to NPO status, and would not be rapid enough as required. Upon recheck following hydration, the patient was improved with improved hydration and blood sugar level..    Decision Making:  Mr. Lamb is an insulin dependent diabetic who presents today with a blood glucose over 600, for evaluation of worsening generalized weakness and fatigue. Patient's diabetes was unmanaged for the last 2 days secondary to him being out of his regular medication.  He appears to be in very mild DKA, certainly quite hyperglycemic    DISPOSITION:  Patient will be admitted to Dr. Kerr, Hospitalist in guarded condition.    FINAL IMPRESSION  1. Diabetic ketoacidosis without coma associated with type 1 diabetes mellitus (HCC)          Lisa SEGURA (Scrjoey), am scribing for, and in the presence of, Juan C Thompson M.D..    Electronically signed by: Lisa Staton (Edwardo), 10/14/2018    Juan C SEGURA  M.D. personally performed the services described in this documentation, as scribed by Lisa Staton in my presence, and it is both accurate and complete.    The note accurately reflects work and decisions made by me.  Juan C Thompson  10/14/2018  8:46 PM

## 2018-10-14 NOTE — ED TRIAGE NOTES
Pt c/o feeling weak, is drowsy, reports hx of DM2, FSBS in triage >600.  Pt reports last insulin use 4 days ago as he ran out.  Last reported food intake 2 days ago.  Pt is homeless.

## 2018-10-15 PROBLEM — E87.20 ACIDOSIS, METABOLIC: Status: ACTIVE | Noted: 2018-10-15

## 2018-10-15 LAB
ANION GAP SERPL CALC-SCNC: 16 MMOL/L (ref 0–11.9)
ANION GAP SERPL CALC-SCNC: 7 MMOL/L (ref 0–11.9)
BASOPHILS # BLD AUTO: 0.4 % (ref 0–1.8)
BASOPHILS # BLD: 0.03 K/UL (ref 0–0.12)
BUN SERPL-MCNC: 16 MG/DL (ref 8–22)
BUN SERPL-MCNC: 18 MG/DL (ref 8–22)
CALCIUM SERPL-MCNC: 8.4 MG/DL (ref 8.5–10.5)
CALCIUM SERPL-MCNC: 8.8 MG/DL (ref 8.4–10.2)
CHLORIDE SERPL-SCNC: 107 MMOL/L (ref 96–112)
CHLORIDE SERPL-SCNC: 107 MMOL/L (ref 96–112)
CO2 SERPL-SCNC: 19 MMOL/L (ref 20–33)
CO2 SERPL-SCNC: 25 MMOL/L (ref 20–33)
CREAT SERPL-MCNC: 0.75 MG/DL (ref 0.5–1.4)
CREAT SERPL-MCNC: 0.83 MG/DL (ref 0.5–1.4)
EOSINOPHIL # BLD AUTO: 0.22 K/UL (ref 0–0.51)
EOSINOPHIL NFR BLD: 3.1 % (ref 0–6.9)
ERYTHROCYTE [DISTWIDTH] IN BLOOD BY AUTOMATED COUNT: 37.9 FL (ref 35.9–50)
GLUCOSE BLD-MCNC: 110 MG/DL (ref 65–99)
GLUCOSE BLD-MCNC: 136 MG/DL (ref 65–99)
GLUCOSE BLD-MCNC: 193 MG/DL (ref 65–99)
GLUCOSE BLD-MCNC: 391 MG/DL (ref 65–99)
GLUCOSE BLD-MCNC: 72 MG/DL (ref 65–99)
GLUCOSE BLD-MCNC: >600 MG/DL (ref 65–99)
GLUCOSE SERPL-MCNC: 63 MG/DL (ref 65–99)
GLUCOSE SERPL-MCNC: 86 MG/DL (ref 65–99)
HCT VFR BLD AUTO: 40.6 % (ref 42–52)
HGB BLD-MCNC: 13.8 G/DL (ref 14–18)
IMM GRANULOCYTES # BLD AUTO: 0.03 K/UL (ref 0–0.11)
IMM GRANULOCYTES NFR BLD AUTO: 0.4 % (ref 0–0.9)
LYMPHOCYTES # BLD AUTO: 1.84 K/UL (ref 1–4.8)
LYMPHOCYTES NFR BLD: 26.2 % (ref 22–41)
MCH RBC QN AUTO: 30 PG (ref 27–33)
MCHC RBC AUTO-ENTMCNC: 34 G/DL (ref 33.7–35.3)
MCV RBC AUTO: 88.3 FL (ref 81.4–97.8)
MONOCYTES # BLD AUTO: 0.46 K/UL (ref 0–0.85)
MONOCYTES NFR BLD AUTO: 6.6 % (ref 0–13.4)
NEUTROPHILS # BLD AUTO: 4.43 K/UL (ref 1.82–7.42)
NEUTROPHILS NFR BLD: 63.3 % (ref 44–72)
NRBC # BLD AUTO: 0 K/UL
NRBC BLD-RTO: 0 /100 WBC
PLATELET # BLD AUTO: 256 K/UL (ref 164–446)
PMV BLD AUTO: 9.9 FL (ref 9–12.9)
POTASSIUM SERPL-SCNC: 3.5 MMOL/L (ref 3.6–5.5)
POTASSIUM SERPL-SCNC: 3.9 MMOL/L (ref 3.6–5.5)
RBC # BLD AUTO: 4.6 M/UL (ref 4.7–6.1)
SODIUM SERPL-SCNC: 139 MMOL/L (ref 135–145)
SODIUM SERPL-SCNC: 142 MMOL/L (ref 135–145)
WBC # BLD AUTO: 7 K/UL (ref 4.8–10.8)

## 2018-10-15 PROCEDURE — 82962 GLUCOSE BLOOD TEST: CPT

## 2018-10-15 PROCEDURE — 700105 HCHG RX REV CODE 258: Performed by: HOSPITALIST

## 2018-10-15 PROCEDURE — 770020 HCHG ROOM/CARE - TELE (206)

## 2018-10-15 PROCEDURE — 700111 HCHG RX REV CODE 636 W/ 250 OVERRIDE (IP): Performed by: INTERNAL MEDICINE

## 2018-10-15 PROCEDURE — 700105 HCHG RX REV CODE 258: Performed by: INTERNAL MEDICINE

## 2018-10-15 PROCEDURE — 36415 COLL VENOUS BLD VENIPUNCTURE: CPT

## 2018-10-15 PROCEDURE — 99233 SBSQ HOSP IP/OBS HIGH 50: CPT | Performed by: HOSPITALIST

## 2018-10-15 PROCEDURE — A9270 NON-COVERED ITEM OR SERVICE: HCPCS | Performed by: INTERNAL MEDICINE

## 2018-10-15 PROCEDURE — 85025 COMPLETE CBC W/AUTO DIFF WBC: CPT

## 2018-10-15 PROCEDURE — 80048 BASIC METABOLIC PNL TOTAL CA: CPT | Mod: 91

## 2018-10-15 PROCEDURE — 700102 HCHG RX REV CODE 250 W/ 637 OVERRIDE(OP): Performed by: HOSPITALIST

## 2018-10-15 PROCEDURE — 700102 HCHG RX REV CODE 250 W/ 637 OVERRIDE(OP): Performed by: INTERNAL MEDICINE

## 2018-10-15 RX ORDER — GABAPENTIN 300 MG/1
600 CAPSULE ORAL 3 TIMES DAILY
Status: ON HOLD | COMMUNITY
End: 2018-11-28

## 2018-10-15 RX ORDER — INSULIN GLARGINE 100 [IU]/ML
25 INJECTION, SOLUTION SUBCUTANEOUS 2 TIMES DAILY
Status: DISCONTINUED | OUTPATIENT
Start: 2018-10-15 | End: 2018-10-16 | Stop reason: HOSPADM

## 2018-10-15 RX ORDER — AMITRIPTYLINE HYDROCHLORIDE 25 MG/1
25 TABLET, FILM COATED ORAL NIGHTLY
Status: ON HOLD | COMMUNITY
End: 2018-11-28

## 2018-10-15 RX ORDER — SODIUM CHLORIDE 9 MG/ML
INJECTION, SOLUTION INTRAVENOUS CONTINUOUS
Status: DISCONTINUED | OUTPATIENT
Start: 2018-10-15 | End: 2018-10-16 | Stop reason: HOSPADM

## 2018-10-15 RX ORDER — GABAPENTIN 300 MG/1
600 CAPSULE ORAL 3 TIMES DAILY
Status: DISCONTINUED | OUTPATIENT
Start: 2018-10-15 | End: 2018-10-16 | Stop reason: HOSPADM

## 2018-10-15 RX ORDER — AMITRIPTYLINE HYDROCHLORIDE 25 MG/1
25 TABLET, FILM COATED ORAL NIGHTLY
Status: DISCONTINUED | OUTPATIENT
Start: 2018-10-15 | End: 2018-10-16 | Stop reason: HOSPADM

## 2018-10-15 RX ADMIN — GABAPENTIN 600 MG: 300 CAPSULE ORAL at 11:22

## 2018-10-15 RX ADMIN — INSULIN GLARGINE 25 UNITS: 100 INJECTION, SOLUTION SUBCUTANEOUS at 18:26

## 2018-10-15 RX ADMIN — SODIUM CHLORIDE: 9 INJECTION, SOLUTION INTRAVENOUS at 14:00

## 2018-10-15 RX ADMIN — SODIUM CHLORIDE: 9 INJECTION, SOLUTION INTRAVENOUS at 23:29

## 2018-10-15 RX ADMIN — ACETAMINOPHEN 650 MG: 325 TABLET, FILM COATED ORAL at 05:08

## 2018-10-15 RX ADMIN — SODIUM CHLORIDE: 9 INJECTION, SOLUTION INTRAVENOUS at 10:22

## 2018-10-15 RX ADMIN — GABAPENTIN 1200 MG: 400 CAPSULE ORAL at 05:08

## 2018-10-15 RX ADMIN — STANDARDIZED SENNA CONCENTRATE AND DOCUSATE SODIUM 2 TABLET: 8.6; 5 TABLET ORAL at 18:27

## 2018-10-15 RX ADMIN — INSULIN HUMAN 5 UNITS: 100 INJECTION, SOLUTION PARENTERAL at 20:33

## 2018-10-15 RX ADMIN — SODIUM CHLORIDE: 9 INJECTION, SOLUTION INTRAVENOUS at 03:12

## 2018-10-15 RX ADMIN — HEPARIN SODIUM 5000 UNITS: 5000 INJECTION, SOLUTION INTRAVENOUS; SUBCUTANEOUS at 11:23

## 2018-10-15 RX ADMIN — HEPARIN SODIUM 5000 UNITS: 5000 INJECTION, SOLUTION INTRAVENOUS; SUBCUTANEOUS at 20:41

## 2018-10-15 RX ADMIN — INSULIN GLARGINE 30 UNITS: 100 INJECTION, SOLUTION SUBCUTANEOUS at 05:06

## 2018-10-15 RX ADMIN — HEPARIN SODIUM 5000 UNITS: 5000 INJECTION, SOLUTION INTRAVENOUS; SUBCUTANEOUS at 05:08

## 2018-10-15 RX ADMIN — INSULIN HUMAN 1 UNITS: 100 INJECTION, SOLUTION PARENTERAL at 18:26

## 2018-10-15 RX ADMIN — GABAPENTIN 600 MG: 300 CAPSULE ORAL at 18:27

## 2018-10-15 RX ADMIN — AMITRIPTYLINE HYDROCHLORIDE 25 MG: 25 TABLET, FILM COATED ORAL at 20:41

## 2018-10-15 ASSESSMENT — ENCOUNTER SYMPTOMS
DIARRHEA: 0
HEADACHES: 0
ABDOMINAL PAIN: 0
FEVER: 0
DIZZINESS: 0
CHILLS: 0
PALPITATIONS: 0
VOMITING: 0

## 2018-10-15 ASSESSMENT — PATIENT HEALTH QUESTIONNAIRE - PHQ9
1. LITTLE INTEREST OR PLEASURE IN DOING THINGS: NOT AT ALL
2. FEELING DOWN, DEPRESSED, IRRITABLE, OR HOPELESS: NOT AT ALL
SUM OF ALL RESPONSES TO PHQ9 QUESTIONS 1 AND 2: 0
2. FEELING DOWN, DEPRESSED, IRRITABLE, OR HOPELESS: NOT AT ALL
SUM OF ALL RESPONSES TO PHQ9 QUESTIONS 1 AND 2: 0
1. LITTLE INTEREST OR PLEASURE IN DOING THINGS: NOT AT ALL

## 2018-10-15 ASSESSMENT — PAIN SCALES - GENERAL: PAINLEVEL_OUTOF10: 0

## 2018-10-15 NOTE — CARE PLAN
Problem: Safety  Goal: Will remain free from injury  Outcome: PROGRESSING SLOWER THAN EXPECTED  Patient educated on maintaining safety in hospital with unfamiliar environment, no evidence of learning r/t to lethargy.     Problem: Knowledge Deficit  Goal: Knowledge of disease process/condition, treatment plan, diagnostic tests, and medications will improve  Outcome: PROGRESSING SLOWER THAN EXPECTED  Patient educated on importance of taking insulin r/t to diabetes. No evidence of learning r/t lethargy.
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
## 2018-10-15 NOTE — PROGRESS NOTES
Med rec updated and complete  Allergies reviewed  Pt was not sure what the doses of his medications.  Called [facility] @ [phone], pt last  all his medications on 6/2018 for 30 day supply, pt reports that it has been 2 days since he took them and ran out.  Pt last picked up ADMELOG 100units/ml and BASAGLAR 100units/ml on 9/5/2018 for 30 day supply.  Pt and pharmacy reports no antibiotics in the last 30 days.  Pt reports no vitamins or OTC's.

## 2018-10-15 NOTE — CARE PLAN
Problem: Communication  Goal: The ability to communicate needs accurately and effectively will improve  Outcome: PROGRESSING SLOWER THAN EXPECTED  Pt is very lethargic today and is uncooperative - pt does not demonstrate a desire to participate in care.     Problem: Safety  Goal: Will remain free from falls  Outcome: PROGRESSING AS EXPECTED  Standard fall risk precautions in place. Pt is able to call for assistance as needed.     Problem: Pain Management  Goal: Pain level will decrease to patient's comfort goal  Outcome: PROGRESSING AS EXPECTED  Pt denies pain at this time. Pt is able to rate pain on a 0-10 scale.

## 2018-10-15 NOTE — PROGRESS NOTES
Bedside report received from NOC CHARIS Jain, pt is lethargic but arousable to voice with NS running in R AC IV @ 175 mL/hr. Bed is locked in lowest position with call light, belongings within reach, white board updated, and POC discussed. All needs met at this time.

## 2018-10-15 NOTE — DISCHARGE PLANNING
Anticipated Discharge Disposition: D/C to Home/Self-Care    Action: LSW attempted to meet pt at bedside to provide community resources, however, pt was asleep when LSW arrived and did not wake-up when called by name. LSW left a referral for the [employer] program and a packet of community resources on pt's table in his room.     Barriers to Discharge: None.    Plan: No further d/c planning needs at this time.

## 2018-10-15 NOTE — ASSESSMENT & PLAN NOTE
Patient developing high anion gap acidosis despite sugars now under good control  Repeat BMP ordered at 2 PM

## 2018-10-15 NOTE — PROGRESS NOTES
Monitor summary HR is 75-85 normal EKG, normal sinus rhythm, unchanged from previous tracings  0.16/0.08/0.32.

## 2018-10-15 NOTE — ASSESSMENT & PLAN NOTE
-not well controlled, Glucose almost 1000 in the ER, but no clear DKA  Blood sugars improved with Lantus 30 units twice daily and IV fluids  Glycohemoglobin 15.7  Patient's blood sugars on the lower end, will reduce Lantus dose to 25 units twice daily

## 2018-10-15 NOTE — ASSESSMENT & PLAN NOTE
-SW spoke to patient  Patient provided resources for food and shelter  Patient already has referral for food pantry but does not utilize this

## 2018-10-15 NOTE — DISCHARGE PLANNING
Anticipated Discharge Disposition: D/C to Home/Self-Care    Action: Pt is currently homeless, has few community resources, and has uncontrolled diabetes. Pt's notes reflect he has not eaten in 3 days due to a lack of food. Pt is established with [facility] ([facility] for primary care and pharmacy services.    Pt's chart is currently showing coverage through [facility] with a secondary of Medicaid HMO. LSW e-mailed Patient Financial Assistance to clarify pt's insurance coverage.     Barriers to Discharge: None.    Plan: LSW to meet with pt and make appropriate community referrals once clarification on insurance coverage is received.

## 2018-10-15 NOTE — ED NOTES
Pt is not sure the names of his medications, will have to call [facility] tomorrow (10/15/2018)

## 2018-10-15 NOTE — PROGRESS NOTES
2 RN skin check completed with Morgan RN. Skin intact, patient able to shift weight in bed. No other skin breakdown noted.

## 2018-10-15 NOTE — DIETARY
"Nutrition services: Day 1 of admit.  Paras Lamb is a 32 y.o. male with admitting DX of hyperglycemia w/ known past medical hx significant for DM-type I, chronic homelessness, and multiple admissions for DKA per H&P. Per nutrition admit screen - pt with inadequate PO intake prior to admit. Attempted to speak to pt; however, was inattentive and would go in and out of sleep. Therefore, per chart review, pt reports that he does not have access to food 2' currently homeless; therefore, has not been eating because of it. Social work meeting w/ pt to provide services.     Assessment:  Height: 177.8 cm (5' 10\")  Weight: 65.5 kg (144 lb 6.4 oz)  Body mass index is 20.72 kg/m².   Diet/Intake: Diabetic/ no documented PO and pt unable to answer.     Evaluation:   1. Pt w/ DM and is homeless has been unable to have access to food regularly.   2. Last BM PTA.   3. Skin intact- no documented wounds per RN.     Pt at risk for malnutrition 2' ongoing poor PO as evidence by homeless and unable to have consistent access to meals.     Recommendations/Plan:   1. Encourage intake of meals.   2. Document intake as % taken in ADL's to provide interdisciplinary communication across all shifts.   3. Monitor weight.  4. Nutrition rep will continue to see patient for ongoing meal and snack preferences.     "

## 2018-10-15 NOTE — PROGRESS NOTES
"Notified Dr Carlton in morning rounds (0945) of patient's state - lethargy/obtunded. MD at bedside to assess patient. Pt was slightly more awake to respond. MD gave verbal order to get patient up to the chair in an hour to assist with patient's LOC.     Approx one hour later, this RN attempted to get patient to sit up in chair. However, pt refused stating, \"There's nothing wrong with sleeping. Leave me alone. I'm not getting up.\" This RN notified MD and will continue to monitor. VSS.   "

## 2018-10-15 NOTE — PROGRESS NOTES
Report given in ED, patient arrived to room T812-1. Patient lethargic, arousing to voice, A&Ox4, VSS.

## 2018-10-15 NOTE — PROGRESS NOTES
Patient sleeping; no signs of distress. No change from previous assessment. Safety maintained. Will continue to monitor.  Patient remains lethargic, arousable to voice A&Ox4.

## 2018-10-16 ENCOUNTER — PATIENT OUTREACH (OUTPATIENT)
Dept: HEALTH INFORMATION MANAGEMENT | Facility: OTHER | Age: 33
End: 2018-10-16

## 2018-10-16 VITALS
DIASTOLIC BLOOD PRESSURE: 90 MMHG | BODY MASS INDEX: 22.22 KG/M2 | WEIGHT: 155.2 LBS | HEIGHT: 70 IN | OXYGEN SATURATION: 98 % | SYSTOLIC BLOOD PRESSURE: 128 MMHG | TEMPERATURE: 98 F | RESPIRATION RATE: 15 BRPM | HEART RATE: 83 BPM

## 2018-10-16 LAB
ANION GAP SERPL CALC-SCNC: 11 MMOL/L (ref 0–11.9)
BUN SERPL-MCNC: 15 MG/DL (ref 8–22)
CALCIUM SERPL-MCNC: 8.5 MG/DL (ref 8.5–10.5)
CHLORIDE SERPL-SCNC: 108 MMOL/L (ref 96–112)
CO2 SERPL-SCNC: 22 MMOL/L (ref 20–33)
CREAT SERPL-MCNC: 0.63 MG/DL (ref 0.5–1.4)
GLUCOSE BLD-MCNC: 199 MG/DL (ref 65–99)
GLUCOSE SERPL-MCNC: 120 MG/DL (ref 65–99)
POTASSIUM SERPL-SCNC: 3.2 MMOL/L (ref 3.6–5.5)
SODIUM SERPL-SCNC: 141 MMOL/L (ref 135–145)

## 2018-10-16 PROCEDURE — A9270 NON-COVERED ITEM OR SERVICE: HCPCS | Performed by: INTERNAL MEDICINE

## 2018-10-16 PROCEDURE — 700102 HCHG RX REV CODE 250 W/ 637 OVERRIDE(OP): Performed by: INTERNAL MEDICINE

## 2018-10-16 PROCEDURE — 700102 HCHG RX REV CODE 250 W/ 637 OVERRIDE(OP): Performed by: HOSPITALIST

## 2018-10-16 PROCEDURE — 82962 GLUCOSE BLOOD TEST: CPT

## 2018-10-16 PROCEDURE — 99239 HOSP IP/OBS DSCHRG MGMT >30: CPT | Performed by: INTERNAL MEDICINE

## 2018-10-16 PROCEDURE — 700111 HCHG RX REV CODE 636 W/ 250 OVERRIDE (IP): Performed by: INTERNAL MEDICINE

## 2018-10-16 PROCEDURE — 36415 COLL VENOUS BLD VENIPUNCTURE: CPT

## 2018-10-16 PROCEDURE — 80048 BASIC METABOLIC PNL TOTAL CA: CPT

## 2018-10-16 RX ORDER — POTASSIUM CHLORIDE 20 MEQ/1
40 TABLET, EXTENDED RELEASE ORAL DAILY
Qty: 14 TAB | Refills: 0 | Status: SHIPPED | OUTPATIENT
Start: 2018-10-16 | End: 2018-10-22

## 2018-10-16 RX ORDER — POTASSIUM CHLORIDE 20 MEQ/1
40 TABLET, EXTENDED RELEASE ORAL DAILY
Status: DISCONTINUED | OUTPATIENT
Start: 2018-10-16 | End: 2018-10-16 | Stop reason: HOSPADM

## 2018-10-16 RX ORDER — INSULIN GLARGINE 100 [IU]/ML
25 INJECTION, SOLUTION SUBCUTANEOUS 2 TIMES DAILY
Qty: 10 PEN | Refills: 3 | Status: SHIPPED | OUTPATIENT
Start: 2018-10-16 | End: 2018-11-15

## 2018-10-16 RX ADMIN — POTASSIUM CHLORIDE 40 MEQ: 1500 TABLET, EXTENDED RELEASE ORAL at 09:39

## 2018-10-16 RX ADMIN — STANDARDIZED SENNA CONCENTRATE AND DOCUSATE SODIUM 2 TABLET: 8.6; 5 TABLET ORAL at 06:09

## 2018-10-16 RX ADMIN — GABAPENTIN 600 MG: 300 CAPSULE ORAL at 06:09

## 2018-10-16 RX ADMIN — INSULIN HUMAN 1 UNITS: 100 INJECTION, SOLUTION PARENTERAL at 06:20

## 2018-10-16 RX ADMIN — HEPARIN SODIUM 5000 UNITS: 5000 INJECTION, SOLUTION INTRAVENOUS; SUBCUTANEOUS at 06:09

## 2018-10-16 RX ADMIN — INSULIN GLARGINE 25 UNITS: 100 INJECTION, SOLUTION SUBCUTANEOUS at 09:40

## 2018-10-16 ASSESSMENT — PATIENT HEALTH QUESTIONNAIRE - PHQ9
2. FEELING DOWN, DEPRESSED, IRRITABLE, OR HOPELESS: NOT AT ALL
SUM OF ALL RESPONSES TO PHQ9 QUESTIONS 1 AND 2: 0
1. LITTLE INTEREST OR PLEASURE IN DOING THINGS: NOT AT ALL

## 2018-10-16 ASSESSMENT — PAIN SCALES - GENERAL: PAINLEVEL_OUTOF10: 0

## 2018-10-16 NOTE — PROGRESS NOTES
Monitor summary:    Sinus rhythm   0.20/0.08/0.40  74-90 bpm  Rare PVCs, bigeminy, trigeminy, and couplets, and PACs

## 2018-10-16 NOTE — DISCHARGE PLANNING
Anticipated Discharge Disposition: Shelter    Action: Pt decided he would like to d/c to the [facility] shelter. By has Medicaid and should have transportation services through his insurance. CCA will call insurance to verify eligibility and also arrange taxi if pt is eligible to the [facility].    Barriers to Discharge: None    Plan: [facility] to call [facility] to arrange ride to the shelter.

## 2018-10-16 NOTE — DISCHARGE PLANNING
Anticipated Discharge Disposition: Shelter    Action: GAURI Joyce informed LSW that pt will be picked up by [facility] between 1030 and 1045. LSW informed bedside RN.     Barriers to Discharge: None    Plan: No further discharge needs at this time.
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
## 2018-10-16 NOTE — DISCHARGE PLANNING
Agency/Facility Name: [facility]  Spoke To: Diana  Outcome: Transportation to [address] [place] [address] [facility] [id number].

## 2018-10-16 NOTE — PROGRESS NOTES
Pt leaving the floor via wheelchair with nursing staff. Pt will be transported via medicaid transport to homeless shelter. Pt verbally acknowledges all discharge instructions, medications, and medications regimen. Pt given information on how to get medications, food vouchers, social work resources, diabetic diet information and importance of medication compliance. Pt gathered all personal belongings, Tele box and IV have been removed. All questions and needs have been met at this time.

## 2018-10-16 NOTE — PROGRESS NOTES
Spoke with Social Work regarding patient's dispo after discharge. Pt wishes to go to a homeless shelter. Social work will set up ride to shelter via medicaid.

## 2018-10-16 NOTE — CARE PLAN
Problem: Communication  Goal: The ability to communicate needs accurately and effectively will improve  Outcome: MET Date Met: 10/16/18      Problem: Safety  Goal: Will remain free from injury  Outcome: MET Date Met: 10/16/18    Goal: Will remain free from falls  Outcome: MET Date Met: 10/16/18      Problem: Infection  Goal: Will remain free from infection  Outcome: MET Date Met: 10/16/18      Problem: Venous Thromboembolism (VTW)/Deep Vein Thrombosis (DVT) Prevention:  Goal: Patient will participate in Venous Thrombosis (VTE)/Deep Vein Thrombosis (DVT)Prevention Measures  Outcome: MET Date Met: 10/16/18      Problem: Bowel/Gastric:  Goal: Normal bowel function is maintained or improved  Outcome: MET Date Met: 10/16/18    Goal: Will not experience complications related to bowel motility  Outcome: MET Date Met: 10/16/18      Problem: Knowledge Deficit  Goal: Knowledge of disease process/condition, treatment plan, diagnostic tests, and medications will improve  Outcome: MET Date Met: 10/16/18  Pt educated multiple times throughout hospital stay regarding the importance of compliance with insulin medication.   Goal: Knowledge of the prescribed therapeutic regimen will improve  Outcome: MET Date Met: 10/16/18      Problem: Discharge Barriers/Planning  Goal: Patient's continuum of care needs will be met  Outcome: MET Date Met: 10/16/18  Pt will leave via ride to homeless shelter.     Problem: Pain Management  Goal: Pain level will decrease to patient's comfort goal  Outcome: MET Date Met: 10/16/18

## 2018-10-16 NOTE — PROGRESS NOTES
Bedside report received from ZAYRA Forrest, pt is sleeping with NS running @ 75 mL/hr. Bed is locked in lowest position with call light, belongings within reach, white board updated. All needs met at this time.

## 2018-10-16 NOTE — DISCHARGE INSTRUCTIONS
Discharge Instructions    Discharged to other by taxi with self. Discharged via wheelchair, hospital escort: Yes.  Special equipment needed: Not Applicable    Be sure to schedule a follow-up appointment with your primary care doctor or any specialists as instructed.     Discharge Plan:   Diet Plan: Discussed  Activity Level: Discussed  Confirmed Follow up Appointment: Patient to Call and Schedule Appointment (set up with PCP)  Confirmed Symptoms Management: Discussed  Medication Reconciliation Updated: Yes  Influenza Vaccine Indication: Not indicated: Previously immunized this influenza season and > 8 years of age    I understand that a diet low in cholesterol, fat, and sodium is recommended for good health. Unless I have been given specific instructions below for another diet, I accept this instruction as my diet prescription.   Other diet: Diabetic diet    Special Instructions: None    · Is patient discharged on Warfarin / Coumadin?   No     Depression / Suicide Risk    As you are discharged from this [facility] facility, it is important to learn how to keep safe from harming yourself.    Recognize the warning signs:  · Abrupt changes in personality, positive or negative- including increase in energy   · Giving away possessions  · Change in eating patterns- significant weight changes-  positive or negative  · Change in sleeping patterns- unable to sleep or sleeping all the time   · Unwillingness or inability to communicate  · Depression  · Unusual sadness, discouragement and loneliness  · Talk of wanting to die  · Neglect of personal appearance   · Rebelliousness- reckless behavior  · Withdrawal from people/activities they love  · Confusion- inability to concentrate     If you or a loved one observes any of these behaviors or has concerns about self-harm, here's what you can do:  · Talk about it- your feelings and reasons for harming yourself  · Remove any means that you might use to hurt yourself (examples:  pills, rope, extension cords, firearm)  · Get professional help from the community (Mental Health, Substance Abuse, psychological counseling)  · Do not be alone:Call your Safe Contact- someone whom you trust who will be there for you.  · Call your local CRISIS HOTLINE 873-5955 or 309-078-3117  · Call your local Children's Mobile Crisis Response Team [place] (900) 583-2058 or www.Pulsar  · Call the toll free National Suicide Prevention Hotlines   · National Suicide Prevention Lifeline 920-271-UXLH (1924)  · National Hope Line Network 800-SUICIDE (474-0076)

## 2018-10-16 NOTE — CARE PLAN
"Problem: Infection  Goal: Will remain free from infection  Outcome: PROGRESSING AS EXPECTED  Pt displays no s/s of infection at this time. Monitoring labs, vitals, etc.     Problem: Venous Thromboembolism (VTW)/Deep Vein Thrombosis (DVT) Prevention:  Goal: Patient will participate in Venous Thrombosis (VTE)/Deep Vein Thrombosis (DVT)Prevention Measures  Outcome: PROGRESSING AS EXPECTED  Pt receiving heparin subq for VTE prophylaxis. Pt refusing ambulation at this time, states he is \"too tired\" despite sleeping all night and all morning.       "

## 2018-10-22 ENCOUNTER — HOSPITAL ENCOUNTER (INPATIENT)
Facility: MEDICAL CENTER | Age: 33
LOS: 3 days | DRG: 638 | End: 2018-10-25
Attending: EMERGENCY MEDICINE | Admitting: HOSPITALIST
Payer: MEDICAID

## 2018-10-22 LAB
AMPHET UR QL SCN: NEGATIVE
ANION GAP SERPL CALC-SCNC: 8 MMOL/L (ref 0–11.9)
ANION GAP SERPL CALC-SCNC: 9 MMOL/L (ref 0–11.9)
APAP SERPL-MCNC: <10 UG/ML (ref 10–30)
BARBITURATES UR QL SCN: NEGATIVE
BASE EXCESS BLDV CALC-SCNC: -3 MMOL/L
BASOPHILS # BLD AUTO: 0.4 % (ref 0–1.8)
BASOPHILS # BLD: 0.03 K/UL (ref 0–0.12)
BENZODIAZ UR QL SCN: NEGATIVE
BODY TEMPERATURE: ABNORMAL CENTIGRADE
BUN SERPL-MCNC: 13 MG/DL (ref 8–22)
BUN SERPL-MCNC: 17 MG/DL (ref 8–22)
BZE UR QL SCN: NEGATIVE
CALCIUM SERPL-MCNC: 8.8 MG/DL (ref 8.5–10.5)
CALCIUM SERPL-MCNC: 9 MG/DL (ref 8.5–10.5)
CANNABINOIDS UR QL SCN: NEGATIVE
CHLORIDE SERPL-SCNC: 103 MMOL/L (ref 96–112)
CHLORIDE SERPL-SCNC: 94 MMOL/L (ref 96–112)
CO2 SERPL-SCNC: 22 MMOL/L (ref 20–33)
CO2 SERPL-SCNC: 26 MMOL/L (ref 20–33)
CREAT SERPL-MCNC: 0.73 MG/DL (ref 0.5–1.4)
CREAT SERPL-MCNC: 0.98 MG/DL (ref 0.5–1.4)
EOSINOPHIL # BLD AUTO: 0.15 K/UL (ref 0–0.51)
EOSINOPHIL NFR BLD: 1.9 % (ref 0–6.9)
ERYTHROCYTE [DISTWIDTH] IN BLOOD BY AUTOMATED COUNT: 40.5 FL (ref 35.9–50)
ETHANOL BLD-MCNC: 0 G/DL
GLUCOSE BLD-MCNC: 284 MG/DL (ref 65–99)
GLUCOSE SERPL-MCNC: 416 MG/DL (ref 65–99)
GLUCOSE SERPL-MCNC: 817 MG/DL (ref 65–99)
HCO3 BLDV-SCNC: 23 MMOL/L (ref 24–28)
HCT VFR BLD AUTO: 41.9 % (ref 42–52)
HGB BLD-MCNC: 14 G/DL (ref 14–18)
IMM GRANULOCYTES # BLD AUTO: 0.03 K/UL (ref 0–0.11)
IMM GRANULOCYTES NFR BLD AUTO: 0.4 % (ref 0–0.9)
LYMPHOCYTES # BLD AUTO: 1.28 K/UL (ref 1–4.8)
LYMPHOCYTES NFR BLD: 16.1 % (ref 22–41)
MAGNESIUM SERPL-MCNC: 1.9 MG/DL (ref 1.5–2.5)
MCH RBC QN AUTO: 30.1 PG (ref 27–33)
MCHC RBC AUTO-ENTMCNC: 33.4 G/DL (ref 33.7–35.3)
MCV RBC AUTO: 90.1 FL (ref 81.4–97.8)
METHADONE UR QL SCN: NEGATIVE
MONOCYTES # BLD AUTO: 0.53 K/UL (ref 0–0.85)
MONOCYTES NFR BLD AUTO: 6.7 % (ref 0–13.4)
NEUTROPHILS # BLD AUTO: 5.92 K/UL (ref 1.82–7.42)
NEUTROPHILS NFR BLD: 74.5 % (ref 44–72)
NRBC # BLD AUTO: 0 K/UL
NRBC BLD-RTO: 0 /100 WBC
OPIATES UR QL SCN: POSITIVE
OXYCODONE UR QL SCN: NEGATIVE
PCO2 BLDV: 42.3 MMHG (ref 41–51)
PCP UR QL SCN: NEGATIVE
PH BLDV: 7.35 [PH] (ref 7.31–7.45)
PHOSPHATE SERPL-MCNC: 3.9 MG/DL (ref 2.5–4.5)
PLATELET # BLD AUTO: 289 K/UL (ref 164–446)
PMV BLD AUTO: 10 FL (ref 9–12.9)
PO2 BLDV: 64 MMHG (ref 25–40)
POTASSIUM SERPL-SCNC: 3.9 MMOL/L (ref 3.6–5.5)
POTASSIUM SERPL-SCNC: 4.6 MMOL/L (ref 3.6–5.5)
PROPOXYPH UR QL SCN: NEGATIVE
RBC # BLD AUTO: 4.65 M/UL (ref 4.7–6.1)
SALICYLATES SERPL-MCNC: 0 MG/DL (ref 15–25)
SAO2 % BLDV: 92 %
SODIUM SERPL-SCNC: 128 MMOL/L (ref 135–145)
SODIUM SERPL-SCNC: 134 MMOL/L (ref 135–145)
WBC # BLD AUTO: 7.9 K/UL (ref 4.8–10.8)

## 2018-10-22 PROCEDURE — 700105 HCHG RX REV CODE 258: Performed by: EMERGENCY MEDICINE

## 2018-10-22 PROCEDURE — 94760 N-INVAS EAR/PLS OXIMETRY 1: CPT

## 2018-10-22 PROCEDURE — 84100 ASSAY OF PHOSPHORUS: CPT

## 2018-10-22 PROCEDURE — 82803 BLOOD GASES ANY COMBINATION: CPT

## 2018-10-22 PROCEDURE — 99285 EMERGENCY DEPT VISIT HI MDM: CPT

## 2018-10-22 PROCEDURE — 93005 ELECTROCARDIOGRAM TRACING: CPT | Performed by: EMERGENCY MEDICINE

## 2018-10-22 PROCEDURE — 82962 GLUCOSE BLOOD TEST: CPT

## 2018-10-22 PROCEDURE — 770006 HCHG ROOM/CARE - MED/SURG/GYN SEMI*

## 2018-10-22 PROCEDURE — 80307 DRUG TEST PRSMV CHEM ANLYZR: CPT

## 2018-10-22 PROCEDURE — 85025 COMPLETE CBC W/AUTO DIFF WBC: CPT

## 2018-10-22 PROCEDURE — 700102 HCHG RX REV CODE 250 W/ 637 OVERRIDE(OP): Performed by: HOSPITALIST

## 2018-10-22 PROCEDURE — 99223 1ST HOSP IP/OBS HIGH 75: CPT | Performed by: HOSPITALIST

## 2018-10-22 PROCEDURE — 80048 BASIC METABOLIC PNL TOTAL CA: CPT | Mod: 91

## 2018-10-22 PROCEDURE — 83735 ASSAY OF MAGNESIUM: CPT

## 2018-10-22 RX ORDER — GABAPENTIN 300 MG/1
600 CAPSULE ORAL 3 TIMES DAILY
Status: DISCONTINUED | OUTPATIENT
Start: 2018-10-22 | End: 2018-10-25 | Stop reason: HOSPADM

## 2018-10-22 RX ORDER — ONDANSETRON 2 MG/ML
4 INJECTION INTRAMUSCULAR; INTRAVENOUS EVERY 4 HOURS PRN
Status: DISCONTINUED | OUTPATIENT
Start: 2018-10-22 | End: 2018-10-25 | Stop reason: HOSPADM

## 2018-10-22 RX ORDER — POLYETHYLENE GLYCOL 3350 17 G/17G
1 POWDER, FOR SOLUTION ORAL
Status: DISCONTINUED | OUTPATIENT
Start: 2018-10-22 | End: 2018-10-25 | Stop reason: HOSPADM

## 2018-10-22 RX ORDER — DEXTROSE AND SODIUM CHLORIDE 10; .45 G/100ML; G/100ML
INJECTION, SOLUTION INTRAVENOUS CONTINUOUS
Status: DISCONTINUED | OUTPATIENT
Start: 2018-10-22 | End: 2018-10-22

## 2018-10-22 RX ORDER — BISACODYL 10 MG
10 SUPPOSITORY, RECTAL RECTAL
Status: DISCONTINUED | OUTPATIENT
Start: 2018-10-22 | End: 2018-10-25 | Stop reason: HOSPADM

## 2018-10-22 RX ORDER — SODIUM CHLORIDE 9 MG/ML
2000 INJECTION, SOLUTION INTRAVENOUS ONCE
Status: DISCONTINUED | OUTPATIENT
Start: 2018-10-22 | End: 2018-10-22

## 2018-10-22 RX ORDER — AMOXICILLIN 250 MG
2 CAPSULE ORAL 2 TIMES DAILY
Status: DISCONTINUED | OUTPATIENT
Start: 2018-10-22 | End: 2018-10-25 | Stop reason: HOSPADM

## 2018-10-22 RX ORDER — SODIUM CHLORIDE 9 MG/ML
INJECTION, SOLUTION INTRAVENOUS CONTINUOUS
Status: DISCONTINUED | OUTPATIENT
Start: 2018-10-22 | End: 2018-10-22

## 2018-10-22 RX ORDER — ACETAMINOPHEN 325 MG/1
650 TABLET ORAL EVERY 6 HOURS PRN
Status: DISCONTINUED | OUTPATIENT
Start: 2018-10-22 | End: 2018-10-25 | Stop reason: HOSPADM

## 2018-10-22 RX ORDER — PROMETHAZINE HYDROCHLORIDE 25 MG/1
12.5-25 TABLET ORAL EVERY 4 HOURS PRN
Status: DISCONTINUED | OUTPATIENT
Start: 2018-10-22 | End: 2018-10-25 | Stop reason: HOSPADM

## 2018-10-22 RX ORDER — SODIUM CHLORIDE 9 MG/ML
1000 INJECTION, SOLUTION INTRAVENOUS ONCE
Status: COMPLETED | OUTPATIENT
Start: 2018-10-22 | End: 2018-10-22

## 2018-10-22 RX ORDER — LABETALOL HYDROCHLORIDE 5 MG/ML
10 INJECTION, SOLUTION INTRAVENOUS EVERY 4 HOURS PRN
Status: DISCONTINUED | OUTPATIENT
Start: 2018-10-22 | End: 2018-10-25 | Stop reason: HOSPADM

## 2018-10-22 RX ORDER — DEXTROSE AND SODIUM CHLORIDE 5; .45 G/100ML; G/100ML
INJECTION, SOLUTION INTRAVENOUS CONTINUOUS
Status: DISCONTINUED | OUTPATIENT
Start: 2018-10-22 | End: 2018-10-22

## 2018-10-22 RX ORDER — DEXTROSE MONOHYDRATE 25 G/50ML
25 INJECTION, SOLUTION INTRAVENOUS
Status: DISCONTINUED | OUTPATIENT
Start: 2018-10-22 | End: 2018-10-23

## 2018-10-22 RX ORDER — HEPARIN SODIUM 5000 [USP'U]/ML
5000 INJECTION, SOLUTION INTRAVENOUS; SUBCUTANEOUS EVERY 8 HOURS
Status: DISCONTINUED | OUTPATIENT
Start: 2018-10-22 | End: 2018-10-25 | Stop reason: HOSPADM

## 2018-10-22 RX ORDER — MAGNESIUM SULFATE HEPTAHYDRATE 40 MG/ML
4 INJECTION, SOLUTION INTRAVENOUS
Status: DISCONTINUED | OUTPATIENT
Start: 2018-10-22 | End: 2018-10-22

## 2018-10-22 RX ORDER — SODIUM CHLORIDE, SODIUM LACTATE, POTASSIUM CHLORIDE, AND CALCIUM CHLORIDE .6; .31; .03; .02 G/100ML; G/100ML; G/100ML; G/100ML
2000 INJECTION, SOLUTION INTRAVENOUS ONCE
Status: DISCONTINUED | OUTPATIENT
Start: 2018-10-22 | End: 2018-10-22

## 2018-10-22 RX ORDER — SODIUM CHLORIDE 9 MG/ML
INJECTION, SOLUTION INTRAVENOUS CONTINUOUS
Status: DISCONTINUED | OUTPATIENT
Start: 2018-10-22 | End: 2018-10-24

## 2018-10-22 RX ORDER — MAGNESIUM SULFATE HEPTAHYDRATE 40 MG/ML
2 INJECTION, SOLUTION INTRAVENOUS
Status: DISCONTINUED | OUTPATIENT
Start: 2018-10-22 | End: 2018-10-22

## 2018-10-22 RX ORDER — INSULIN GLARGINE 100 [IU]/ML
25 INJECTION, SOLUTION SUBCUTANEOUS 2 TIMES DAILY
Status: DISCONTINUED | OUTPATIENT
Start: 2018-10-22 | End: 2018-10-25 | Stop reason: HOSPADM

## 2018-10-22 RX ORDER — AMITRIPTYLINE HYDROCHLORIDE 25 MG/1
25 TABLET, FILM COATED ORAL NIGHTLY
Status: DISCONTINUED | OUTPATIENT
Start: 2018-10-22 | End: 2018-10-25 | Stop reason: HOSPADM

## 2018-10-22 RX ORDER — PROMETHAZINE HYDROCHLORIDE 25 MG/1
12.5-25 SUPPOSITORY RECTAL EVERY 4 HOURS PRN
Status: DISCONTINUED | OUTPATIENT
Start: 2018-10-22 | End: 2018-10-25 | Stop reason: HOSPADM

## 2018-10-22 RX ORDER — ONDANSETRON 4 MG/1
4 TABLET, ORALLY DISINTEGRATING ORAL EVERY 4 HOURS PRN
Status: DISCONTINUED | OUTPATIENT
Start: 2018-10-22 | End: 2018-10-25 | Stop reason: HOSPADM

## 2018-10-22 RX ADMIN — SODIUM CHLORIDE 1000 ML: 9 INJECTION, SOLUTION INTRAVENOUS at 17:30

## 2018-10-22 ASSESSMENT — PATIENT HEALTH QUESTIONNAIRE - PHQ9
2. FEELING DOWN, DEPRESSED, IRRITABLE, OR HOPELESS: NOT AT ALL
6. FEELING BAD ABOUT YOURSELF - OR THAT YOU ARE A FAILURE OR HAVE LET YOURSELF OR YOUR FAMILY DOWN: SEVERAL DAYS
7. TROUBLE CONCENTRATING ON THINGS, SUCH AS READING THE NEWSPAPER OR WATCHING TELEVISION: NOT AT ALL
3. TROUBLE FALLING OR STAYING ASLEEP OR SLEEPING TOO MUCH: NOT AT ALL
4. FEELING TIRED OR HAVING LITTLE ENERGY: NOT AT ALL
SUM OF ALL RESPONSES TO PHQ QUESTIONS 1-9: 3
1. LITTLE INTEREST OR PLEASURE IN DOING THINGS: SEVERAL DAYS
5. POOR APPETITE OR OVEREATING: NOT AT ALL
9. THOUGHTS THAT YOU WOULD BE BETTER OFF DEAD, OR OF HURTING YOURSELF: SEVERAL DAYS
SUM OF ALL RESPONSES TO PHQ9 QUESTIONS 1 AND 2: 1
8. MOVING OR SPEAKING SO SLOWLY THAT OTHER PEOPLE COULD HAVE NOTICED. OR THE OPPOSITE, BEING SO FIGETY OR RESTLESS THAT YOU HAVE BEEN MOVING AROUND A LOT MORE THAN USUAL: NOT AT ALL

## 2018-10-22 ASSESSMENT — COGNITIVE AND FUNCTIONAL STATUS - GENERAL
SUGGESTED CMS G CODE MODIFIER DAILY ACTIVITY: CH
MOVING TO AND FROM BED TO CHAIR: UNABLE
MOVING FROM LYING ON BACK TO SITTING ON SIDE OF FLAT BED: UNABLE
TURNING FROM BACK TO SIDE WHILE IN FLAT BAD: UNABLE
MOBILITY SCORE: 15
DAILY ACTIVITIY SCORE: 24
SUGGESTED CMS G CODE MODIFIER MOBILITY: CK

## 2018-10-22 ASSESSMENT — LIFESTYLE VARIABLES
ALCOHOL_USE: NO
EVER_SMOKED: YES

## 2018-10-22 ASSESSMENT — COPD QUESTIONNAIRES
COPD SCREENING SCORE: 3
DURING THE PAST 4 WEEKS HOW MUCH DID YOU FEEL SHORT OF BREATH: SOME OF THE TIME
DO YOU EVER COUGH UP ANY MUCUS OR PHLEGM?: NO/ONLY WITH OCCASIONAL COLDS OR INFECTIONS
HAVE YOU SMOKED AT LEAST 100 CIGARETTES IN YOUR ENTIRE LIFE: YES
IN THE PAST 12 MONTHS DO YOU DO LESS THAN YOU USED TO BECAUSE OF YOUR BREATHING PROBLEMS: DISAGREE/UNSURE
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
## 2018-10-23 PROBLEM — E87.1 HYPONATREMIA: Status: ACTIVE | Noted: 2018-10-23

## 2018-10-23 LAB
ALBUMIN SERPL BCP-MCNC: 3.7 G/DL (ref 3.2–4.9)
ALBUMIN/GLOB SERPL: 1.5 G/DL
ALP SERPL-CCNC: 70 U/L (ref 30–99)
ALT SERPL-CCNC: 16 U/L (ref 2–50)
ANION GAP SERPL CALC-SCNC: 4 MMOL/L (ref 0–11.9)
AST SERPL-CCNC: 10 U/L (ref 12–45)
BILIRUB SERPL-MCNC: 0.4 MG/DL (ref 0.1–1.5)
BUN SERPL-MCNC: 13 MG/DL (ref 8–22)
CALCIUM SERPL-MCNC: 8.8 MG/DL (ref 8.5–10.5)
CHLORIDE SERPL-SCNC: 105 MMOL/L (ref 96–112)
CO2 SERPL-SCNC: 28 MMOL/L (ref 20–33)
CREAT SERPL-MCNC: 0.74 MG/DL (ref 0.5–1.4)
ERYTHROCYTE [DISTWIDTH] IN BLOOD BY AUTOMATED COUNT: 39.2 FL (ref 35.9–50)
GLOBULIN SER CALC-MCNC: 2.4 G/DL (ref 1.9–3.5)
GLUCOSE BLD-MCNC: 143 MG/DL (ref 65–99)
GLUCOSE BLD-MCNC: 192 MG/DL (ref 65–99)
GLUCOSE BLD-MCNC: 199 MG/DL (ref 65–99)
GLUCOSE BLD-MCNC: 223 MG/DL (ref 65–99)
GLUCOSE BLD-MCNC: 439 MG/DL (ref 65–99)
GLUCOSE BLD-MCNC: 452 MG/DL (ref 65–99)
GLUCOSE BLD-MCNC: 55 MG/DL (ref 65–99)
GLUCOSE BLD-MCNC: 69 MG/DL (ref 65–99)
GLUCOSE SERPL-MCNC: 276 MG/DL (ref 65–99)
HCT VFR BLD AUTO: 40.6 % (ref 42–52)
HGB BLD-MCNC: 13.5 G/DL (ref 14–18)
MAGNESIUM SERPL-MCNC: 1.9 MG/DL (ref 1.5–2.5)
MAGNESIUM SERPL-MCNC: 2 MG/DL (ref 1.5–2.5)
MCH RBC QN AUTO: 29 PG (ref 27–33)
MCHC RBC AUTO-ENTMCNC: 33.3 G/DL (ref 33.7–35.3)
MCV RBC AUTO: 87.3 FL (ref 81.4–97.8)
PHOSPHATE SERPL-MCNC: 2.6 MG/DL (ref 2.5–4.5)
PHOSPHATE SERPL-MCNC: 4 MG/DL (ref 2.5–4.5)
PLATELET # BLD AUTO: 305 K/UL (ref 164–446)
PMV BLD AUTO: 9.6 FL (ref 9–12.9)
POTASSIUM SERPL-SCNC: 3.7 MMOL/L (ref 3.6–5.5)
PROT SERPL-MCNC: 6.1 G/DL (ref 6–8.2)
RBC # BLD AUTO: 4.65 M/UL (ref 4.7–6.1)
SODIUM SERPL-SCNC: 137 MMOL/L (ref 135–145)
WBC # BLD AUTO: 8.3 K/UL (ref 4.8–10.8)

## 2018-10-23 PROCEDURE — 770006 HCHG ROOM/CARE - MED/SURG/GYN SEMI*

## 2018-10-23 PROCEDURE — 700102 HCHG RX REV CODE 250 W/ 637 OVERRIDE(OP): Performed by: HOSPITALIST

## 2018-10-23 PROCEDURE — 82962 GLUCOSE BLOOD TEST: CPT

## 2018-10-23 PROCEDURE — 84100 ASSAY OF PHOSPHORUS: CPT | Mod: 91

## 2018-10-23 PROCEDURE — 85027 COMPLETE CBC AUTOMATED: CPT

## 2018-10-23 PROCEDURE — 700105 HCHG RX REV CODE 258: Performed by: HOSPITALIST

## 2018-10-23 PROCEDURE — 83735 ASSAY OF MAGNESIUM: CPT

## 2018-10-23 PROCEDURE — 700111 HCHG RX REV CODE 636 W/ 250 OVERRIDE (IP): Performed by: HOSPITALIST

## 2018-10-23 PROCEDURE — 36415 COLL VENOUS BLD VENIPUNCTURE: CPT

## 2018-10-23 PROCEDURE — A9270 NON-COVERED ITEM OR SERVICE: HCPCS | Performed by: HOSPITALIST

## 2018-10-23 PROCEDURE — 700101 HCHG RX REV CODE 250: Performed by: HOSPITALIST

## 2018-10-23 PROCEDURE — 80053 COMPREHEN METABOLIC PANEL: CPT

## 2018-10-23 PROCEDURE — 99232 SBSQ HOSP IP/OBS MODERATE 35: CPT | Performed by: HOSPITALIST

## 2018-10-23 RX ORDER — DEXTROSE MONOHYDRATE 25 G/50ML
25 INJECTION, SOLUTION INTRAVENOUS
Status: DISCONTINUED | OUTPATIENT
Start: 2018-10-23 | End: 2018-10-24

## 2018-10-23 RX ADMIN — DEXTROSE MONOHYDRATE 25 ML: 25 INJECTION, SOLUTION INTRAVENOUS at 08:24

## 2018-10-23 RX ADMIN — HEPARIN SODIUM 5000 UNITS: 5000 INJECTION, SOLUTION INTRAVENOUS; SUBCUTANEOUS at 00:03

## 2018-10-23 RX ADMIN — INSULIN GLARGINE 25 UNITS: 100 INJECTION, SOLUTION SUBCUTANEOUS at 00:39

## 2018-10-23 RX ADMIN — INSULIN GLARGINE 25 UNITS: 100 INJECTION, SOLUTION SUBCUTANEOUS at 17:35

## 2018-10-23 RX ADMIN — INSULIN HUMAN 9 UNITS: 100 INJECTION, SOLUTION PARENTERAL at 20:53

## 2018-10-23 RX ADMIN — GABAPENTIN 600 MG: 300 CAPSULE ORAL at 20:48

## 2018-10-23 RX ADMIN — INSULIN HUMAN 4 UNITS: 100 INJECTION, SOLUTION PARENTERAL at 00:30

## 2018-10-23 RX ADMIN — HEPARIN SODIUM 5000 UNITS: 5000 INJECTION, SOLUTION INTRAVENOUS; SUBCUTANEOUS at 12:43

## 2018-10-23 RX ADMIN — GABAPENTIN 600 MG: 300 CAPSULE ORAL at 12:43

## 2018-10-23 RX ADMIN — SODIUM CHLORIDE: 9 INJECTION, SOLUTION INTRAVENOUS at 12:49

## 2018-10-23 RX ADMIN — HEPARIN SODIUM 5000 UNITS: 5000 INJECTION, SOLUTION INTRAVENOUS; SUBCUTANEOUS at 20:50

## 2018-10-23 RX ADMIN — AMITRIPTYLINE HYDROCHLORIDE 25 MG: 25 TABLET, FILM COATED ORAL at 20:49

## 2018-10-23 RX ADMIN — GABAPENTIN 600 MG: 300 CAPSULE ORAL at 00:02

## 2018-10-23 RX ADMIN — HEPARIN SODIUM 5000 UNITS: 5000 INJECTION, SOLUTION INTRAVENOUS; SUBCUTANEOUS at 05:10

## 2018-10-23 RX ADMIN — GABAPENTIN 600 MG: 300 CAPSULE ORAL at 05:10

## 2018-10-23 RX ADMIN — SODIUM CHLORIDE: 9 INJECTION, SOLUTION INTRAVENOUS at 00:03

## 2018-10-23 RX ADMIN — AMITRIPTYLINE HYDROCHLORIDE 25 MG: 25 TABLET, FILM COATED ORAL at 00:32

## 2018-10-23 ASSESSMENT — ENCOUNTER SYMPTOMS
HEADACHES: 0
SHORTNESS OF BREATH: 0
SPUTUM PRODUCTION: 0
DIZZINESS: 0
BACK PAIN: 0
DOUBLE VISION: 0
ABDOMINAL PAIN: 0
CHILLS: 0
WEAKNESS: 1
LOSS OF CONSCIOUSNESS: 0
VOMITING: 0
ROS GI COMMENTS: HUNGRY
NAUSEA: 1
BLURRED VISION: 0
FEVER: 0
NAUSEA: 0
PALPITATIONS: 0
PSYCHIATRIC NEGATIVE: 1
HEARTBURN: 0
FALLS: 0
DIAPHORESIS: 0
COUGH: 0
SORE THROAT: 0
TINGLING: 1

## 2018-10-23 ASSESSMENT — PAIN SCALES - GENERAL
PAINLEVEL_OUTOF10: 8
PAINLEVEL_OUTOF10: 0
PAINLEVEL_OUTOF10: 0

## 2018-10-23 NOTE — PROGRESS NOTES
Received bedside report from NOC RN. Assumed care at 0700. Patient resting comfortably in bed, no s/s of distress at this time. Patient able to make needs known and denies any at this time. Bed alarm not indicated, fall precautions implemented.

## 2018-10-23 NOTE — CARE PLAN
Problem: Communication  Goal: The ability to communicate needs accurately and effectively will improve  Outcome: PROGRESSING AS EXPECTED  Encouraged pt to express his feelings in a healthy manner    Problem: Infection  Goal: Will remain free from infection  Outcome: PROGRESSING AS EXPECTED  No s/s of infection or fevers noted

## 2018-10-23 NOTE — PROGRESS NOTES
Received Pt at approx 2250 in stable condition, no s/s of distress noted. Oriented to room and surroundings, will continue to monitor.

## 2018-10-23 NOTE — PROGRESS NOTES
BS recheck was 69. Patient was given more juice and estrella crackers, but due to lethargy and lack of staying awake this RN gave dextrose. See MAR for admin details.

## 2018-10-23 NOTE — H&P
Hospital Medicine History & Physical Note    Date of Service  10/23/2018    Primary Care Physician  Pcp Pt States None    Consultants  None    Code Status  Full code    Chief Complaint  Hyperglycemia    History of Presenting Illness  32 y.o. male with past medical history of insulin-dependent diabetes, hypertension, homelessness who presented 10/22/2018 with uncontrolled blood sugars.  Patient has known history of noncompliance and states he had not used his insulin for the last several days.  He is also been having lower extremity pain for the last few days and appears he has not been taking his gabapentin.  He denies any abdominal pain, nausea, vomiting.  He states he is also not eaten in several days either.    Review of Systems  Review of Systems   Constitutional: Negative for chills and fever.   HENT: Negative for congestion, hearing loss and sore throat.    Eyes: Negative for blurred vision and double vision.   Respiratory: Negative for cough, sputum production and shortness of breath.    Cardiovascular: Negative for chest pain and palpitations.   Gastrointestinal: Negative for abdominal pain, heartburn and nausea.        Hungry   Genitourinary: Negative for dysuria and urgency.   Musculoskeletal: Negative for back pain and falls.        Bilateral leg pain   Skin: Negative for itching and rash.   Neurological: Positive for tingling (Down lower extremities). Negative for dizziness, loss of consciousness and headaches.   All other systems reviewed and are negative.      Past Medical History   has a past medical history of Diabetes; Hypertension; and Seizure (HCC).    Surgical History   has no past surgical history on file.     Family History  family history includes No Known Problems in his father and mother.     Social History   reports that he has been smoking Cigarettes.  He has a 8.50 pack-year smoking history. He has never used smokeless tobacco. He reports that he drinks alcohol. He reports that he does  not use drugs.    Allergies  No Known Allergies    Medications  Prior to Admission Medications   Prescriptions Last Dose Informant Patient Reported? Taking?   Insulin Glargine (BASAGLAR KWIKPEN) 100 UNIT/ML Solution Pen-injector   No No   Sig: Inject 25 Units as instructed 2 Times a Day for 30 days. Sliding Scale Inject 25 Units as instructed 2 Times a Day. Sliding Scale   amitriptyline (ELAVIL) 25 MG Tab 10/13/2018 [facility] Yes No   Sig: Take 25 mg by mouth every evening.   gabapentin (NEURONTIN) 300 MG Cap 10/10/2018 at [facility] Yes No   Sig: Take 600 mg by mouth 3 times a day.   hydrOXYzine HCl (ATARAX) 25 MG Tab 10/10/2018 at [facility] Yes No   Sig: Take 25 mg by mouth 3 times a day as needed for Anxiety.   insulin lispro (HUMALOG) 100 UNIT/ML Solution > 2 days at [facility] Yes No   Sig: Inject 2-20 Units as instructed 3 times a day before meals. Sliding Scale   potassium chloride SA (KDUR) 20 MEQ Tab CR   No No   Sig: Take 2 Tabs by mouth every day for 7 days.      Facility-Administered Medications: None       Physical Exam  Temp:  [36.3 °C (97.3 °F)-36.4 °C (97.5 °F)] 36.4 °C (97.5 °F)  Pulse:  [] 81  Resp:  [14-20] 15  BP: (123)/(72-80) 123/80    Physical Exam   Constitutional: He is oriented to person, place, and time. He appears well-developed and well-nourished.   Patient very sleepy   HENT:   Head: Normocephalic and atraumatic.   Eyes: Pupils are equal, round, and reactive to light. EOM are normal.   Neck: Normal range of motion. Neck supple.   Cardiovascular: Normal rate, regular rhythm and normal heart sounds.    No murmur heard.  Pulmonary/Chest: Effort normal and breath sounds normal. No respiratory distress. He has no wheezes.   Abdominal: Soft. Bowel sounds are normal. He exhibits no distension. There is no tenderness.   Musculoskeletal: Normal range of motion. He exhibits no edema.   Neurological: He  is oriented to person, place, and time.   Skin: Skin is warm and dry.   Nursing note and vitals reviewed.      Laboratory:  Recent Labs      10/22/18   1700   WBC  7.9   RBC  4.65*   HEMOGLOBIN  14.0   HEMATOCRIT  41.9*   MCV  90.1   MCH  30.1   MCHC  33.4*   RDW  40.5   PLATELETCT  289   MPV  10.0     Recent Labs      10/22/18   1700  10/22/18   1930   SODIUM  128*  134*   POTASSIUM  4.6  3.9   CHLORIDE  94*  103   CO2  26  22   GLUCOSE  817*  416*   BUN  17  13   CREATININE  0.98  0.73   CALCIUM  9.0  8.8     Recent Labs      10/22/18   1700  10/22/18   1930   GLUCOSE  817*  416*                 No results for input(s): TROPONINI in the last 72 hours.    Urinalysis:    No results found     Imaging:  No orders to display         Assessment/Plan:  I anticipate this patient will require at least two midnights for appropriate medical management, necessitating inpatient admission.    * Hyperglycemia- (present on admission)   Assessment & Plan    Blood sugars in the 800s at time of presentation, no signs/symptoms of infection.  Likely due to medication noncompliance  Improved with IV fluids  Continue home regimen  Insulin sliding scale, Accu-Cheks, hypoglycemic protocol, diabetic diet          Type I diabetes mellitus (HCC)- (present on admission)   Assessment & Plan    Has been noncompliant with his medications  Management as above        Hyponatremia   Assessment & Plan    Likely pseudohyponatremia secondary to uncontrolled blood sugars  Continue to monitor        Diabetic polyneuropathy associated with type 1 diabetes mellitus (HCC)- (present on admission)   Assessment & Plan    Continue gabapentin            VTE prophylaxis: Heparin

## 2018-10-23 NOTE — ED NOTES
[facility] aware of glucose change, states will clarify with Dr. Solis about possible decrease in insulin bolus

## 2018-10-23 NOTE — PROGRESS NOTES
· 2 RN skin check complete.   · Devices in place none.  · Skin assessed under devices n/a.  · Confirmed pressure ulcers found on n/a.  · New potential pressure ulcers noted on n/a.  · The following interventions in place n/a. Pt ambulatory, skin intact.

## 2018-10-23 NOTE — PROGRESS NOTES
[facility]    Date of Service: 10/23/2018    Chief Complaint  32 y.o. male admitted 10/22/2018 with uncontrolled type 1 DM. H/o noncompliance with medications    Interval Problem Update  Tired this AM, no acute distress. Wakes to answers questions then falls back to sleep. Episode of hypoglycemia to 55 this AM.     Consultants/Specialty  None    Disposition  Pending medical clearance.         Review of Systems   Constitutional: Positive for malaise/fatigue. Negative for chills, diaphoresis and fever.   HENT: Negative for congestion.    Respiratory: Negative for cough and shortness of breath.    Cardiovascular: Negative for chest pain, palpitations and leg swelling.   Gastrointestinal: Positive for nausea. Negative for abdominal pain and vomiting.   Genitourinary: Negative for dysuria.   Musculoskeletal: Negative for falls.   Neurological: Positive for weakness. Negative for loss of consciousness and headaches.   Psychiatric/Behavioral: Negative.    All other systems reviewed and are negative.     Physical Exam  Laboratory/Imaging   Hemodynamics  Temp (24hrs), Av.4 °C (97.5 °F), Min:36.3 °C (97.3 °F), Max:36.5 °C (97.7 °F)   Temperature: 36.5 °C (97.7 °F)  Pulse  Av  Min: 80  Max: 103 Heart Rate (Monitored): 91  Blood Pressure: 129/88, NIBP: 101/65      Respiratory      Respiration: 15, Pulse Oximetry: 97 %             Fluids  No intake or output data in the 24 hours ending 10/23/18 0633    Nutrition  Orders Placed This Encounter   Procedures   • Diet Order Diabetic     Standing Status:   Standing     Number of Occurrences:   1     Order Specific Question:   Diet:     Answer:   Diabetic [3]     Physical Exam   Constitutional: He is oriented to person, place, and time. He appears well-developed. No distress.   HENT:   Head: Normocephalic.   Mouth/Throat: Oropharynx is clear and moist.   Eyes: Pupils are equal, round, and reactive to light. EOM are normal. No scleral icterus.   Neck:  Normal range of motion. No tracheal deviation present.   Cardiovascular: Normal rate, regular rhythm and intact distal pulses.    Pulmonary/Chest: Effort normal. No respiratory distress. He has no wheezes. He has no rales.   Abdominal: Soft. He exhibits no distension. There is no tenderness.   Musculoskeletal: He exhibits no edema.   Neurological: He is alert and oriented to person, place, and time.   Skin: Skin is warm and dry. No rash noted.   Multiple tattoos   Psychiatric: He has a normal mood and affect. His behavior is normal.   Vitals reviewed.      Recent Labs      10/22/18   1700  10/23/18   0044   WBC  7.9  8.3   RBC  4.65*  4.65*   HEMOGLOBIN  14.0  13.5*   HEMATOCRIT  41.9*  40.6*   MCV  90.1  87.3   MCH  30.1  29.0   MCHC  33.4*  33.3*   RDW  40.5  39.2   PLATELETCT  289  305   MPV  10.0  9.6     Recent Labs      10/22/18   1700  10/22/18   1930  10/23/18   0044   SODIUM  128*  134*  137   POTASSIUM  4.6  3.9  3.7   CHLORIDE  94*  103  105   CO2  26  22  28   GLUCOSE  817*  416*  276*   BUN  17  13  13   CREATININE  0.98  0.73  0.74   CALCIUM  9.0  8.8  8.8                      Assessment/Plan     * Hyperglycemia- (present on admission)   Assessment & Plan    Blood sugars in the 800s at time of presentation, no ketones in the urine. No evidence of active infection. Suspect 2/2 medication noncompliance. Episode of hypoglycemia today down to 55.   - reduce IVF from 150--> 83cc/hr  - continue with lantus 25U BID  - reduce sliding scale to moderate  - hypoglycemia protocol, diabetic diet        Type I diabetes mellitus (HCC)- (present on admission)   Assessment & Plan    Has been noncompliant with his medications. Poorly controlled with hyperglycemia. A1C elevated at 15.7%  - as noted above        Diabetic polyneuropathy associated with type 1 diabetes mellitus (HCC)- (present on admission)   Assessment & Plan    Stable.   - continue home gabapentin        Hyponatremia- (present on admission)   Assessment &  Plan    Pseudohyponatremia secondary to uncontrolled blood sugars. Resolved today  - continue to monitor        Homelessness- (present on admission)   Assessment & Plan    SW to help with resources and medications. Greatly appreciate.          Quality-Core Measures

## 2018-10-23 NOTE — ASSESSMENT & PLAN NOTE
Blood sugars in the 800s at time of presentation, no ketones in the urine. No evidence of active infection. Suspect 2/2 medication noncompliance. Per , medications have not been picked up from [facility].  - d/c fluids today, encourage ambulation and PO fluid intake  - continue with lantus 25U BID  - increase to resistant sliding scale  - hypoglycemia protocol, diabetic diet

## 2018-10-23 NOTE — ASSESSMENT & PLAN NOTE
Has been noncompliant with his medications. Poorly controlled with hyperglycemia. A1C elevated at 15.7%  - as noted above  - continued education and counseling

## 2018-10-23 NOTE — ASSESSMENT & PLAN NOTE
Endorses continued paresthesias. States that increased gabapentin makes him tired, explains that codeine has helped him in the past.  - continue home gabapentin and amitriptyline

## 2018-10-24 PROBLEM — E87.1 HYPONATREMIA: Status: RESOLVED | Noted: 2018-10-23 | Resolved: 2018-10-24

## 2018-10-24 LAB
ANION GAP SERPL CALC-SCNC: 5 MMOL/L (ref 0–11.9)
BUN SERPL-MCNC: 11 MG/DL (ref 8–22)
CALCIUM SERPL-MCNC: 8.6 MG/DL (ref 8.5–10.5)
CHLORIDE SERPL-SCNC: 106 MMOL/L (ref 96–112)
CO2 SERPL-SCNC: 24 MMOL/L (ref 20–33)
CREAT SERPL-MCNC: 0.63 MG/DL (ref 0.5–1.4)
GLUCOSE BLD-MCNC: 264 MG/DL (ref 65–99)
GLUCOSE BLD-MCNC: 281 MG/DL (ref 65–99)
GLUCOSE BLD-MCNC: 295 MG/DL (ref 65–99)
GLUCOSE BLD-MCNC: 312 MG/DL (ref 65–99)
GLUCOSE SERPL-MCNC: 316 MG/DL (ref 65–99)
POTASSIUM SERPL-SCNC: 3.5 MMOL/L (ref 3.6–5.5)
SODIUM SERPL-SCNC: 135 MMOL/L (ref 135–145)

## 2018-10-24 PROCEDURE — 80048 BASIC METABOLIC PNL TOTAL CA: CPT

## 2018-10-24 PROCEDURE — 99232 SBSQ HOSP IP/OBS MODERATE 35: CPT | Performed by: HOSPITALIST

## 2018-10-24 PROCEDURE — 82962 GLUCOSE BLOOD TEST: CPT | Mod: 91

## 2018-10-24 PROCEDURE — A9270 NON-COVERED ITEM OR SERVICE: HCPCS | Performed by: HOSPITALIST

## 2018-10-24 PROCEDURE — 700102 HCHG RX REV CODE 250 W/ 637 OVERRIDE(OP): Performed by: HOSPITALIST

## 2018-10-24 PROCEDURE — 770006 HCHG ROOM/CARE - MED/SURG/GYN SEMI*

## 2018-10-24 PROCEDURE — 700111 HCHG RX REV CODE 636 W/ 250 OVERRIDE (IP): Performed by: HOSPITALIST

## 2018-10-24 PROCEDURE — 36415 COLL VENOUS BLD VENIPUNCTURE: CPT

## 2018-10-24 RX ORDER — DEXTROSE MONOHYDRATE 25 G/50ML
25 INJECTION, SOLUTION INTRAVENOUS
Status: DISCONTINUED | OUTPATIENT
Start: 2018-10-24 | End: 2018-10-25 | Stop reason: HOSPADM

## 2018-10-24 RX ADMIN — INSULIN HUMAN 10 UNITS: 100 INJECTION, SOLUTION PARENTERAL at 17:46

## 2018-10-24 RX ADMIN — AMITRIPTYLINE HYDROCHLORIDE 25 MG: 25 TABLET, FILM COATED ORAL at 21:16

## 2018-10-24 RX ADMIN — GABAPENTIN 600 MG: 300 CAPSULE ORAL at 12:28

## 2018-10-24 RX ADMIN — INSULIN HUMAN 7 UNITS: 100 INJECTION, SOLUTION PARENTERAL at 12:32

## 2018-10-24 RX ADMIN — INSULIN GLARGINE 25 UNITS: 100 INJECTION, SOLUTION SUBCUTANEOUS at 17:46

## 2018-10-24 RX ADMIN — HEPARIN SODIUM 5000 UNITS: 5000 INJECTION, SOLUTION INTRAVENOUS; SUBCUTANEOUS at 21:22

## 2018-10-24 RX ADMIN — INSULIN HUMAN 7 UNITS: 100 INJECTION, SOLUTION PARENTERAL at 21:19

## 2018-10-24 RX ADMIN — GABAPENTIN 600 MG: 300 CAPSULE ORAL at 05:28

## 2018-10-24 RX ADMIN — GABAPENTIN 600 MG: 300 CAPSULE ORAL at 17:43

## 2018-10-24 RX ADMIN — INSULIN GLARGINE 25 UNITS: 100 INJECTION, SOLUTION SUBCUTANEOUS at 08:40

## 2018-10-24 RX ADMIN — HEPARIN SODIUM 5000 UNITS: 5000 INJECTION, SOLUTION INTRAVENOUS; SUBCUTANEOUS at 12:28

## 2018-10-24 RX ADMIN — HEPARIN SODIUM 5000 UNITS: 5000 INJECTION, SOLUTION INTRAVENOUS; SUBCUTANEOUS at 05:28

## 2018-10-24 ASSESSMENT — ENCOUNTER SYMPTOMS
COUGH: 0
DIAPHORESIS: 0
PALPITATIONS: 0
DIZZINESS: 0
LOSS OF CONSCIOUSNESS: 0
SENSORY CHANGE: 1
SHORTNESS OF BREATH: 0
WEAKNESS: 0
ABDOMINAL PAIN: 0
CHILLS: 0
FEVER: 0
PSYCHIATRIC NEGATIVE: 1
VOMITING: 0
HEADACHES: 0
BLOOD IN STOOL: 0
NAUSEA: 0
FALLS: 0

## 2018-10-24 ASSESSMENT — PAIN SCALES - GENERAL
PAINLEVEL_OUTOF10: 4
PAINLEVEL_OUTOF10: 4

## 2018-10-24 NOTE — PROGRESS NOTES
[facility] Hospitalist Progress Note    Date of Service: 10/24/2018    Chief Complaint  32 y.o. male admitted 10/22/2018 with uncontrolled type 1 DM. H/o noncompliance with medications    Interval Problem Update  No further episodes of hypoglycemia. Asks to stay another couple days. No acute overnight events.    Consultants/Specialty  None    Disposition  Ancipitate to shelter tomorrow. Medications are available for  at [facility].        Review of Systems   Constitutional: Positive for malaise/fatigue. Negative for chills, diaphoresis and fever.   HENT: Negative for congestion.    Respiratory: Negative for cough and shortness of breath.    Cardiovascular: Negative for chest pain, palpitations and leg swelling.   Gastrointestinal: Negative for abdominal pain, blood in stool, nausea and vomiting.   Genitourinary: Negative for dysuria.   Musculoskeletal: Negative for falls.   Neurological: Positive for sensory change (paresthesias of bilateral LE). Negative for dizziness, loss of consciousness, weakness and headaches.   Psychiatric/Behavioral: Negative.    All other systems reviewed and are negative.     Physical Exam  Laboratory/Imaging   Hemodynamics  Temp (24hrs), Av.6 °C (97.9 °F), Min:36 °C (96.8 °F), Max:37 °C (98.6 °F)   Temperature: 36 °C (96.8 °F)  Pulse  Av.1  Min: 80  Max: 103    Blood Pressure: 117/82      Respiratory      Respiration: 18, Pulse Oximetry: 98 %             Fluids    Intake/Output Summary (Last 24 hours) at 10/24/18 0650  Last data filed at 10/23/18 2130   Gross per 24 hour   Intake             1315 ml   Output                0 ml   Net             1315 ml       Nutrition  Orders Placed This Encounter   Procedures   • Diet Order Diabetic     Standing Status:   Standing     Number of Occurrences:   1     Order Specific Question:   Diet:     Answer:   Diabetic [3]     Physical Exam   Constitutional: He is oriented to person, place, and time. He appears well-developed. No distress.   HENT:    Head: Normocephalic.   Mouth/Throat: Oropharynx is clear and moist.   Eyes: EOM are normal. No scleral icterus.   Neck: Normal range of motion. No tracheal deviation present.   Cardiovascular: Normal rate, regular rhythm and intact distal pulses.    Pulmonary/Chest: Effort normal. No respiratory distress. He has no wheezes. He has no rales.   Abdominal: Soft. He exhibits no distension. There is no tenderness.   Musculoskeletal: He exhibits no edema or tenderness.   Neurological: He is alert and oriented to person, place, and time. He has normal strength. No sensory deficit.   Skin: Skin is warm and dry. No rash noted. He is not diaphoretic.   Multiple tattoos   Psychiatric: He has a normal mood and affect. His behavior is normal.   Vitals reviewed.      Recent Labs      10/22/18   1700  10/23/18   0044   WBC  7.9  8.3   RBC  4.65*  4.65*   HEMOGLOBIN  14.0  13.5*   HEMATOCRIT  41.9*  40.6*   MCV  90.1  87.3   MCH  30.1  29.0   MCHC  33.4*  33.3*   RDW  40.5  39.2   PLATELETCT  289  305   MPV  10.0  9.6     Recent Labs      10/22/18   1930  10/23/18   0044  10/24/18   0458   SODIUM  134*  137  135   POTASSIUM  3.9  3.7  3.5*   CHLORIDE  103  105  106   CO2  22  28  24   GLUCOSE  416*  276*  316*   BUN  13  13  11   CREATININE  0.73  0.74  0.63   CALCIUM  8.8  8.8  8.6                      Assessment/Plan     * Hyperglycemia- (present on admission)   Assessment & Plan    Blood sugars in the 800s at time of presentation, no ketones in the urine. No evidence of active infection. Suspect 2/2 medication noncompliance. Per , medications have not been picked up from [facility].  - d/c fluids today, encourage ambulation and PO fluid intake  - continue with lantus 25U BID  - increase to resistant sliding scale  - hypoglycemia protocol, diabetic diet        Type I diabetes mellitus (HCC)- (present on admission)   Assessment & Plan    Has been noncompliant with his medications. Poorly controlled with  hyperglycemia. A1C elevated at 15.7%  - as noted above  - continued education and counseling        Diabetic polyneuropathy associated with type 1 diabetes mellitus (HCC)- (present on admission)   Assessment & Plan    Endorses continued paresthesias. States that increased gabapentin makes him tired, explains that codeine has helped him in the past.  - continue home gabapentin and amitriptyline        Homelessness- (present on admission)   Assessment & Plan    SW to help with resources and medications. Greatly appreciate.          Quality-Core Measures

## 2018-10-24 NOTE — PROGRESS NOTES
Received bedside report. Patient alert and oriented x4. Patient states did not receive dinner but empty tray was hot when removed. Discussed diabetic diet and caloric intake expectations. Patient verbally aggrees to POC. Denies any pain currently. Call light in reach,. No further questions or concerns at this time. Continue hourly rounding.

## 2018-10-24 NOTE — DIETARY
Nutrition Services: consult for diabetes diet education    Pt is a T1 diabetic. A1C = 15.7. History of noncompliance with medications. Homelessness per MD progress note - social work consult pending. Visited pt at bedside who states he eats what he has available. Provided pt with a prescription food pantry form as resource for food insecurity. Formal diet education not appropriate at this time - pt had no questions for this RD.    RD available prn

## 2018-10-24 NOTE — CARE PLAN
Problem: Knowledge Deficit  Goal: Knowledge of disease process/condition, treatment plan, diagnostic tests, and medications will improve  Outcome: PROGRESSING SLOWER THAN EXPECTED  Patient educated on diabetic diet and caloric intake, medications times, indication, SE, and adverse reactions.     Problem: Pain Management  Goal: Pain level will decrease to patient's comfort goal  Outcome: PROGRESSING AS EXPECTED  Patient reported lower leg neuropathic pain. Medication gabapentin provided for pain. Patient was able to sleep though night w/o further complaint.

## 2018-10-25 VITALS
WEIGHT: 160 LBS | BODY MASS INDEX: 22.9 KG/M2 | RESPIRATION RATE: 18 BRPM | TEMPERATURE: 98.1 F | DIASTOLIC BLOOD PRESSURE: 96 MMHG | HEIGHT: 70 IN | OXYGEN SATURATION: 97 % | SYSTOLIC BLOOD PRESSURE: 137 MMHG | HEART RATE: 100 BPM

## 2018-10-25 PROBLEM — R73.9 HYPERGLYCEMIA: Status: RESOLVED | Noted: 2018-10-14 | Resolved: 2018-10-25

## 2018-10-25 LAB — GLUCOSE BLD-MCNC: 223 MG/DL (ref 65–99)

## 2018-10-25 PROCEDURE — 99239 HOSP IP/OBS DSCHRG MGMT >30: CPT | Performed by: HOSPITALIST

## 2018-10-25 PROCEDURE — A9270 NON-COVERED ITEM OR SERVICE: HCPCS | Performed by: HOSPITALIST

## 2018-10-25 PROCEDURE — 82962 GLUCOSE BLOOD TEST: CPT

## 2018-10-25 PROCEDURE — 700111 HCHG RX REV CODE 636 W/ 250 OVERRIDE (IP): Performed by: HOSPITALIST

## 2018-10-25 PROCEDURE — 700102 HCHG RX REV CODE 250 W/ 637 OVERRIDE(OP): Performed by: HOSPITALIST

## 2018-10-25 RX ADMIN — INSULIN GLARGINE 25 UNITS: 100 INJECTION, SOLUTION SUBCUTANEOUS at 06:29

## 2018-10-25 RX ADMIN — HEPARIN SODIUM 5000 UNITS: 5000 INJECTION, SOLUTION INTRAVENOUS; SUBCUTANEOUS at 06:28

## 2018-10-25 RX ADMIN — GABAPENTIN 600 MG: 300 CAPSULE ORAL at 06:28

## 2018-10-25 RX ADMIN — INSULIN HUMAN 4 UNITS: 100 INJECTION, SOLUTION PARENTERAL at 06:32

## 2018-10-25 ASSESSMENT — PAIN SCALES - GENERAL: PAINLEVEL_OUTOF10: 5

## 2018-10-25 NOTE — DISCHARGE PLANNING
Anticipated Discharge Disposition: Shelter    Action: LSW discussed with IDT team. Pt was provided with resources at prior admission. Pt did not  medication from [facility] at prior discharge. LSW provided bedside nurse with 2 cab vouchers, [facility] to [facility], then [facility] to Shelter.    Barriers to Discharge: None    Plan: No additional discharge needs at this time.
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
## 2018-10-25 NOTE — PROGRESS NOTES
Received report from day nurse. Patient alert and oriented x4. Discussed POC and 10x10. Patient states scared to leave because of being homeless. Nurse provided therapeutic communication. No further questions or concerns at this time.

## 2018-10-25 NOTE — CARE PLAN
Problem: Knowledge Deficit  Goal: Knowledge of disease process/condition, treatment plan, diagnostic tests, and medications will improve  Outcome: PROGRESSING AS EXPECTED  Patient educated on diabetic s/s and insulin times. Reminded patient of food services given to him by diabetic educator    Problem: Discharge Barriers/Planning  Goal: Patient's continuum of care needs will be met  Outcome: PROGRESSING AS EXPECTED  Discussed 10x10 expectations
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
## 2018-10-25 NOTE — CARE PLAN
Problem: Safety  Goal: Will remain free from falls  Outcome: PROGRESSING AS EXPECTED  Pt is not a fall risk. Pt up self and steady on feet. Pt to be discharged today. Pt bed in lowest and locked position, call light within reach.     Problem: Discharge Barriers/Planning  Goal: Patient's continuum of care needs will be met  Outcome: PROGRESSING AS EXPECTED  Pt not looking forward to being discharged, does not feel like he is ready to leave. Pt educated that he is medically cleared and no longer needs to be here. Pt to be discharged by 1000.

## 2018-10-25 NOTE — DISCHARGE INSTRUCTIONS
Discharge Instructions    Discharged to other by taxi with self. Discharged via walking, hospital escort: Refused.  Special equipment needed: Not Applicable    Be sure to schedule a follow-up appointment with your primary care doctor or any specialists as instructed.     Discharge Plan:   Diet Plan: Discussed  Activity Level: Discussed  Smoking Cessation Offered: Patient Counseled  Confirmed Follow up Appointment: Appointment Scheduled  Confirmed Symptoms Management: Discussed  Medication Reconciliation Updated: Yes  Influenza Vaccine Indication: Patient Refuses    I understand that a diet low in cholesterol, fat, and sodium is recommended for good health. Unless I have been given specific instructions below for another diet, I accept this instruction as my diet prescription.   Other diet: Diabetic    Special Instructions: None    · Is patient discharged on Warfarin / Coumadin?   No     Depression / Suicide Risk    As you are discharged from this [facility] facility, it is important to learn how to keep safe from harming yourself.    Recognize the warning signs:  · Abrupt changes in personality, positive or negative- including increase in energy   · Giving away possessions  · Change in eating patterns- significant weight changes-  positive or negative  · Change in sleeping patterns- unable to sleep or sleeping all the time   · Unwillingness or inability to communicate  · Depression  · Unusual sadness, discouragement and loneliness  · Talk of wanting to die  · Neglect of personal appearance   · Rebelliousness- reckless behavior  · Withdrawal from people/activities they love  · Confusion- inability to concentrate     If you or a loved one observes any of these behaviors or has concerns about self-harm, here's what you can do:  · Talk about it- your feelings and reasons for harming yourself  · Remove any means that you might use to hurt yourself (examples: pills, rope, extension cords, firearm)  · Get professional  help from the community (Mental Health, Substance Abuse, psychological counseling)  · Do not be alone:Call your Safe Contact- someone whom you trust who will be there for you.  · Call your local CRISIS HOTLINE 798-4819 or 462-269-1679  · Call your local Children's Mobile Crisis Response Team [place] (300) 677-6152 or www.Frilp  · Call the toll free National Suicide Prevention Hotlines   · National Suicide Prevention Lifeline 397-636-AQQV (5921)  · National Hope Line Network 800-SUICIDE (662-0515)                      Diabetes and Foot Care  Diabetes may cause you to have problems because of poor blood supply (circulation) to your feet and legs. This may cause the skin on your feet to become thinner, break easier, and heal more slowly. Your skin may become dry, and the skin may peel and crack. You may also have nerve damage in your legs and feet causing decreased feeling in them. You may not notice minor injuries to your feet that could lead to infections or more serious problems. Taking care of your feet is one of the most important things you can do for yourself.  Follow these instructions at home:  · Wear shoes at all times, even in the house. Do not go barefoot. Bare feet are easily injured.  · Check your feet daily for blisters, cuts, and redness. If you cannot see the bottom of your feet, use a mirror or ask someone for help.  · Wash your feet with warm water (do not use hot water) and mild soap. Then pat your feet and the areas between your toes until they are completely dry. Do not soak your feet as this can dry your skin.  · Apply a moisturizing lotion or petroleum jelly (that does not contain alcohol and is unscented) to the skin on your feet and to dry, brittle toenails. Do not apply lotion between your toes.  · Trim your toenails straight across. Do not dig under them or around the cuticle. File the edges of your nails with an emery board or nail file.  · Do not cut corns or calluses or  try to remove them with medicine.  · Wear clean socks or stockings every day. Make sure they are not too tight. Do not wear knee-high stockings since they may decrease blood flow to your legs.  · Wear shoes that fit properly and have enough cushioning. To break in new shoes, wear them for just a few hours a day. This prevents you from injuring your feet. Always look in your shoes before you put them on to be sure there are no objects inside.  · Do not cross your legs. This may decrease the blood flow to your feet.  · If you find a minor scrape, cut, or break in the skin on your feet, keep it and the skin around it clean and dry. These areas may be cleansed with mild soap and water. Do not cleanse the area with peroxide, alcohol, or iodine.  · When you remove an adhesive bandage, be sure not to damage the skin around it.  · If you have a wound, look at it several times a day to make sure it is healing.  · Do not use heating pads or hot water bottles. They may burn your skin. If you have lost feeling in your feet or legs, you may not know it is happening until it is too late.  · Make sure your health care provider performs a complete foot exam at least annually or more often if you have foot problems. Report any cuts, sores, or bruises to your health care provider immediately.  Contact a health care provider if:  · You have an injury that is not healing.  · You have cuts or breaks in the skin.  · You have an ingrown nail.  · You notice redness on your legs or feet.  · You feel burning or tingling in your legs or feet.  · You have pain or cramps in your legs and feet.  · Your legs or feet are numb.  · Your feet always feel cold.  Get help right away if:  · There is increasing redness, swelling, or pain in or around a wound.  · There is a red line that goes up your leg.  · Pus is coming from a wound.  · You develop a fever or as directed by your health care provider.  · You notice a bad smell coming from an ulcer or  wound.  This information is not intended to replace advice given to you by your health care provider. Make sure you discuss any questions you have with your health care provider.  Document Released: 12/15/2001 Document Revised: 05/25/2017 Document Reviewed: 05/27/2014  [employer] Interactive Patient Education © 2017 [employer]  Diabetes Mellitus and Food  It is important for you to manage your blood sugar (glucose) level. Your blood glucose level can be greatly affected by what you eat. Eating healthier foods in the appropriate amounts throughout the day at about the same time each day will help you control your blood glucose level. It can also help slow or prevent worsening of your diabetes mellitus. Healthy eating may even help you improve the level of your blood pressure and reach or maintain a healthy weight.  General recommendations for healthful eating and cooking habits include:  · Eating meals and snacks regularly. Avoid going long periods of time without eating to lose weight.  · Eating a diet that consists mainly of plant-based foods, such as fruits, vegetables, nuts, legumes, and whole grains.  · Using low-heat cooking methods, such as baking, instead of high-heat cooking methods, such as deep frying.  Work with your dietitian to make sure you understand how to use the Nutrition Facts information on food labels.  How can food affect me?  Carbohydrates   Carbohydrates affect your blood glucose level more than any other type of food. Your dietitian will help you determine how many carbohydrates to eat at each meal and teach you how to count carbohydrates. Counting carbohydrates is important to keep your blood glucose at a healthy level, especially if you are using insulin or taking certain medicines for diabetes mellitus.  Alcohol   Alcohol can cause sudden decreases in blood glucose (hypoglycemia), especially if you use insulin or take certain medicines for diabetes mellitus. Hypoglycemia can be a  life-threatening condition. Symptoms of hypoglycemia (sleepiness, dizziness, and disorientation) are similar to symptoms of having too much alcohol.  If your health care provider has given you approval to drink alcohol, do so in moderation and use the following guidelines:  · Women should not have more than one drink per day, and men should not have more than two drinks per day. One drink is equal to:  ¨ 12 oz of beer.  ¨ 5 oz of wine.  ¨ 1½ oz of hard liquor.  · Do not drink on an empty stomach.  · Keep yourself hydrated. Have water, diet soda, or unsweetened iced tea.  · Regular soda, juice, and other mixers might contain a lot of carbohydrates and should be counted.  What foods are not recommended?  As you make food choices, it is important to remember that all foods are not the same. Some foods have fewer nutrients per serving than other foods, even though they might have the same number of calories or carbohydrates. It is difficult to get your body what it needs when you eat foods with fewer nutrients. Examples of foods that you should avoid that are high in calories and carbohydrates but low in nutrients include:  · Trans fats (most processed foods list trans fats on the Nutrition Facts label).  · Regular soda.  · Juice.  · Candy.  · Sweets, such as cake, pie, doughnuts, and cookies.  · Fried foods.  What foods can I eat?  Eat nutrient-rich foods, which will nourish your body and keep you healthy. The food you should eat also will depend on several factors, including:  · The calories you need.  · The medicines you take.  · Your weight.  · Your blood glucose level.  · Your blood pressure level.  · Your cholesterol level.  You should eat a variety of foods, including:  · Protein.  ¨ Lean cuts of meat.  ¨ Proteins low in saturated fats, such as fish, egg whites, and beans. Avoid processed meats.  · Fruits and vegetables.  ¨ Fruits and vegetables that may help control blood glucose levels, such as apples, mangoes,  and yams.  · Dairy products.  ¨ Choose fat-free or low-fat dairy products, such as milk, yogurt, and cheese.  · Grains, bread, pasta, and rice.  ¨ Choose whole grain products, such as multigrain bread, whole oats, and brown rice. These foods may help control blood pressure.  · Fats.  ¨ Foods containing healthful fats, such as nuts, avocado, olive oil, canola oil, and fish.  Does everyone with diabetes mellitus have the same meal plan?  Because every person with diabetes mellitus is different, there is not one meal plan that works for everyone. It is very important that you meet with a dietitian who will help you create a meal plan that is just right for you.  This information is not intended to replace advice given to you by your health care provider. Make sure you discuss any questions you have with your health care provider.  Document Released: 09/14/2006 Document Revised: 05/25/2017 Document Reviewed: 11/14/2014  [employer] Interactive Patient Education © 2017 [employer]  Diabetic Neuropathy  Diabetic neuropathy is a nerve disease or nerve damage that is caused by diabetes mellitus. About half of all people with diabetes mellitus have some form of nerve damage. Nerve damage is more common in those who have had diabetes mellitus for many years and who generally have not had good control of their blood sugar (glucose) level. Diabetic neuropathy is a common complication of diabetes mellitus. There are three common types of diabetic neuropathy and a fourth type that is less common and less understood:  · Peripheral neuropathy--This is the most common type of diabetic neuropathy. It causes damage to the nerves of the feet and legs first and then eventually the hands and arms. The damage affects the ability to sense touch.  · Autonomic neuropathy--This type causes damage to the autonomic nervous system, which controls the following functions:  ¨ Heartbeat.  ¨ Body temperature.  ¨ Blood  pressure.  ¨ Urination.  ¨ Digestion.  ¨ Sweating.  ¨ Sexual function.  · Focal neuropathy--Focal neuropathy can be painful and unpredictable and occurs most often in older adults with diabetes mellitus. It involves a specific nerve or one area and often comes on suddenly. It usually does not cause long-term problems.  · Radiculoplexus neuropathy-- Sometimes called lumbosacral radiculoplexus neuropathy, radiculoplexus neuropathy affects the nerves of the thighs, hips, buttocks, or legs. It is more common in people with type 2 diabetes mellitus and in older men. It is characterized by debilitating pain, weakness, and atrophy, usually in the thigh muscles.  What are the causes?  The cause of peripheral, autonomic, and focal neuropathies is diabetes mellitus that is uncontrolled and high glucose levels. The cause of radiculoplexus neuropathy is unknown. However, it is thought to be caused by inflammation related to uncontrolled glucose levels.  What are the signs or symptoms?  Peripheral Neuropathy   Peripheral neuropathy develops slowly over time. When the nerves of the feet and legs no longer work there may be:  · Burning, stabbing, or aching pain in the legs or feet.  · Inability to feel pressure or pain in your feet. This can lead to:  ¨ Thick calluses over pressure areas.  ¨ Pressure sores.  ¨ Ulcers.  · Foot deformities.  · Reduced ability to feel temperature changes.  · Muscle weakness.  Autonomic Neuropathy   The symptoms of autonomic neuropathy vary depending on which nerves are affected. Symptoms may include:  · Problems with digestion, such as:  ¨ Feeling sick to your stomach (nausea).  ¨ Vomiting.  ¨ Bloating.  ¨ Constipation.  ¨ Diarrhea.  ¨ Abdominal pain.  · Difficulty with urination. This occurs if you lose your ability to sense when your bladder is full. Problems include:  ¨ Urine leakage (incontinence).  ¨ Inability to empty your bladder completely (retention).  · Rapid or irregular heartbeat  (palpitations).  · Blood pressure drops when you stand up (orthostatic hypotension). When you stand up you may feel:  ¨ Dizzy.  ¨ Weak.  ¨ Faint.  · In men, inability to attain and maintain an erection.  · In women, vaginal dryness and problems with decreased sexual desire and arousal.  · Problems with body temperature regulation.  · Increased or decreased sweating.  Focal Neuropathy  · Abnormal eye movements or abnormal alignment of both eyes.  · Weakness in the wrist.  · Foot drop. This results in an inability to lift the foot properly and abnormal walking or foot movement.  · Paralysis on one side of your face (Bell palsy).  · Chest or abdominal pain.  Radiculoplexus Neuropathy  · Sudden, severe pain in your hip, thigh, or buttocks.  · Weakness and wasting of thigh muscles.  · Difficulty rising from a seated position.  · Abdominal swelling.  · Unexplained weight loss (usually more than 10 lb [4.5 kg]).  How is this diagnosed?  Peripheral Neuropathy   Your senses may be tested. Sensory function testing can be done with:  · A light touch using a monofilament.  · A vibration with tuning fork.  · A sharp sensation with a pin prick.  Other tests that can help diagnose neuropathy are:  · Nerve conduction velocity. This test checks the transmission of an electrical current through a nerve.  · Electromyography. This shows how muscles respond to electrical signals transmitted by nearby nerves.  · Quantitative sensory testing. This is used to assess how your nerves respond to vibrations and changes in temperature.  Autonomic Neuropathy   Diagnosis is often based on reported symptoms. Tell your health care provider if you experience:  · Dizziness.  · Constipation.  · Diarrhea.  · Inappropriate urination or inability to urinate.  · Inability to get or maintain an erection.  Tests that may be done include:  · Electrocardiography or Holter monitor. These are tests that can help show problems with the heart rate or heart  rhythm.  · An X-ray exam may be done.  Focal Neuropathy   Diagnosis is made based on your symptoms and what your health care provider finds during your exam. Other tests may be done. They may include:  · Nerve conduction velocities. This checks the transmission of electrical current through a nerve.  · Electromyography. This shows how muscles respond to electrical signals transmitted by nearby nerves.  · Quantitative sensory testing. This test is used to assess how your nerves respond to vibration and changes in temperature.  Radiculoplexus Neuropathy  · Often the first thing is to eliminate any other issue or problems that might be the cause, as there is no standard test for diagnosis.  · X-ray exam of your spine and lumbar region.  · Spinal tap to rule out cancer.  · MRI to rule out other lesions.  How is this treated?  Once nerve damage occurs, it cannot be reversed. The goal of treatment is to keep the disease or nerve damage from getting worse and affecting more nerve fibers. Controlling your blood glucose level is the key. Most people with radiculoplexus neuropathy see at least a partial improvement over time. You will need to keep your blood glucose and HbA1c levels in the target range determined by your health care provider. Things that help control blood glucose levels include:  · Blood glucose monitoring.  · Meal planning.  · Physical activity.  · Diabetes medicine.  Over time, maintaining lower blood glucose levels helps lessen symptoms. Sometimes, prescription pain medicine is needed.  Follow these instructions at home:  · Do not smoke.  · Keep your blood glucose level in the range that you and your health care provider have determined acceptable for you.  · Keep your blood pressure level in the range that you and your health care provider have determined acceptable for you.  · Eat a well-balanced diet.  · Be physically active every day. Include strength training and balance exercises.  · Protect your  feet.  ¨ Check your feet every day for sores, cuts, blisters, or signs of infection.  ¨ Wear padded socks and supportive shoes. Use orthotic inserts, if necessary.  ¨ Regularly check the insides of your shoes for worn spots. Make sure there are no rocks or other items inside your shoes before you put them on.  Contact a health care provider if:  · You have burning, stabbing, or aching pain in the legs or feet.  · You are unable to feel pressure or pain in your feet.  · You develop problems with digestion such as:  ¨ Nausea.  ¨ Vomiting.  ¨ Bloating.  ¨ Constipation.  ¨ Diarrhea.  ¨ Abdominal pain.  · You have difficulty with urination, such as:  ¨ Incontinence.  ¨ Retention.  · You have palpitations.  · You develop orthostatic hypotension. When you stand up you may feel:  ¨ Dizzy.  ¨ Weak.  ¨ Faint.  · You cannot attain and maintain an erection (in men).  · You have vaginal dryness and problems with decreased sexual desire and arousal (in women).  · You have severe pain in your thighs, legs, or buttocks.  · You have unexplained weight loss.  This information is not intended to replace advice given to you by your health care provider. Make sure you discuss any questions you have with your health care provider.  Document Released: 02/26/2003 Document Revised: 05/25/2017 Document Reviewed: 05/29/2014  [employer] Interactive Patient Education © 2017 [employer]  Hyperglycemia  Hyperglycemia occurs when the level of sugar (glucose) in the blood is too high. Glucose is a type of sugar that provides the body's main source of energy. Certain hormones (insulin and glucagon) control the level of glucose in the blood. Insulin lowers blood glucose, and glucagon increases blood glucose. Hyperglycemia can result from having too little insulin in the bloodstream, or from the body not responding normally to insulin.  Hyperglycemia occurs most often in people who have diabetes (diabetes mellitus), but it can happen in people who do  not have diabetes. It can develop quickly, and it can be life-threatening if it causes you to become severely dehydrated (diabetic ketoacidosis or hyperglycemic hyperosmolar state). Severe hyperglycemia is a medical emergency.  What are the causes?  If you have diabetes, hyperglycemia may be caused by:  · Diabetes medicine.  · Medicines that increase blood glucose or affect your diabetes control.  · Not eating enough, or not eating often enough.  · Changes in physical activity level.  · Being sick or having an infection.  If you have prediabetes or undiagnosed diabetes:  · Hyperglycemia may be caused by those conditions.  If you do not have diabetes, hyperglycemia may be caused by:  · Certain medicines, including steroid medicines, beta-blockers, epinephrine, and thiazide diuretics.  · Stress.  · Serious illness.  · Surgery.  · Diseases of the pancreas.  · Infection.  What increases the risk?  Hyperglycemia is more likely to develop in people who have risk factors for diabetes, such as:  · Having a family member with diabetes.  · Having a gene for type 1 diabetes that is passed from parent to child (inherited).  · Living in an area with cold weather conditions.  · Exposure to certain viruses.  · Certain conditions in which the body's disease-fighting (immune) system attacks itself (autoimmune disorders).  · Being overweight or obese.  · Having an inactive (sedentary) lifestyle.  · Having been diagnosed with insulin resistance.  · Having a history of prediabetes, gestational diabetes, or polycystic ovarian syndrome (PCOS).  · Being of [place] citizen, -American, /, or / descent.  What are the signs or symptoms?  Hyperglycemia may not cause any symptoms. If you do have symptoms, they may include early warning signs, such as:  · Increased thirst.  · Hunger.  · Feeling very tired.  · Needing to urinate more often than usual.  · Blurry vision.  Other symptoms may develop if  hyperglycemia gets worse, such as:  · Dry mouth.  · Loss of appetite.  · Fruity-smelling breath.  · Weakness.  · Unexpected or rapid weight gain or weight loss.  · Tingling or numbness in the hands or feet.  · Headache.  · Skin that does not quickly return to normal after being lightly pinched and released (poor skin turgor).  · Abdominal pain.  · Cuts or bruises that are slow to heal.  How is this diagnosed?  Hyperglycemia is diagnosed with a blood test to measure your blood glucose level. This blood test is usually done while you are having symptoms. Your health care provider may also do a physical exam and review your medical history.  You may have more tests to determine the cause of your hyperglycemia, such as:  · A fasting blood glucose (FBG) test. You will not be allowed to eat (you will fast) for at least 8 hours before a blood sample is taken.  · An A1c (hemoglobin A1c) blood test. This provides information about blood glucose control over the previous 2-3 months.  · An oral glucose tolerance test (OGTT). This measures your blood glucose at two times:  ¨ After fasting. This is your baseline blood glucose level.  ¨ Two hours after drinking a beverage that contains glucose.  How is this treated?  Treatment depends on the cause of your hyperglycemia. Treatment may include:  · Taking medicine to regulate your blood glucose levels. If you take insulin or other diabetes medicines, your medicine or dosage may be adjusted.  · Lifestyle changes, such as exercising more, eating healthier foods, or losing weight.  · Treating an illness or infection, if this caused your hyperglycemia.  · Checking your blood glucose more often.  · Stopping or reducing steroid medicines, if these caused your hyperglycemia.  If your hyperglycemia becomes severe and it results in hyperglycemic hyperosmolar state, you must be hospitalized and given IV fluids.  Follow these instructions at home:  General instructions  · Take over-the-counter  and prescription medicines only as told by your health care provider.  · Do not use any products that contain nicotine or tobacco, such as cigarettes and e-cigarettes. If you need help quitting, ask your health care provider.  · Limit alcohol intake to no more than 1 drink per day for nonpregnant women and 2 drinks per day for men. One drink equals 12 oz of beer, 5 oz of wine, or 1½ oz of hard liquor.  · Learn to manage stress. If you need help with this, ask your health care provider.  · Keep all follow-up visits as told by your health care provider. This is important.  Eating and drinking  · Maintain a healthy weight.  · Exercise regularly, as directed by your health care provider.  · Stay hydrated, especially when you exercise, get sick, or spend time in hot temperatures.  · Eat healthy foods, such as:  ¨ Lean proteins.  ¨ Complex carbohydrates.  ¨ Fresh fruits and vegetables.  ¨ Low-fat dairy products.  ¨ Healthy fats.  · Drink enough fluid to keep your urine clear or pale yellow.  If you have diabetes:   · Make sure you know the symptoms of hyperglycemia.  · Follow your diabetes management plan, as told by your health care provider. Make sure you:  ¨ Take your insulin and medicines as directed.  ¨ Follow your exercise plan.  ¨ Follow your meal plan. Eat on time, and do not skip meals.  ¨ Check your blood glucose as often as directed. Make sure to check your blood glucose before and after exercise. If you exercise longer or in a different way than usual, check your blood glucose more often.  ¨ Follow your sick day plan whenever you cannot eat or drink normally. Make this plan in advance with your health care provider.  · Share your diabetes management plan with people in your workplace, school, and household.  · Check your urine for ketones when you are ill and as told by your health care provider.  · Carry a medical alert card or wear medical alert jewelry.  Contact a health care provider if:  · Your blood  glucose is at or above 240 mg/dL (13.3 mmol/L) for 2 days in a row.  · You have problems keeping your blood glucose in your target range.  · You have frequent episodes of hyperglycemia.  Get help right away if:  · You have difficulty breathing.  · You have a change in how you think, feel, or act (mental status).  · You have nausea or vomiting that does not go away.  These symptoms may represent a serious problem that is an emergency. Do not wait to see if the symptoms will go away. Get medical help right away. Call your local emergency services (911 in the [place] Do not drive yourself to the hospital.   Summary  · Hyperglycemia occurs when the level of sugar (glucose) in the blood is too high.  · Hyperglycemia is diagnosed with a blood test to measure your blood glucose level. This blood test is usually done while you are having symptoms. Your health care provider may also do a physical exam and review your medical history.  · If you have diabetes, follow your diabetes management plan as told by your health care provider.  · Contact your health care provider if you have problems keeping your blood glucose in your target range.      2000 Calorie Diabetic Diet  The 2000 calorie diabetic diet is designed for eating up to 2000 calories each day. Following this diet and making healthy meal choices can help improve overall health. It controls blood glucose (sugar) levels. It can also lower blood pressure and cholesterol.  SERVING SIZES  Measuring foods and serving sizes helps to make sure you are getting the right amount of food. The list below tells how big or small some common serving sizes are.  · 1 oz.........4 stacked dice.   · 3 oz.........Deck of cards.   · 1 tsp........Tip of little finger.   · 1 tbs........Thumb.   · 2 tbs........Golf ball.   · ½ cup.......Half of a fist.   · 1 cup........A fist.   GUIDELINES FOR CHOOSING FOODS  The goal of this diet is to eat a variety of foods and limit calories to 2000 each  day. This can be done by choosing foods that are low in calories and fat. The diet also suggests eating small amounts of food often. Doing this helps control your blood glucose levels so they do not get too high or too low. Each meal or snack should contain a protein food source to help you feel more satisfied and to stabilize your blood glucose. Try to eat about the same amount of food around the same time each day. This includes weekend days, travel days, and days off work. Space your meals about 4 to 5 hours apart and add a snack between them if you wish.  For example, a daily food plan could include breakfast, a morning snack, lunch, dinner, and an evening snack. Healthy meals and snacks include whole grains, vegetables, fruits, lean meats, poultry, fish, and dairy products. As you plan your meals, choose a variety of foods. Choose from the bread and starches, vegetables, fruit, dairy, and meat/protein groups. Examples of foods from each group are listed below with their suggested serving sizes. Use measuring cups and spoons to become familiar with what a healthy portion looks like.  Bread and Starches  Each serving equals 15 grams of carbohydrates.  · 1 slice bread.   · ¼ bagel.   · ¾ cup or 1 cup cold cereal (unsweetened).   · ½ cup hot cereal or mashed potatoes.   · 1 small potato (size of a computer mouse).   ·  cup cooked pasta or rice.   · ½ English muffin.   · 1 cup broth-based soup.   · 3 cups popcorn.   · 4 to 6 whole-wheat crackers.   · ½ cup cooked beans, peas, or corn.   Vegetables  Each serving equals 5 grams of carbohydrates.  · ½ cup cooked vegetables.   · 1 cup raw vegetables.   · ½ cup tomato juice.   Fruit  Each serving equals 15 grams of carbohydrates.  · 1 small apple, banana, or orange.   · 1 ¼ cup watermelon or strawberries.   · ½ cup applesauce (no sugar added).   · 2 tbs raisins.   · ½ banana.   · ½ cup unsweetened canned fruit.   · ½ cup unsweetened fruit juice.   Dairy  Each serving  equals 12 to 15 grams of carbohydrates.  · 1 cup fat-free milk.   · 6 oz artificially sweetened yogurt.   · 1 cup buttermilk.   · 1 cup soy milk.   Meat/Protein  · 1 large egg.   · 2 to 3 oz meat, poultry, or fish.   · ½ cup cottage cheese.   · 1 tbs peanut butter.   · ½ cup tofu.   · 1 oz cheese.   · ¼ cup tuna canned in water.   SAMPLE 2000 CALORIE DIET PLAN  Breakfast  · 1 English muffin (2 carb servings).   · Reduced fat cream cheese, 1 tbs.   · 1 scrambled egg.   · ½ grapefruit (1 carb serving).   · Fat-free milk, 1 cup (1 carb serving).   Morning Snack  · Artificially sweetened yogurt, 6 oz (1 carb serving).   · 2 tbs chopped nuts.   · 1 small peach (1 carb serving).   Lunch  · Grilled chicken sandwich.   · 1 hamburger bun (2 carb servings).   · 2 oz chicken breast.   · 1 lettuce leaf.   · 2 slices tomato.   · Reduced fat mayonnaise, 1 tbs.   · Carrot sticks, 1 cup.   · Celery, 1 cup.   · 1 small apple (1 carb serving).   · Fat-free milk, 1 cup (1 carb serving).   Afternoon Snack  · ½ cup low-fat cottage cheese.   · 1 ¼ cups strawberries (1 carb serving).   Dinner  · Steak fajitas.   · 2 oz lean steak.   · 1 whole-wheat tortilla, 8 inches (1 ½ carb servings).   · Shredded lettuce, ¼ cup.   · 2 slices tomato.   · Salsa, ¼ cup.   · Low-fat sour cream, 2 tbs.   · Brown rice,  cup (1 carb serving).   · 1 small orange (1 carb serving).   Evening Snack  · 4 reduced fat whole-wheat crackers (1 carb serving).   · 1 tbs peanut butter.   · 12 to 15 grapes (1 carb serving).   MEAL PLAN  Use this worksheet to help you make a daily meal plan based on the 2000 calorie diabetic diet suggestions. The total amount of carbohydrates in your meal or snack is more important than making sure you include all of the food groups at every meal or snack. If you are using this plan to help you control your blood glucose, you may interchange carbohydrate containing foods (dairy, starches, and fruits). Choose a variety of fresh foods of  varying colors and flavors. You can choose from the following foods to build your day's meals:  · 11 Starches.   · 4 Vegetables.   · 3 Fruits.   · 3 Dairy.   · 8 oz Meat.   · Up to 6 Fats.   Your dietician can use this worksheet to help you decide how many servings and what types of foods are right for you.  BREAKFAST  Food Group and Servings / Food Choice  Starches ___________________________________________  Dairy ______________________________________________  Fruit ______________________________________________  Meat ______________________________________________  Fat________________________________________________  LUNCH  Food Group and Servings / Food Choice  Starch _____________________________________________  Meat ______________________________________________  Vegetables _________________________________________  Fruit ______________________________________________  Dairy______________________________________________  Fat________________________________________________  AFTERNOON SNACK  Food Group and Servings / Food Choice  Starch________________________________________________  Meat_________________________________________________  Fruit__________________________________________________  DINNER  Food Group and Servings / Food Choice  Starches ____________________________________________  Meat _______________________________________________  Dairy _______________________________________________  Vegetables __________________________________________  Fruit ________________________________________________  Fat_________________________________________________  EVENING SNACK  Food Group and Servings / Food Choice  Fruit _______________________________________________  Meat _______________________________________________  Starch ______________________________________________  DAILY TOTALS  Starches ________________________  Vegetables ______________________  Fruit ___________________________  Dairy  ___________________________  Meat ___________________________  Fat _____________________________  Document Released: 07/10/2006 Document Revised: 03/11/2013 Document Reviewed: 07/26/2010  Exit[employer]® Patient Information ©2013 [facility], [facility].
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
## 2018-10-25 NOTE — PROGRESS NOTES
"Pt discharged with floor RN. Floor RN walked patient down to cab, 2 cab vouchers given. Pt provided with 2 lunch boxes. PIV removed prior to discharge. Pt to  Rx at [facility] and then another cab voucher to shelter. Pt very tearful upon discharge \"I am so scared, I don't have anyone.\" This RN provided support to patient. Discharge paperwork signed and in patient chart.   "

## 2018-10-25 NOTE — PROGRESS NOTES
Diabetes education: met with patient this afternoon. Please see consult note.  Plan:  Pt was given and instructed on a One touch verio meter. CDE called [facility] pharmacy again to confirm there was a prescription there for strips. He did have one for One touch ultra test strips but it was outdated. CDE does not have any ultra's to sample. Pharmacy said they will message his provider to send in prescription for verio and if not covered will change to ultra and give meter as well. No further needs at this time. Please call [phone] if needs change.  Pt may benefit in having transportation to the pharmacy at [facility] to  his medications.

## 2018-11-23 ENCOUNTER — HOSPITAL ENCOUNTER (INPATIENT)
Facility: MEDICAL CENTER | Age: 33
LOS: 5 days | DRG: 638 | End: 2018-11-28
Attending: EMERGENCY MEDICINE | Admitting: INTERNAL MEDICINE
Payer: MEDICAID

## 2018-11-23 DIAGNOSIS — Z91.148 H/O MEDICATION NONCOMPLIANCE: ICD-10-CM

## 2018-11-23 DIAGNOSIS — E10.10 DIABETIC KETOACIDOSIS WITHOUT COMA ASSOCIATED WITH TYPE 1 DIABETES MELLITUS (HCC): ICD-10-CM

## 2018-11-23 DIAGNOSIS — E87.5 HYPERKALEMIA: ICD-10-CM

## 2018-11-23 DIAGNOSIS — E10.65 TYPE 1 DIABETES MELLITUS WITH HYPERGLYCEMIA (HCC): ICD-10-CM

## 2018-11-23 DIAGNOSIS — E10.42 DIABETIC POLYNEUROPATHY ASSOCIATED WITH TYPE 1 DIABETES MELLITUS (HCC): ICD-10-CM

## 2018-11-23 DIAGNOSIS — Z59.00 HOMELESSNESS: ICD-10-CM

## 2018-11-23 PROBLEM — Z78.9 NEED FOR FOLLOW-UP BY SOCIAL WORKER: Status: ACTIVE | Noted: 2018-11-23

## 2018-11-23 PROBLEM — F32.A DEPRESSION: Status: ACTIVE | Noted: 2018-11-23

## 2018-11-23 PROBLEM — S61.209A FINGER WOUND, SIMPLE, OPEN, INITIAL ENCOUNTER: Status: ACTIVE | Noted: 2018-11-23

## 2018-11-23 LAB
ALBUMIN SERPL BCP-MCNC: 4 G/DL (ref 3.2–4.9)
ALBUMIN/GLOB SERPL: 1.1 G/DL
ALP SERPL-CCNC: 114 U/L (ref 30–99)
ALT SERPL-CCNC: 16 U/L (ref 2–50)
ANION GAP SERPL CALC-SCNC: 12 MMOL/L (ref 0–11.9)
ANION GAP SERPL CALC-SCNC: 13 MMOL/L (ref 0–11.9)
ANION GAP SERPL CALC-SCNC: 13 MMOL/L (ref 0–11.9)
AST SERPL-CCNC: 23 U/L (ref 12–45)
B-OH-BUTYR SERPL-MCNC: 4.05 MMOL/L (ref 0.02–0.27)
BASE EXCESS BLDV CALC-SCNC: -4 MMOL/L
BASOPHILS # BLD AUTO: 0.6 % (ref 0–1.8)
BASOPHILS # BLD: 0.07 K/UL (ref 0–0.12)
BILIRUB SERPL-MCNC: 0.5 MG/DL (ref 0.1–1.5)
BODY TEMPERATURE: ABNORMAL CENTIGRADE
BUN SERPL-MCNC: 18 MG/DL (ref 8–22)
BUN SERPL-MCNC: 21 MG/DL (ref 8–22)
BUN SERPL-MCNC: 22 MG/DL (ref 8–22)
CALCIUM SERPL-MCNC: 7.6 MG/DL (ref 8.5–10.5)
CALCIUM SERPL-MCNC: 7.9 MG/DL (ref 8.5–10.5)
CALCIUM SERPL-MCNC: 8.8 MG/DL (ref 8.5–10.5)
CHLORIDE SERPL-SCNC: 101 MMOL/L (ref 96–112)
CHLORIDE SERPL-SCNC: 84 MMOL/L (ref 96–112)
CHLORIDE SERPL-SCNC: 95 MMOL/L (ref 96–112)
CO2 SERPL-SCNC: 20 MMOL/L (ref 20–33)
CO2 SERPL-SCNC: 22 MMOL/L (ref 20–33)
CO2 SERPL-SCNC: 22 MMOL/L (ref 20–33)
COMMENT 1642: NORMAL
CREAT SERPL-MCNC: 0.97 MG/DL (ref 0.5–1.4)
CREAT SERPL-MCNC: 1.01 MG/DL (ref 0.5–1.4)
CREAT SERPL-MCNC: 1.23 MG/DL (ref 0.5–1.4)
EKG IMPRESSION: NORMAL
EOSINOPHIL # BLD AUTO: 0.04 K/UL (ref 0–0.51)
EOSINOPHIL NFR BLD: 0.3 % (ref 0–6.9)
ERYTHROCYTE [DISTWIDTH] IN BLOOD BY AUTOMATED COUNT: 42.5 FL (ref 35.9–50)
GLOBULIN SER CALC-MCNC: 3.6 G/DL (ref 1.9–3.5)
GLUCOSE BLD-MCNC: 391 MG/DL (ref 65–99)
GLUCOSE BLD-MCNC: 455 MG/DL (ref 65–99)
GLUCOSE BLD-MCNC: 486 MG/DL (ref 65–99)
GLUCOSE BLD-MCNC: >600 MG/DL (ref 65–99)
GLUCOSE SERPL-MCNC: 520 MG/DL (ref 65–99)
GLUCOSE SERPL-MCNC: 699 MG/DL (ref 65–99)
GLUCOSE SERPL-MCNC: 875 MG/DL (ref 65–99)
HCO3 BLDV-SCNC: 23 MMOL/L (ref 24–28)
HCT VFR BLD AUTO: 50.7 % (ref 42–52)
HGB BLD-MCNC: 16.6 G/DL (ref 14–18)
IMM GRANULOCYTES # BLD AUTO: 0.07 K/UL (ref 0–0.11)
IMM GRANULOCYTES NFR BLD AUTO: 0.6 % (ref 0–0.9)
LYMPHOCYTES # BLD AUTO: 1.39 K/UL (ref 1–4.8)
LYMPHOCYTES NFR BLD: 12.1 % (ref 22–41)
MAGNESIUM SERPL-MCNC: 1.8 MG/DL (ref 1.5–2.5)
MAGNESIUM SERPL-MCNC: 2 MG/DL (ref 1.5–2.5)
MCH RBC QN AUTO: 29.9 PG (ref 27–33)
MCHC RBC AUTO-ENTMCNC: 32.7 G/DL (ref 33.7–35.3)
MCV RBC AUTO: 91.4 FL (ref 81.4–97.8)
MONOCYTES # BLD AUTO: 0.43 K/UL (ref 0–0.85)
MONOCYTES NFR BLD AUTO: 3.7 % (ref 0–13.4)
MORPHOLOGY BLD-IMP: NORMAL
NEUTROPHILS # BLD AUTO: 9.48 K/UL (ref 1.82–7.42)
NEUTROPHILS NFR BLD: 82.7 % (ref 44–72)
NRBC # BLD AUTO: 0 K/UL
NRBC BLD-RTO: 0 /100 WBC
PCO2 BLDV: 48.8 MMHG (ref 41–51)
PH BLDV: 7.29 [PH] (ref 7.31–7.45)
PLATELET # BLD AUTO: 352 K/UL (ref 164–446)
PMV BLD AUTO: 10.8 FL (ref 9–12.9)
PO2 BLDV: 24.4 MMHG (ref 25–40)
POTASSIUM SERPL-SCNC: 3.6 MMOL/L (ref 3.6–5.5)
POTASSIUM SERPL-SCNC: 4.1 MMOL/L (ref 3.6–5.5)
POTASSIUM SERPL-SCNC: 4.4 MMOL/L (ref 3.6–5.5)
POTASSIUM SERPL-SCNC: 7.4 MMOL/L (ref 3.6–5.5)
PROT SERPL-MCNC: 7.6 G/DL (ref 6–8.2)
RBC # BLD AUTO: 5.55 M/UL (ref 4.7–6.1)
SAO2 % BLDV: 40.3 %
SODIUM SERPL-SCNC: 118 MMOL/L (ref 135–145)
SODIUM SERPL-SCNC: 130 MMOL/L (ref 135–145)
SODIUM SERPL-SCNC: 134 MMOL/L (ref 135–145)
WBC # BLD AUTO: 11.5 K/UL (ref 4.8–10.8)

## 2018-11-23 PROCEDURE — 700101 HCHG RX REV CODE 250: Performed by: EMERGENCY MEDICINE

## 2018-11-23 PROCEDURE — 82010 KETONE BODYS QUAN: CPT

## 2018-11-23 PROCEDURE — 700102 HCHG RX REV CODE 250 W/ 637 OVERRIDE(OP): Performed by: STUDENT IN AN ORGANIZED HEALTH CARE EDUCATION/TRAINING PROGRAM

## 2018-11-23 PROCEDURE — 82803 BLOOD GASES ANY COMBINATION: CPT

## 2018-11-23 PROCEDURE — 700105 HCHG RX REV CODE 258: Performed by: EMERGENCY MEDICINE

## 2018-11-23 PROCEDURE — 82962 GLUCOSE BLOOD TEST: CPT | Mod: 91

## 2018-11-23 PROCEDURE — 93005 ELECTROCARDIOGRAM TRACING: CPT | Performed by: EMERGENCY MEDICINE

## 2018-11-23 PROCEDURE — A9270 NON-COVERED ITEM OR SERVICE: HCPCS | Performed by: STUDENT IN AN ORGANIZED HEALTH CARE EDUCATION/TRAINING PROGRAM

## 2018-11-23 PROCEDURE — 94640 AIRWAY INHALATION TREATMENT: CPT

## 2018-11-23 PROCEDURE — 96375 TX/PRO/DX INJ NEW DRUG ADDON: CPT

## 2018-11-23 PROCEDURE — 700102 HCHG RX REV CODE 250 W/ 637 OVERRIDE(OP): Performed by: EMERGENCY MEDICINE

## 2018-11-23 PROCEDURE — 99285 EMERGENCY DEPT VISIT HI MDM: CPT

## 2018-11-23 PROCEDURE — 80053 COMPREHEN METABOLIC PANEL: CPT

## 2018-11-23 PROCEDURE — 85025 COMPLETE CBC W/AUTO DIFF WBC: CPT

## 2018-11-23 PROCEDURE — 770020 HCHG ROOM/CARE - TELE (206)

## 2018-11-23 PROCEDURE — 700102 HCHG RX REV CODE 250 W/ 637 OVERRIDE(OP): Performed by: INTERNAL MEDICINE

## 2018-11-23 PROCEDURE — 36415 COLL VENOUS BLD VENIPUNCTURE: CPT

## 2018-11-23 PROCEDURE — 80048 BASIC METABOLIC PNL TOTAL CA: CPT | Mod: 91

## 2018-11-23 PROCEDURE — 96372 THER/PROPH/DIAG INJ SC/IM: CPT

## 2018-11-23 PROCEDURE — 84132 ASSAY OF SERUM POTASSIUM: CPT

## 2018-11-23 PROCEDURE — 83735 ASSAY OF MAGNESIUM: CPT

## 2018-11-23 PROCEDURE — 96361 HYDRATE IV INFUSION ADD-ON: CPT

## 2018-11-23 PROCEDURE — 700105 HCHG RX REV CODE 258: Performed by: INTERNAL MEDICINE

## 2018-11-23 RX ORDER — POLYETHYLENE GLYCOL 3350 17 G/17G
1 POWDER, FOR SOLUTION ORAL
Status: DISCONTINUED | OUTPATIENT
Start: 2018-11-23 | End: 2018-11-28 | Stop reason: HOSPADM

## 2018-11-23 RX ORDER — POTASSIUM CHLORIDE 7.45 MG/ML
10 INJECTION INTRAVENOUS
Status: DISCONTINUED | OUTPATIENT
Start: 2018-11-23 | End: 2018-11-23

## 2018-11-23 RX ORDER — GABAPENTIN 300 MG/1
600 CAPSULE ORAL 3 TIMES DAILY
Status: DISCONTINUED | OUTPATIENT
Start: 2018-11-23 | End: 2018-11-25

## 2018-11-23 RX ORDER — SODIUM CHLORIDE 9 MG/ML
1000 INJECTION, SOLUTION INTRAVENOUS ONCE
Status: COMPLETED | OUTPATIENT
Start: 2018-11-23 | End: 2018-11-23

## 2018-11-23 RX ORDER — INSULIN GLARGINE 100 [IU]/ML
30 INJECTION, SOLUTION SUBCUTANEOUS 2 TIMES DAILY
Status: ON HOLD | COMMUNITY
End: 2018-11-28

## 2018-11-23 RX ORDER — DEXTROSE MONOHYDRATE 25 G/50ML
25 INJECTION, SOLUTION INTRAVENOUS
Status: DISCONTINUED | OUTPATIENT
Start: 2018-11-23 | End: 2018-11-24

## 2018-11-23 RX ORDER — POTASSIUM CHLORIDE 7.45 MG/ML
10 INJECTION INTRAVENOUS
Status: ACTIVE | OUTPATIENT
Start: 2018-11-23 | End: 2018-11-24

## 2018-11-23 RX ORDER — SODIUM CHLORIDE AND POTASSIUM CHLORIDE 150; 900 MG/100ML; MG/100ML
INJECTION, SOLUTION INTRAVENOUS CONTINUOUS
Status: DISCONTINUED | OUTPATIENT
Start: 2018-11-23 | End: 2018-11-23

## 2018-11-23 RX ORDER — SODIUM CHLORIDE 9 MG/ML
1000 INJECTION, SOLUTION INTRAVENOUS ONCE
Status: DISCONTINUED | OUTPATIENT
Start: 2018-11-23 | End: 2018-11-23

## 2018-11-23 RX ORDER — NICOTINE 21 MG/24HR
21 PATCH, TRANSDERMAL 24 HOURS TRANSDERMAL
Status: DISCONTINUED | OUTPATIENT
Start: 2018-11-24 | End: 2018-11-28 | Stop reason: HOSPADM

## 2018-11-23 RX ORDER — BISACODYL 10 MG
10 SUPPOSITORY, RECTAL RECTAL
Status: DISCONTINUED | OUTPATIENT
Start: 2018-11-23 | End: 2018-11-28 | Stop reason: HOSPADM

## 2018-11-23 RX ORDER — AMOXICILLIN 250 MG
2 CAPSULE ORAL 2 TIMES DAILY
Status: DISCONTINUED | OUTPATIENT
Start: 2018-11-23 | End: 2018-11-28 | Stop reason: HOSPADM

## 2018-11-23 RX ORDER — SODIUM CHLORIDE 9 MG/ML
2000 INJECTION, SOLUTION INTRAVENOUS ONCE
Status: COMPLETED | OUTPATIENT
Start: 2018-11-23 | End: 2018-11-23

## 2018-11-23 RX ORDER — ACETAMINOPHEN 325 MG/1
650 TABLET ORAL EVERY 6 HOURS PRN
Status: DISCONTINUED | OUTPATIENT
Start: 2018-11-23 | End: 2018-11-28 | Stop reason: HOSPADM

## 2018-11-23 RX ORDER — INSULIN GLARGINE 100 [IU]/ML
30 INJECTION, SOLUTION SUBCUTANEOUS EVERY EVENING
Status: DISCONTINUED | OUTPATIENT
Start: 2018-11-23 | End: 2018-11-24

## 2018-11-23 RX ORDER — AMITRIPTYLINE HYDROCHLORIDE 10 MG/1
25 TABLET, FILM COATED ORAL NIGHTLY
Status: DISCONTINUED | OUTPATIENT
Start: 2018-11-23 | End: 2018-11-27

## 2018-11-23 RX ORDER — SODIUM CHLORIDE AND POTASSIUM CHLORIDE 150; 450 MG/100ML; MG/100ML
INJECTION, SOLUTION INTRAVENOUS CONTINUOUS
Status: DISCONTINUED | OUTPATIENT
Start: 2018-11-23 | End: 2018-11-24

## 2018-11-23 RX ORDER — HYDROXYZINE HYDROCHLORIDE 25 MG/1
25 TABLET, FILM COATED ORAL 3 TIMES DAILY PRN
Status: DISCONTINUED | OUTPATIENT
Start: 2018-11-23 | End: 2018-11-28 | Stop reason: HOSPADM

## 2018-11-23 RX ADMIN — SODIUM CHLORIDE 2000 ML: 9 INJECTION, SOLUTION INTRAVENOUS at 16:30

## 2018-11-23 RX ADMIN — SODIUM CHLORIDE 1000 ML: 9 INJECTION, SOLUTION INTRAVENOUS at 15:30

## 2018-11-23 RX ADMIN — INSULIN HUMAN 10 UNITS: 100 INJECTION, SOLUTION PARENTERAL at 18:54

## 2018-11-23 RX ADMIN — GABAPENTIN 600 MG: 300 CAPSULE ORAL at 22:34

## 2018-11-23 RX ADMIN — SODIUM CHLORIDE 1000 ML: 9 INJECTION, SOLUTION INTRAVENOUS at 18:59

## 2018-11-23 RX ADMIN — INSULIN GLARGINE 30 UNITS: 100 INJECTION, SOLUTION SUBCUTANEOUS at 19:17

## 2018-11-23 RX ADMIN — ALBUTEROL SULFATE 10 MG: 2.5 SOLUTION RESPIRATORY (INHALATION) at 18:30

## 2018-11-23 SDOH — ECONOMIC STABILITY - HOUSING INSECURITY: HOMELESSNESS UNSPECIFIED: Z59.00

## 2018-11-23 ASSESSMENT — ENCOUNTER SYMPTOMS
DIZZINESS: 1
FLANK PAIN: 0
ABDOMINAL PAIN: 0
SPEECH CHANGE: 0
PHOTOPHOBIA: 0
LOSS OF CONSCIOUSNESS: 0
BLOOD IN STOOL: 0
BACK PAIN: 1
PND: 0
POLYDIPSIA: 0
STRIDOR: 0
NECK PAIN: 0
DOUBLE VISION: 0
TREMORS: 1
SINUS PAIN: 0
CHILLS: 0
VOMITING: 0
SORE THROAT: 1
PALPITATIONS: 0
NAUSEA: 0
BRUISES/BLEEDS EASILY: 0
EYE REDNESS: 0
SHORTNESS OF BREATH: 1
ORTHOPNEA: 0
HALLUCINATIONS: 0
SEIZURES: 1
WHEEZING: 1
NERVOUS/ANXIOUS: 1
FOCAL WEAKNESS: 0
FALLS: 1
MYALGIAS: 0
WEIGHT LOSS: 0
EYE DISCHARGE: 0
HEARTBURN: 0
MEMORY LOSS: 0
BLURRED VISION: 1
INSOMNIA: 0
SPUTUM PRODUCTION: 1
HEMOPTYSIS: 0
CONSTIPATION: 0
TINGLING: 1
FEVER: 0
EYE PAIN: 0
DIARRHEA: 0
SENSORY CHANGE: 0
COUGH: 1
DIAPHORESIS: 1
HEADACHES: 1
CLAUDICATION: 0
WEAKNESS: 1
DEPRESSION: 1

## 2018-11-23 ASSESSMENT — PAIN SCALES - GENERAL: PAINLEVEL_OUTOF10: 10

## 2018-11-23 ASSESSMENT — LIFESTYLE VARIABLES: SUBSTANCE_ABUSE: 1
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
## 2018-11-24 PROBLEM — Z91.148 H/O MEDICATION NONCOMPLIANCE: Status: ACTIVE | Noted: 2018-11-23

## 2018-11-24 PROBLEM — E83.42 HYPOMAGNESEMIA: Status: ACTIVE | Noted: 2018-11-24

## 2018-11-24 PROBLEM — E87.6 HYPOKALEMIA: Status: ACTIVE | Noted: 2018-11-24

## 2018-11-24 LAB
ALBUMIN SERPL BCP-MCNC: 3 G/DL (ref 3.2–4.9)
ALBUMIN/GLOB SERPL: 1.2 G/DL
ALP SERPL-CCNC: 72 U/L (ref 30–99)
ALT SERPL-CCNC: 13 U/L (ref 2–50)
ANION GAP SERPL CALC-SCNC: 7 MMOL/L (ref 0–11.9)
ANION GAP SERPL CALC-SCNC: 7 MMOL/L (ref 0–11.9)
AST SERPL-CCNC: 7 U/L (ref 12–45)
BASOPHILS # BLD AUTO: 0.5 % (ref 0–1.8)
BASOPHILS # BLD: 0.04 K/UL (ref 0–0.12)
BILIRUB SERPL-MCNC: 0.4 MG/DL (ref 0.1–1.5)
BUN SERPL-MCNC: 14 MG/DL (ref 8–22)
BUN SERPL-MCNC: 15 MG/DL (ref 8–22)
CALCIUM SERPL-MCNC: 7.8 MG/DL (ref 8.5–10.5)
CALCIUM SERPL-MCNC: 7.8 MG/DL (ref 8.5–10.5)
CHLORIDE SERPL-SCNC: 103 MMOL/L (ref 96–112)
CHLORIDE SERPL-SCNC: 105 MMOL/L (ref 96–112)
CO2 SERPL-SCNC: 22 MMOL/L (ref 20–33)
CO2 SERPL-SCNC: 23 MMOL/L (ref 20–33)
CREAT SERPL-MCNC: 0.63 MG/DL (ref 0.5–1.4)
CREAT SERPL-MCNC: 0.77 MG/DL (ref 0.5–1.4)
EOSINOPHIL # BLD AUTO: 0.16 K/UL (ref 0–0.51)
EOSINOPHIL NFR BLD: 1.8 % (ref 0–6.9)
ERYTHROCYTE [DISTWIDTH] IN BLOOD BY AUTOMATED COUNT: 39.7 FL (ref 35.9–50)
EST. AVERAGE GLUCOSE BLD GHB EST-MCNC: 410 MG/DL
GLOBULIN SER CALC-MCNC: 2.5 G/DL (ref 1.9–3.5)
GLUCOSE BLD-MCNC: 168 MG/DL (ref 65–99)
GLUCOSE BLD-MCNC: 300 MG/DL (ref 65–99)
GLUCOSE BLD-MCNC: 337 MG/DL (ref 65–99)
GLUCOSE BLD-MCNC: 374 MG/DL (ref 65–99)
GLUCOSE BLD-MCNC: 386 MG/DL (ref 65–99)
GLUCOSE BLD-MCNC: 449 MG/DL (ref 65–99)
GLUCOSE SERPL-MCNC: 278 MG/DL (ref 65–99)
GLUCOSE SERPL-MCNC: 391 MG/DL (ref 65–99)
HBA1C MFR BLD: 15.9 % (ref 0–5.6)
HCT VFR BLD AUTO: 36.6 % (ref 42–52)
HGB BLD-MCNC: 12.6 G/DL (ref 14–18)
IMM GRANULOCYTES # BLD AUTO: 0.04 K/UL (ref 0–0.11)
IMM GRANULOCYTES NFR BLD AUTO: 0.5 % (ref 0–0.9)
LYMPHOCYTES # BLD AUTO: 2.15 K/UL (ref 1–4.8)
LYMPHOCYTES NFR BLD: 24.3 % (ref 22–41)
MCH RBC QN AUTO: 30.1 PG (ref 27–33)
MCHC RBC AUTO-ENTMCNC: 34.4 G/DL (ref 33.7–35.3)
MCV RBC AUTO: 87.4 FL (ref 81.4–97.8)
MONOCYTES # BLD AUTO: 0.5 K/UL (ref 0–0.85)
MONOCYTES NFR BLD AUTO: 5.7 % (ref 0–13.4)
NEUTROPHILS # BLD AUTO: 5.95 K/UL (ref 1.82–7.42)
NEUTROPHILS NFR BLD: 67.2 % (ref 44–72)
NRBC # BLD AUTO: 0 K/UL
NRBC BLD-RTO: 0 /100 WBC
PHOSPHATE SERPL-MCNC: 2.7 MG/DL (ref 2.5–4.5)
PLATELET # BLD AUTO: 299 K/UL (ref 164–446)
PMV BLD AUTO: 9.9 FL (ref 9–12.9)
POTASSIUM SERPL-SCNC: 3.7 MMOL/L (ref 3.6–5.5)
POTASSIUM SERPL-SCNC: 4.3 MMOL/L (ref 3.6–5.5)
PROT SERPL-MCNC: 5.5 G/DL (ref 6–8.2)
RBC # BLD AUTO: 4.19 M/UL (ref 4.7–6.1)
SODIUM SERPL-SCNC: 133 MMOL/L (ref 135–145)
SODIUM SERPL-SCNC: 134 MMOL/L (ref 135–145)
WBC # BLD AUTO: 8.8 K/UL (ref 4.8–10.8)

## 2018-11-24 PROCEDURE — A9270 NON-COVERED ITEM OR SERVICE: HCPCS | Performed by: STUDENT IN AN ORGANIZED HEALTH CARE EDUCATION/TRAINING PROGRAM

## 2018-11-24 PROCEDURE — 96365 THER/PROPH/DIAG IV INF INIT: CPT

## 2018-11-24 PROCEDURE — 36415 COLL VENOUS BLD VENIPUNCTURE: CPT

## 2018-11-24 PROCEDURE — 83036 HEMOGLOBIN GLYCOSYLATED A1C: CPT

## 2018-11-24 PROCEDURE — 700111 HCHG RX REV CODE 636 W/ 250 OVERRIDE (IP): Performed by: STUDENT IN AN ORGANIZED HEALTH CARE EDUCATION/TRAINING PROGRAM

## 2018-11-24 PROCEDURE — 96366 THER/PROPH/DIAG IV INF ADDON: CPT

## 2018-11-24 PROCEDURE — 700105 HCHG RX REV CODE 258: Performed by: STUDENT IN AN ORGANIZED HEALTH CARE EDUCATION/TRAINING PROGRAM

## 2018-11-24 PROCEDURE — 700111 HCHG RX REV CODE 636 W/ 250 OVERRIDE (IP): Performed by: INTERNAL MEDICINE

## 2018-11-24 PROCEDURE — 96372 THER/PROPH/DIAG INJ SC/IM: CPT

## 2018-11-24 PROCEDURE — 96361 HYDRATE IV INFUSION ADD-ON: CPT

## 2018-11-24 PROCEDURE — 99223 1ST HOSP IP/OBS HIGH 75: CPT | Mod: GC | Performed by: INTERNAL MEDICINE

## 2018-11-24 PROCEDURE — 85025 COMPLETE CBC W/AUTO DIFF WBC: CPT

## 2018-11-24 PROCEDURE — 700102 HCHG RX REV CODE 250 W/ 637 OVERRIDE(OP): Performed by: STUDENT IN AN ORGANIZED HEALTH CARE EDUCATION/TRAINING PROGRAM

## 2018-11-24 PROCEDURE — 90686 IIV4 VACC NO PRSV 0.5 ML IM: CPT | Performed by: INTERNAL MEDICINE

## 2018-11-24 PROCEDURE — 82962 GLUCOSE BLOOD TEST: CPT | Mod: 91

## 2018-11-24 PROCEDURE — 90471 IMMUNIZATION ADMIN: CPT

## 2018-11-24 PROCEDURE — 80053 COMPREHEN METABOLIC PANEL: CPT

## 2018-11-24 PROCEDURE — 700102 HCHG RX REV CODE 250 W/ 637 OVERRIDE(OP): Performed by: INTERNAL MEDICINE

## 2018-11-24 PROCEDURE — 3E02340 INTRODUCTION OF INFLUENZA VACCINE INTO MUSCLE, PERCUTANEOUS APPROACH: ICD-10-PCS | Performed by: INTERNAL MEDICINE

## 2018-11-24 PROCEDURE — 84100 ASSAY OF PHOSPHORUS: CPT

## 2018-11-24 PROCEDURE — 700101 HCHG RX REV CODE 250: Performed by: STUDENT IN AN ORGANIZED HEALTH CARE EDUCATION/TRAINING PROGRAM

## 2018-11-24 PROCEDURE — 770020 HCHG ROOM/CARE - TELE (206)

## 2018-11-24 RX ORDER — INSULIN GLARGINE 100 [IU]/ML
15 INJECTION, SOLUTION SUBCUTANEOUS EVERY EVENING
Status: DISCONTINUED | OUTPATIENT
Start: 2018-11-24 | End: 2018-11-25

## 2018-11-24 RX ORDER — CALCIUM CARBONATE 500 MG/1
500 TABLET, CHEWABLE ORAL DAILY
Status: DISCONTINUED | OUTPATIENT
Start: 2018-11-24 | End: 2018-11-24

## 2018-11-24 RX ORDER — POTASSIUM CHLORIDE 20 MEQ/1
20 TABLET, EXTENDED RELEASE ORAL ONCE
Status: COMPLETED | OUTPATIENT
Start: 2018-11-24 | End: 2018-11-24

## 2018-11-24 RX ORDER — DEXTROSE MONOHYDRATE 25 G/50ML
25 INJECTION, SOLUTION INTRAVENOUS
Status: DISCONTINUED | OUTPATIENT
Start: 2018-11-24 | End: 2018-11-24

## 2018-11-24 RX ORDER — INSULIN GLARGINE 100 [IU]/ML
30 INJECTION, SOLUTION SUBCUTANEOUS
Status: DISCONTINUED | OUTPATIENT
Start: 2018-11-24 | End: 2018-11-24

## 2018-11-24 RX ORDER — INSULIN GLARGINE 100 [IU]/ML
30 INJECTION, SOLUTION SUBCUTANEOUS
Status: DISCONTINUED | OUTPATIENT
Start: 2018-11-24 | End: 2018-11-25

## 2018-11-24 RX ORDER — MAGNESIUM SULFATE HEPTAHYDRATE 40 MG/ML
2 INJECTION, SOLUTION INTRAVENOUS ONCE
Status: COMPLETED | OUTPATIENT
Start: 2018-11-24 | End: 2018-11-24

## 2018-11-24 RX ORDER — DEXTROSE MONOHYDRATE 25 G/50ML
25 INJECTION, SOLUTION INTRAVENOUS
Status: DISCONTINUED | OUTPATIENT
Start: 2018-11-24 | End: 2018-11-25

## 2018-11-24 RX ORDER — SODIUM CHLORIDE 9 MG/ML
INJECTION, SOLUTION INTRAVENOUS CONTINUOUS
Status: DISCONTINUED | OUTPATIENT
Start: 2018-11-24 | End: 2018-11-25

## 2018-11-24 RX ADMIN — GABAPENTIN 600 MG: 300 CAPSULE ORAL at 13:57

## 2018-11-24 RX ADMIN — POTASSIUM CHLORIDE AND SODIUM CHLORIDE: 450; 150 INJECTION, SOLUTION INTRAVENOUS at 00:13

## 2018-11-24 RX ADMIN — SODIUM CHLORIDE: 9 INJECTION, SOLUTION INTRAVENOUS at 09:58

## 2018-11-24 RX ADMIN — INSULIN LISPRO 6 UNITS: 100 INJECTION, SOLUTION INTRAVENOUS; SUBCUTANEOUS at 12:40

## 2018-11-24 RX ADMIN — AMITRIPTYLINE HYDROCHLORIDE 25 MG: 10 TABLET, FILM COATED ORAL at 20:39

## 2018-11-24 RX ADMIN — INSULIN LISPRO 5 UNITS: 100 INJECTION, SOLUTION INTRAVENOUS; SUBCUTANEOUS at 17:12

## 2018-11-24 RX ADMIN — INSULIN HUMAN 10 UNITS: 100 INJECTION, SOLUTION PARENTERAL at 15:42

## 2018-11-24 RX ADMIN — ENOXAPARIN SODIUM 40 MG: 100 INJECTION SUBCUTANEOUS at 06:31

## 2018-11-24 RX ADMIN — INFLUENZA A VIRUS A/MICHIGAN/45/2015 X-275 (H1N1) ANTIGEN (FORMALDEHYDE INACTIVATED), INFLUENZA A VIRUS A/SINGAPORE/INFIMH-16-0019/2016 IVR-186 (H3N2) ANTIGEN (FORMALDEHYDE INACTIVATED), INFLUENZA B VIRUS B/PHUKET/3073/2013 ANTIGEN (FORMALDEHYDE INACTIVATED), AND INFLUENZA B VIRUS B/MARYLAND/15/2016 BX-69A ANTIGEN (FORMALDEHYDE INACTIVATED) 0.5 ML: 15; 15; 15; 15 INJECTION, SUSPENSION INTRAMUSCULAR at 12:42

## 2018-11-24 RX ADMIN — HYDROXYZINE HYDROCHLORIDE 25 MG: 25 TABLET, FILM COATED ORAL at 20:40

## 2018-11-24 RX ADMIN — INSULIN LISPRO 1 UNITS: 100 INJECTION, SOLUTION INTRAVENOUS; SUBCUTANEOUS at 09:43

## 2018-11-24 RX ADMIN — GABAPENTIN 600 MG: 300 CAPSULE ORAL at 20:39

## 2018-11-24 RX ADMIN — GABAPENTIN 600 MG: 300 CAPSULE ORAL at 06:29

## 2018-11-24 RX ADMIN — MAGNESIUM SULFATE IN WATER 2 G: 40 INJECTION, SOLUTION INTRAVENOUS at 04:51

## 2018-11-24 RX ADMIN — INSULIN GLARGINE 15 UNITS: 100 INJECTION, SOLUTION SUBCUTANEOUS at 17:46

## 2018-11-24 RX ADMIN — SODIUM CHLORIDE: 9 INJECTION, SOLUTION INTRAVENOUS at 18:25

## 2018-11-24 RX ADMIN — POTASSIUM CHLORIDE AND SODIUM CHLORIDE: 450; 150 INJECTION, SOLUTION INTRAVENOUS at 07:10

## 2018-11-24 RX ADMIN — NICOTINE 21 MG: 21 PATCH, EXTENDED RELEASE TRANSDERMAL at 06:30

## 2018-11-24 RX ADMIN — POTASSIUM CHLORIDE 20 MEQ: 1500 TABLET, EXTENDED RELEASE ORAL at 08:56

## 2018-11-24 RX ADMIN — INSULIN LISPRO 4 UNITS: 100 INJECTION, SOLUTION INTRAVENOUS; SUBCUTANEOUS at 20:48

## 2018-11-24 RX ADMIN — INSULIN GLARGINE 30 UNITS: 100 INJECTION, SOLUTION SUBCUTANEOUS at 12:42

## 2018-11-24 ASSESSMENT — COGNITIVE AND FUNCTIONAL STATUS - GENERAL
SUGGESTED CMS G CODE MODIFIER DAILY ACTIVITY: CH
DAILY ACTIVITIY SCORE: 24
MOBILITY SCORE: 24
SUGGESTED CMS G CODE MODIFIER MOBILITY: CH

## 2018-11-24 ASSESSMENT — PATIENT HEALTH QUESTIONNAIRE - PHQ9
1. LITTLE INTEREST OR PLEASURE IN DOING THINGS: NOT AT ALL
SUM OF ALL RESPONSES TO PHQ9 QUESTIONS 1 AND 2: 0
2. FEELING DOWN, DEPRESSED, IRRITABLE, OR HOPELESS: NOT AT ALL

## 2018-11-24 ASSESSMENT — ENCOUNTER SYMPTOMS
DIARRHEA: 0
COUGH: 0
HEADACHES: 0
NAUSEA: 0
ABDOMINAL PAIN: 0
CHILLS: 0
DIZZINESS: 0
PALPITATIONS: 0
SHORTNESS OF BREATH: 0
VOMITING: 0
FEVER: 0
HALLUCINATIONS: 0
NERVOUS/ANXIOUS: 0

## 2018-11-24 ASSESSMENT — COPD QUESTIONNAIRES
DURING THE PAST 4 WEEKS HOW MUCH DID YOU FEEL SHORT OF BREATH: NONE/LITTLE OF THE TIME
IN THE PAST 12 MONTHS DO YOU DO LESS THAN YOU USED TO BECAUSE OF YOUR BREATHING PROBLEMS: DISAGREE/UNSURE
DO YOU EVER COUGH UP ANY MUCUS OR PHLEGM?: NO/ONLY WITH OCCASIONAL COLDS OR INFECTIONS
COPD SCREENING SCORE: 0
HAVE YOU SMOKED AT LEAST 100 CIGARETTES IN YOUR ENTIRE LIFE: NO/DON'T KNOW

## 2018-11-24 ASSESSMENT — PAIN SCALES - GENERAL
PAINLEVEL_OUTOF10: 0
PAINLEVEL_OUTOF10: 10
PAINLEVEL_OUTOF10: 5
PAINLEVEL_OUTOF10: 0
PAINLEVEL_OUTOF10: 10

## 2018-11-24 ASSESSMENT — LIFESTYLE VARIABLES
ALCOHOL_USE: NO
EVER_SMOKED: YES

## 2018-11-24 NOTE — PROGRESS NOTES
Bedside report received from CHARIS Dahl. Pt ambulated with standby assist from the gurney to the bed. On RA. Oriented to room, call light, bed controls, and phone. Bed alarm in place. Denies c/o pain. Asking for food and Sprite Zero. IVFs infusing. Bed rails up x2. Call light, phone, and bedside table within reach. Will continue to monitor.
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
## 2018-11-24 NOTE — ED NOTES
Med rec complete per pt at bedside   Pt has not had any of his meds for over 3 weeks   NKDA  No oral ABX within last 30 days

## 2018-11-24 NOTE — ASSESSMENT & PLAN NOTE
-AG 12 -> 13, beta-hydroxybutyric acid 4.05, trace ketones in urine on admission.  -2/2 DKA - glucose 875 on admission.  -AG closed with insulin regimen.  -Resolved.

## 2018-11-24 NOTE — ASSESSMENT & PLAN NOTE
-AG 12 -> 13, beta-hydroxybutyric acid 4.05, trace ketones in urine, glucose 875 on admission.  -AG closed with insulin regimen.  -Resolved.

## 2018-11-24 NOTE — ED NOTES
Report received.  Assumed care.  Pt assessed, A&O x 4.  Educated on fall precautions and pt verbalizes understanding.  Call light and monitor within reach, bed in lowest position.  Will continue to monitor.    Per ICU MD, pt ok to eat diabetic diet.  Administered lantus per MD order.
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
## 2018-11-24 NOTE — PROGRESS NOTES
Notified resident MD of pt's elevated blood sugar. Per MD, RN to recheck blood sugar in one hour and call her with the result. Will continue to monitor.

## 2018-11-24 NOTE — ED NOTES
Per [facility] MD, hold off on starting IV Potassium replacement at this time as pt's K=4.1.  Do begin 1/2 NS +20K infusion per MAR order.

## 2018-11-24 NOTE — ED NOTES
Evelyn from Lab called with critical result of NA of 118, potassium of 7.4, and glucose of 875 at 1812. Critical lab result read back to Evelyn.   Dr. Burnham notified of critical lab result at 1814.  Critical lab result read back by Dr. Burnham.

## 2018-11-24 NOTE — SENIOR ADMIT NOTE
"Senior Admit Note    History  Paras Lamb is a 32 y.o. male with a history of T1DM, hypertension, and seizures, presented for nausea, vomiting, malaise, found to be in diabetic ketoacidosis. Patient has multiple visits for similar. He states he has not taken his insulin for 1 week. Denies chest pain, abdominal pain, fever, chills, cough, loss of consciousness, rash or new skin changes. Patient asking about breakfast and his options for types of food.    In the ED initial labs included: glucose 875, bicarb 22, corrected Na 144, K 7.4, gap 12, BHB 4.05, Cr 1.23, VBG pH 7.29. ICU team was initially called for admission, however recommended floor admission. Patient received NS 1L+1L+2L, insulin regular 10 units, insulin glargine 30 units, albuterol neb. Repeat labs were glucose 391, corrected Na 140, K 4.3, gap 7.0, bicarb 23, Cr 0.77.    /84   Pulse 85   Temp 35.9 °C (96.6 °F) (Temporal)   Resp 12   Ht 1.778 m (5' 10\")   Wt 67.1 kg (147 lb 14.9 oz)   SpO2 98%   BMI 21.23 kg/m²     Exam  General: Sleeping, arousal, answering questions.  Eyes: Extraocular movements grossly intact.  Throat: No erythema or exudates noted.  Neck: Supple. No lymphadenopathy noted.   Lungs: Clear to auscultation bilaterally.  Cardiovascular: Tachycardic. Regular rhythm.  Abdomen: Soft, non-tender, non-distended.  Extremities: No cyanosis or edema.  Skin: Multiple tattoos.  Neurological: Alert and oriented.  Psychiatric: Normal mood and affect. Denies suicidal or homicidal ideation.    Assessment  Diabetic ketoacidosis  Acute kidney injury  Medication non-compliance  Electrolyte disturbances    DKA due to medication non-compliance. No severe acidemia on presentation. Significant improvement of glucose, volume status, and LEOBARDO with intervention thus far.     Plan  1/2 NS with 20 mEq KCl  BG q2h  Insulin glargine 30 units nightly (half home dose)  Insulin sliding scale  Monitor chem, trend and replete K/PO4/Mg  Monitor " I/Os    Please see Dr. Cabrera's H&P for full detals

## 2018-11-24 NOTE — ASSESSMENT & PLAN NOTE
-Medicaid came to assess patient on 11/27/2018 and found patient appropriate for transitional group home for management of DM due to multiple admissions for DKA.  -Discharged to group home.

## 2018-11-24 NOTE — ASSESSMENT & PLAN NOTE
The patient has severe electrolyte abnormality secondary to diabetic ketoacidosis, testing at presentation was 7.4 and sodium was 118, blood glucose was 875.  The hydroxy butyrate is 4.05  Plan  -Every 2 hours basic metabolic panel and replete electrolytes as needed

## 2018-11-24 NOTE — ED NOTES
Spoke with hospitalist, okay [facility] do [facility].

## 2018-11-24 NOTE — PROGRESS NOTES
Pt returned to room from HD. Very drowsy. Awakens to voice, then falls back to sleep. Changed linens under pt. Was on 10L O2 Oximask upon return to room; have since weaned pt back down to 3L O2 NC satting 96%. Holding any PO until pt awakens fully. Will continue to monitor.

## 2018-11-24 NOTE — ASSESSMENT & PLAN NOTE
-10/14/2018 HbA1c 15.7 - poor control of DM.  -Patient reports being on 30 units of Basaglar, lispro sliding scale (2 units for BG >150, 4 units for BG >200, etc.) prior to admission.  -Insulin regimen uptitrated per BGs during hospitalization.  -Discharged with prescriptions for glargine, lispro (scheduled and sliding scale), blood glucose monitoring supplies, gabapentin, and duloxetine.

## 2018-11-24 NOTE — PROGRESS NOTES
Assumed care of patient. Transferred to floor. Gave report to Lashay VIZCAINO due to change of shift. Patient resting in bed, call light in reach. Bed alarm on.
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
## 2018-11-25 LAB
ANION GAP SERPL CALC-SCNC: 5 MMOL/L (ref 0–11.9)
BUN SERPL-MCNC: 15 MG/DL (ref 8–22)
CALCIUM SERPL-MCNC: 8.4 MG/DL (ref 8.5–10.5)
CHLORIDE SERPL-SCNC: 101 MMOL/L (ref 96–112)
CO2 SERPL-SCNC: 25 MMOL/L (ref 20–33)
CREAT SERPL-MCNC: 0.78 MG/DL (ref 0.5–1.4)
GLUCOSE BLD-MCNC: 321 MG/DL (ref 65–99)
GLUCOSE BLD-MCNC: 350 MG/DL (ref 65–99)
GLUCOSE BLD-MCNC: 406 MG/DL (ref 65–99)
GLUCOSE BLD-MCNC: 426 MG/DL (ref 65–99)
GLUCOSE SERPL-MCNC: 311 MG/DL (ref 65–99)
POTASSIUM SERPL-SCNC: 4.2 MMOL/L (ref 3.6–5.5)
SODIUM SERPL-SCNC: 131 MMOL/L (ref 135–145)

## 2018-11-25 PROCEDURE — 700102 HCHG RX REV CODE 250 W/ 637 OVERRIDE(OP): Performed by: STUDENT IN AN ORGANIZED HEALTH CARE EDUCATION/TRAINING PROGRAM

## 2018-11-25 PROCEDURE — A9270 NON-COVERED ITEM OR SERVICE: HCPCS | Performed by: STUDENT IN AN ORGANIZED HEALTH CARE EDUCATION/TRAINING PROGRAM

## 2018-11-25 PROCEDURE — 700105 HCHG RX REV CODE 258: Performed by: STUDENT IN AN ORGANIZED HEALTH CARE EDUCATION/TRAINING PROGRAM

## 2018-11-25 PROCEDURE — 99232 SBSQ HOSP IP/OBS MODERATE 35: CPT | Mod: GC | Performed by: INTERNAL MEDICINE

## 2018-11-25 PROCEDURE — 82962 GLUCOSE BLOOD TEST: CPT | Mod: 91

## 2018-11-25 PROCEDURE — 770020 HCHG ROOM/CARE - TELE (206)

## 2018-11-25 PROCEDURE — 36415 COLL VENOUS BLD VENIPUNCTURE: CPT

## 2018-11-25 PROCEDURE — 80048 BASIC METABOLIC PNL TOTAL CA: CPT

## 2018-11-25 PROCEDURE — 700111 HCHG RX REV CODE 636 W/ 250 OVERRIDE (IP): Performed by: STUDENT IN AN ORGANIZED HEALTH CARE EDUCATION/TRAINING PROGRAM

## 2018-11-25 RX ORDER — INSULIN GLARGINE 100 [IU]/ML
30 INJECTION, SOLUTION SUBCUTANEOUS 2 TIMES DAILY
Status: DISCONTINUED | OUTPATIENT
Start: 2018-11-25 | End: 2018-11-26

## 2018-11-25 RX ORDER — DEXTROSE MONOHYDRATE 25 G/50ML
25 INJECTION, SOLUTION INTRAVENOUS
Status: DISCONTINUED | OUTPATIENT
Start: 2018-11-25 | End: 2018-11-26

## 2018-11-25 RX ADMIN — INSULIN GLARGINE 30 UNITS: 100 INJECTION, SOLUTION SUBCUTANEOUS at 11:56

## 2018-11-25 RX ADMIN — INSULIN LISPRO 4 UNITS: 100 INJECTION, SOLUTION INTRAVENOUS; SUBCUTANEOUS at 08:57

## 2018-11-25 RX ADMIN — INSULIN LISPRO 6 UNITS: 100 INJECTION, SOLUTION INTRAVENOUS; SUBCUTANEOUS at 11:57

## 2018-11-25 RX ADMIN — GABAPENTIN 700 MG: 400 CAPSULE ORAL at 21:19

## 2018-11-25 RX ADMIN — ENOXAPARIN SODIUM 40 MG: 100 INJECTION SUBCUTANEOUS at 04:51

## 2018-11-25 RX ADMIN — SODIUM CHLORIDE: 9 INJECTION, SOLUTION INTRAVENOUS at 04:51

## 2018-11-25 RX ADMIN — AMITRIPTYLINE HYDROCHLORIDE 25 MG: 10 TABLET, FILM COATED ORAL at 21:18

## 2018-11-25 RX ADMIN — GABAPENTIN 600 MG: 300 CAPSULE ORAL at 14:15

## 2018-11-25 RX ADMIN — INSULIN GLARGINE 30 UNITS: 100 INJECTION, SOLUTION SUBCUTANEOUS at 18:03

## 2018-11-25 RX ADMIN — GABAPENTIN 600 MG: 300 CAPSULE ORAL at 04:50

## 2018-11-25 RX ADMIN — INSULIN LISPRO 9 UNITS: 100 INJECTION, SOLUTION INTRAVENOUS; SUBCUTANEOUS at 18:02

## 2018-11-25 RX ADMIN — INSULIN LISPRO 9 UNITS: 100 INJECTION, SOLUTION INTRAVENOUS; SUBCUTANEOUS at 21:21

## 2018-11-25 RX ADMIN — NICOTINE 21 MG: 21 PATCH, EXTENDED RELEASE TRANSDERMAL at 04:51

## 2018-11-25 ASSESSMENT — ENCOUNTER SYMPTOMS
COUGH: 0
ABDOMINAL PAIN: 0
PALPITATIONS: 0
DIARRHEA: 0
DIZZINESS: 0
NAUSEA: 0
VOMITING: 0
CHILLS: 0
SHORTNESS OF BREATH: 0
FEVER: 0
HALLUCINATIONS: 0
NERVOUS/ANXIOUS: 0
HEADACHES: 0

## 2018-11-25 ASSESSMENT — PAIN SCALES - GENERAL
PAINLEVEL_OUTOF10: 10
PAINLEVEL_OUTOF10: 0
PAINLEVEL_OUTOF10: 10
PAINLEVEL_OUTOF10: 10

## 2018-11-25 NOTE — PROGRESS NOTES
Pt resting in bed. Up to bathroom self. C/o shooting pain in BLE 10/10. Pt very restless- moving his legs constantly. Dr. Lilly notified. Stated she would speak with her senior to see what else can be done for pt's pain. Pt is already receiving neurontin and amitriptyline. Will continue to monitor.
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
## 2018-11-25 NOTE — PROGRESS NOTES
2 RN skin check completed with CHARIS Gr. Pt has an ingrown nail on his left third finger. WOC RN consult placed by MD. All other areas c/d/i.

## 2018-11-25 NOTE — PROGRESS NOTES
Assumed care of patient, received bedside report from day shift RN, Pt A&Ox4, on RA no SOB noted, running NS at 100 ml/hr per mar. Call light within reach, personal belongings within reach.  Bed is locked and in lowest position. Tele monitor on. Hourly rounding in place.

## 2018-11-25 NOTE — PROGRESS NOTES
Pt continues to ask for snacks and soda, explained to pt his BS have been elevated and need to be under control, will continue to educate.

## 2018-11-25 NOTE — CARE PLAN
Problem: Safety  Goal: Will remain free from falls  Outcome: PROGRESSING AS EXPECTED  Fall risk assessed, fall precautions in place, pt up self, uses call light appropriately and pt verbalizes understanding of fall risk.      Problem: Pain Management  Goal: Pain level will decrease to patient's comfort goal  Outcome: PROGRESSING SLOWER THAN EXPECTED  Pt receiving prn pain medication as ordered per MD. Pt also using non pharm methods to decrease pain.

## 2018-11-25 NOTE — PROGRESS NOTES
Bedside report received from CHARIS Ramsey. Pt sleeping in bed. IVFs infusing. Bed rails up x2. Call light, phone, and bedside table within reach. Will continue to monitor.

## 2018-11-26 ENCOUNTER — PATIENT OUTREACH (OUTPATIENT)
Dept: HEALTH INFORMATION MANAGEMENT | Facility: OTHER | Age: 33
End: 2018-11-26

## 2018-11-26 ENCOUNTER — TELEPHONE (OUTPATIENT)
Dept: HEALTH INFORMATION MANAGEMENT | Facility: OTHER | Age: 33
End: 2018-11-26

## 2018-11-26 LAB
ANION GAP SERPL CALC-SCNC: 6 MMOL/L (ref 0–11.9)
BUN SERPL-MCNC: 27 MG/DL (ref 8–22)
CALCIUM SERPL-MCNC: 9.2 MG/DL (ref 8.5–10.5)
CHLORIDE SERPL-SCNC: 100 MMOL/L (ref 96–112)
CO2 SERPL-SCNC: 26 MMOL/L (ref 20–33)
CREAT SERPL-MCNC: 0.73 MG/DL (ref 0.5–1.4)
GLUCOSE BLD-MCNC: 260 MG/DL (ref 65–99)
GLUCOSE BLD-MCNC: 269 MG/DL (ref 65–99)
GLUCOSE BLD-MCNC: 283 MG/DL (ref 65–99)
GLUCOSE BLD-MCNC: 332 MG/DL (ref 65–99)
GLUCOSE BLD-MCNC: 348 MG/DL (ref 65–99)
GLUCOSE SERPL-MCNC: 294 MG/DL (ref 65–99)
POTASSIUM SERPL-SCNC: 4 MMOL/L (ref 3.6–5.5)
SODIUM SERPL-SCNC: 132 MMOL/L (ref 135–145)

## 2018-11-26 PROCEDURE — 82962 GLUCOSE BLOOD TEST: CPT | Mod: 91

## 2018-11-26 PROCEDURE — 80048 BASIC METABOLIC PNL TOTAL CA: CPT

## 2018-11-26 PROCEDURE — 770006 HCHG ROOM/CARE - MED/SURG/GYN SEMI*

## 2018-11-26 PROCEDURE — 700111 HCHG RX REV CODE 636 W/ 250 OVERRIDE (IP): Performed by: STUDENT IN AN ORGANIZED HEALTH CARE EDUCATION/TRAINING PROGRAM

## 2018-11-26 PROCEDURE — 99232 SBSQ HOSP IP/OBS MODERATE 35: CPT | Mod: GC | Performed by: INTERNAL MEDICINE

## 2018-11-26 PROCEDURE — A9270 NON-COVERED ITEM OR SERVICE: HCPCS | Performed by: STUDENT IN AN ORGANIZED HEALTH CARE EDUCATION/TRAINING PROGRAM

## 2018-11-26 PROCEDURE — 700105 HCHG RX REV CODE 258: Performed by: INTERNAL MEDICINE

## 2018-11-26 PROCEDURE — 36415 COLL VENOUS BLD VENIPUNCTURE: CPT

## 2018-11-26 PROCEDURE — 700102 HCHG RX REV CODE 250 W/ 637 OVERRIDE(OP): Performed by: STUDENT IN AN ORGANIZED HEALTH CARE EDUCATION/TRAINING PROGRAM

## 2018-11-26 RX ORDER — DEXTROSE MONOHYDRATE 25 G/50ML
25 INJECTION, SOLUTION INTRAVENOUS
Status: DISCONTINUED | OUTPATIENT
Start: 2018-11-26 | End: 2018-11-26

## 2018-11-26 RX ORDER — DEXTROSE MONOHYDRATE 25 G/50ML
25 INJECTION, SOLUTION INTRAVENOUS
Status: DISCONTINUED | OUTPATIENT
Start: 2018-11-26 | End: 2018-11-27

## 2018-11-26 RX ORDER — INSULIN GLARGINE 100 [IU]/ML
30 INJECTION, SOLUTION SUBCUTANEOUS 2 TIMES DAILY
Status: DISCONTINUED | OUTPATIENT
Start: 2018-11-26 | End: 2018-11-26

## 2018-11-26 RX ORDER — SODIUM CHLORIDE 9 MG/ML
1000 INJECTION, SOLUTION INTRAVENOUS ONCE
Status: COMPLETED | OUTPATIENT
Start: 2018-11-26 | End: 2018-11-26

## 2018-11-26 RX ORDER — INSULIN GLARGINE 100 [IU]/ML
30 INJECTION, SOLUTION SUBCUTANEOUS 2 TIMES DAILY
Status: DISCONTINUED | OUTPATIENT
Start: 2018-11-26 | End: 2018-11-27

## 2018-11-26 RX ORDER — DULOXETIN HYDROCHLORIDE 60 MG/1
60 CAPSULE, DELAYED RELEASE ORAL DAILY
Status: DISCONTINUED | OUTPATIENT
Start: 2018-11-26 | End: 2018-11-28 | Stop reason: HOSPADM

## 2018-11-26 RX ADMIN — ACETAMINOPHEN 650 MG: 325 TABLET, FILM COATED ORAL at 08:49

## 2018-11-26 RX ADMIN — INSULIN LISPRO 4 UNITS: 100 INJECTION, SOLUTION INTRAVENOUS; SUBCUTANEOUS at 18:03

## 2018-11-26 RX ADMIN — GABAPENTIN 700 MG: 400 CAPSULE ORAL at 14:48

## 2018-11-26 RX ADMIN — ENOXAPARIN SODIUM 40 MG: 100 INJECTION SUBCUTANEOUS at 05:23

## 2018-11-26 RX ADMIN — AMITRIPTYLINE HYDROCHLORIDE 25 MG: 10 TABLET, FILM COATED ORAL at 21:17

## 2018-11-26 RX ADMIN — NICOTINE 21 MG: 21 PATCH, EXTENDED RELEASE TRANSDERMAL at 05:23

## 2018-11-26 RX ADMIN — SODIUM CHLORIDE 1000 ML: 9 INJECTION, SOLUTION INTRAVENOUS at 06:24

## 2018-11-26 RX ADMIN — DULOXETINE HYDROCHLORIDE 60 MG: 60 CAPSULE, DELAYED RELEASE ORAL at 14:48

## 2018-11-26 RX ADMIN — INSULIN GLARGINE 30 UNITS: 100 INJECTION, SOLUTION SUBCUTANEOUS at 18:04

## 2018-11-26 RX ADMIN — INSULIN LISPRO 7 UNITS: 100 INJECTION, SOLUTION INTRAVENOUS; SUBCUTANEOUS at 18:06

## 2018-11-26 RX ADMIN — INSULIN LISPRO 4 UNITS: 100 INJECTION, SOLUTION INTRAVENOUS; SUBCUTANEOUS at 21:14

## 2018-11-26 RX ADMIN — INSULIN GLARGINE 30 UNITS: 100 INJECTION, SOLUTION SUBCUTANEOUS at 05:24

## 2018-11-26 RX ADMIN — GABAPENTIN 700 MG: 400 CAPSULE ORAL at 21:17

## 2018-11-26 RX ADMIN — GABAPENTIN 700 MG: 400 CAPSULE ORAL at 05:23

## 2018-11-26 ASSESSMENT — PAIN SCALES - GENERAL
PAINLEVEL_OUTOF10: 4
PAINLEVEL_OUTOF10: 10
PAINLEVEL_OUTOF10: 0
PAINLEVEL_OUTOF10: 10

## 2018-11-26 ASSESSMENT — ENCOUNTER SYMPTOMS
NERVOUS/ANXIOUS: 0
PALPITATIONS: 0
ABDOMINAL PAIN: 0
DIZZINESS: 0
NAUSEA: 0
DIARRHEA: 0
HEADACHES: 0
COUGH: 0
SHORTNESS OF BREATH: 0
VOMITING: 0
CHILLS: 0
HALLUCINATIONS: 0
FEVER: 0

## 2018-11-26 NOTE — DIETARY
Nutrition Services:  Brief Update    Consult received for Diabetes diet education.  Pt is currently on a Diabetic, Consistent Carbohydrate diet and per chart pt PO %.   Ht: 177.8 cm, Wt: 68.3 kg, BMI 21.6.    Provided pt with Type 1 DM diet handouts and explanation.  Pt is currently homeless and stated diet non-compliance d/t lack of food.  Provided pt with [employer] Prescription form.  Consult RD as needed. RD will re-screen weekly.    RD available PRN.

## 2018-11-26 NOTE — WOUND TEAM
Wound consult placed regarding Left hand, 3rd digit. This RN in to assess patient in T724/02. Pt states he thinks he may have gotten something under the cuticle. The area is not red or hot but is raised. Pt was able to squeeze out a drop of purulent discharge. Last WBC check was completed on 11/24/2018 and was WNL at 8.8. This RN unable to express any more drainage. Skin intact, discussed with patient and bedside RNLashay that there is nothing further for wound team to do. Bedside RN to discuss with MD further care such as ABX or consult to surgeon for possible I&D of the area if it is infected. No advanced wound care needs identified. No further follow up unless consulted.

## 2018-11-26 NOTE — PROGRESS NOTES
"2132: paged MD; Awaiting call back  2135: MD Called back, updated MD that pt second FSBS: 406; DR. Prater stated \"just monitor pt blood sugar.\"  "
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
## 2018-11-26 NOTE — CARE PLAN
Problem: Safety  Goal: Will remain free from injury  Outcome: PROGRESSING AS EXPECTED  Pt safety precautions in place; bed in lowest lock position, 2 side rails up, non slip socks on, call light within reach- educated on when and how to use call light.      Problem: Pain Management  Goal: Pain level will decrease to patient's comfort goal   11/25/18 1279   OTHER   Comfort Goal Comfort at Rest;Comfort with Movement;Sleep Comfortably

## 2018-11-26 NOTE — PROGRESS NOTES
Bedside report received from CHARIS Davalos. Pt sleeping. IVFs infusing- NS @ 200 mL/hr x1L. On RA. Bed rails up x2. Call light, phone, and bedside table within reach. Will continue to monitor.

## 2018-11-27 LAB
GLUCOSE BLD-MCNC: 245 MG/DL (ref 65–99)
GLUCOSE BLD-MCNC: 277 MG/DL (ref 65–99)
GLUCOSE BLD-MCNC: 282 MG/DL (ref 65–99)
GLUCOSE BLD-MCNC: 366 MG/DL (ref 65–99)

## 2018-11-27 PROCEDURE — 306637 HCHG MISC ORTHO ITEM RC 0274

## 2018-11-27 PROCEDURE — A9270 NON-COVERED ITEM OR SERVICE: HCPCS | Performed by: STUDENT IN AN ORGANIZED HEALTH CARE EDUCATION/TRAINING PROGRAM

## 2018-11-27 PROCEDURE — 700102 HCHG RX REV CODE 250 W/ 637 OVERRIDE(OP): Performed by: STUDENT IN AN ORGANIZED HEALTH CARE EDUCATION/TRAINING PROGRAM

## 2018-11-27 PROCEDURE — 82962 GLUCOSE BLOOD TEST: CPT | Mod: 91

## 2018-11-27 PROCEDURE — 700111 HCHG RX REV CODE 636 W/ 250 OVERRIDE (IP): Performed by: STUDENT IN AN ORGANIZED HEALTH CARE EDUCATION/TRAINING PROGRAM

## 2018-11-27 PROCEDURE — 99231 SBSQ HOSP IP/OBS SF/LOW 25: CPT | Mod: GC | Performed by: INTERNAL MEDICINE

## 2018-11-27 RX ORDER — DEXTROSE MONOHYDRATE 25 G/50ML
25 INJECTION, SOLUTION INTRAVENOUS
Status: DISCONTINUED | OUTPATIENT
Start: 2018-11-27 | End: 2018-11-28 | Stop reason: HOSPADM

## 2018-11-27 RX ORDER — INSULIN GLARGINE 100 [IU]/ML
30 INJECTION, SOLUTION SUBCUTANEOUS 2 TIMES DAILY
Status: DISCONTINUED | OUTPATIENT
Start: 2018-11-27 | End: 2018-11-28 | Stop reason: HOSPADM

## 2018-11-27 RX ADMIN — DULOXETINE HYDROCHLORIDE 60 MG: 60 CAPSULE, DELAYED RELEASE ORAL at 05:04

## 2018-11-27 RX ADMIN — GABAPENTIN 700 MG: 400 CAPSULE ORAL at 22:11

## 2018-11-27 RX ADMIN — INSULIN LISPRO 3 UNITS: 100 INJECTION, SOLUTION INTRAVENOUS; SUBCUTANEOUS at 08:41

## 2018-11-27 RX ADMIN — NICOTINE 21 MG: 21 PATCH, EXTENDED RELEASE TRANSDERMAL at 05:04

## 2018-11-27 RX ADMIN — INSULIN LISPRO 7 UNITS: 100 INJECTION, SOLUTION INTRAVENOUS; SUBCUTANEOUS at 08:40

## 2018-11-27 RX ADMIN — GABAPENTIN 700 MG: 400 CAPSULE ORAL at 13:32

## 2018-11-27 RX ADMIN — ENOXAPARIN SODIUM 40 MG: 100 INJECTION SUBCUTANEOUS at 05:04

## 2018-11-27 RX ADMIN — INSULIN GLARGINE 30 UNITS: 100 INJECTION, SOLUTION SUBCUTANEOUS at 05:01

## 2018-11-27 RX ADMIN — INSULIN GLARGINE 30 UNITS: 100 INJECTION, SOLUTION SUBCUTANEOUS at 17:04

## 2018-11-27 RX ADMIN — GABAPENTIN 700 MG: 400 CAPSULE ORAL at 05:04

## 2018-11-27 RX ADMIN — STANDARDIZED SENNA CONCENTRATE AND DOCUSATE SODIUM 2 TABLET: 8.6; 5 TABLET, FILM COATED ORAL at 05:04

## 2018-11-27 ASSESSMENT — ENCOUNTER SYMPTOMS
SHORTNESS OF BREATH: 0
DIZZINESS: 0
COUGH: 0
DIARRHEA: 0
HEADACHES: 0
VOMITING: 0
ABDOMINAL PAIN: 0
PALPITATIONS: 0
HALLUCINATIONS: 0
CHILLS: 0
NERVOUS/ANXIOUS: 0
NAUSEA: 0
FEVER: 0

## 2018-11-27 ASSESSMENT — PAIN SCALES - GENERAL
PAINLEVEL_OUTOF10: 0
PAINLEVEL_OUTOF10: 0

## 2018-11-27 NOTE — CARE PLAN
Problem: Safety  Goal: Will remain free from injury  Outcome: PROGRESSING AS EXPECTED  Pt safety precautions in place; bed in lowest lock position, 2 side rails up, non slip socks on, call light within reach- educated on when and how to use call light.      Problem: Pain Management  Goal: Pain level will decrease to patient's comfort goal  Outcome: PROGRESSING AS EXPECTED

## 2018-11-27 NOTE — DISCHARGE PLANNING
Received Choice form at [phone]  Agency/Facility Name: [facility]  Referral sent per Choice form @ [phone]

## 2018-11-27 NOTE — PROGRESS NOTES
Diabetes education: Met with patient this afternoon. Please see consult note.  Plan: Pt will need a new meter either from [facility] (Verio) or [facility] ( ultra) , insulin and pen needles. Pt may need to get some supplies from [facility] if basaglar not yet covered.  Pt states he thought he has an appointment with Shelly at [facility] soon. Pt is asking for assistance with transportation as well. CDE will call . Please call [phone] before discharging patient.
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
## 2018-11-27 NOTE — DISCHARGE PLANNING
Anticipated Discharge Disposition: Group home with HH    Discharge plan Pt has agreed to with LSW and Lizzie Gonzales  - [facility]   1. Send any discharge medications to [facility] - [phone] (phone) or [phone] (fax), [email or web address] (email)  2. Transportation through [facility]  a. Provide MT with the following:  i. Facility Name:  [facility]  ii. Address: [address] [place] [id number]  iii. Phone:    [phone]  3. Please send a discharge packet to include:  a. H&P  b. Discharge Summary  c. Latest attending and specialty notes - especially wound care  d. MAR     Barriers to Discharge: medical clearance     Plan: Pt to discharge to group home by [facility] tomorrow 11/28    Care Transition Team Assessment    Information Source  Orientation : Oriented x 4  Information Given By: Patient    Readmission Evaluation  Is this a readmission?: No    Elopement Risk  Legal Hold: No  Ambulatory or Self Mobile in Wheelchair: Yes  Disoriented: No  Psychiatric Symptoms: None  History of Wandering: No  Elopement this Admit: No  Vocalizing Wanting to Leave: No  Displays Behaviors, Body Language Wanting to Leave: No-Not at Risk for Elopement  Elopement Risk: Not at Risk for Elopement    Interdisciplinary Discharge Planning  Does Admitting Nurse Feel This Could be a Complex Discharge?: Yes  Lives with - Patient's Self Care Capacity: Alone and Able to Care For Self  Patient or legal guardian wants to designate a caregiver (see row info): No  Support Systems: Friends / Neighbors  Housing / Facility: Homeless  Do You Take your Prescribed Medications Regularly: No  Reasons Why Not Taking Medications : Financial Reasons  Able to Return to Previous ADL's: Yes  Mobility Issues: No  Prior Services: None  Assistance Needed: No  Durable Medical Equipment: Not Applicable    Discharge Preparedness  What is your plan after discharge?: Group home  Prior Functional Level: Independent  with Activities of Daily Living, Needs Assist with Medication Management  Difficulity with IADLs: Driving    Functional Assesment  Prior Functional Level: Independent with Activities of Daily Living, Needs Assist with Medication Management    Finances  Financial Barriers to Discharge: Yes  Average Monthly Income: 0 $  Prescription Coverage: Yes    Vision / Hearing Impairment  Vision Impairment : Yes  Right Eye Vision: Impaired, Wears Glasses  Left Eye Vision: Impaired, Wears Glasses  Hearing Impairment : No    Values / Beliefs / Concerns  Values / Beliefs Concerns : No         Domestic Abuse  Have you ever been the victim of abuse or violence?: No  Physical Abuse or Sexual Abuse: No  Verbal Abuse or Emotional Abuse: No  Possible Abuse Reported to:: Not Applicable    Psychological Assessment  History of Substance Abuse: Alcohol  History of Psychiatric Problems: Yes  Non-compliant with Treatment: Yes    Discharge Risks or Barriers  Discharge risks or barriers?: Non-adherence to medication or treatment, Homeless / couch surfing  Patient risk factors: Homeless, Multiple organizational systems involved, Noncompliance    Anticipated Discharge Information  Anticipated discharge disposition: Group home  Discharge Address:  ([address] [place] [address])  Discharge Contact Phone Number:  ([phone])

## 2018-11-27 NOTE — FACE TO FACE
Face to Face Supporting Documentation - Home Health    The encounter with this patient was in whole or in part the primary reason for home health admission.    Date of encounter:   Patient:                    MRN:                       YOB: 2017  Paras Lamb  [id number]  1985     Home health to see patient for:  Skilled Nursing care for assessment, interventions & education, Medical social work consult and Home health aide, registered dietitian consult    Skilled need for:  Exacerbation of Chronic Disease State and Medication Management: Optimization of / management of / continued education for type 1 diabetes    Skilled nursing interventions to include:  Comment: Optimization of / management of / continued education and medication compliance for type 1 diabetes    Homebound status evidenced by:  Have a condition such that leaving his or her home is medically contraindicated. Leaving home requires a considerable and taxing effort. There is a normal inability to leave the home.    Transitional group home for improved management of type 1 diabetes.    Community Physician to provide follow up care: Pcp Pt States None     Optional Interventions? No      I certify the face to face encounter for this home health care referral meets the CMS requirements and the encounter/clinical assessment with the patient was, in whole, or in part, for the medical condition(s) listed above, which is the primary reason for home health care. Based on my clinical findings: the service(s) are medically necessary, support the need for home health care, and the homebound criteria are met.  I certify that this patient has had a face to face encounter by myself.  Rose Lilly M.D. - NPI: [id number]
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
## 2018-11-28 VITALS
SYSTOLIC BLOOD PRESSURE: 141 MMHG | DIASTOLIC BLOOD PRESSURE: 83 MMHG | TEMPERATURE: 97.8 F | RESPIRATION RATE: 19 BRPM | BODY MASS INDEX: 22.28 KG/M2 | OXYGEN SATURATION: 97 % | HEIGHT: 70 IN | HEART RATE: 105 BPM | WEIGHT: 155.65 LBS

## 2018-11-28 LAB
GLUCOSE BLD-MCNC: 341 MG/DL (ref 65–99)
GLUCOSE BLD-MCNC: 401 MG/DL (ref 65–99)

## 2018-11-28 PROCEDURE — 700102 HCHG RX REV CODE 250 W/ 637 OVERRIDE(OP): Performed by: STUDENT IN AN ORGANIZED HEALTH CARE EDUCATION/TRAINING PROGRAM

## 2018-11-28 PROCEDURE — 700111 HCHG RX REV CODE 636 W/ 250 OVERRIDE (IP): Performed by: STUDENT IN AN ORGANIZED HEALTH CARE EDUCATION/TRAINING PROGRAM

## 2018-11-28 PROCEDURE — 82962 GLUCOSE BLOOD TEST: CPT

## 2018-11-28 PROCEDURE — 3E0234Z INTRODUCTION OF SERUM, TOXOID AND VACCINE INTO MUSCLE, PERCUTANEOUS APPROACH: ICD-10-PCS | Performed by: INTERNAL MEDICINE

## 2018-11-28 PROCEDURE — 90471 IMMUNIZATION ADMIN: CPT

## 2018-11-28 PROCEDURE — 99239 HOSP IP/OBS DSCHRG MGMT >30: CPT | Mod: GC | Performed by: INTERNAL MEDICINE

## 2018-11-28 PROCEDURE — 700102 HCHG RX REV CODE 250 W/ 637 OVERRIDE(OP)

## 2018-11-28 PROCEDURE — 700111 HCHG RX REV CODE 636 W/ 250 OVERRIDE (IP): Performed by: INTERNAL MEDICINE

## 2018-11-28 PROCEDURE — A9270 NON-COVERED ITEM OR SERVICE: HCPCS | Performed by: STUDENT IN AN ORGANIZED HEALTH CARE EDUCATION/TRAINING PROGRAM

## 2018-11-28 PROCEDURE — 90670 PCV13 VACCINE IM: CPT | Performed by: INTERNAL MEDICINE

## 2018-11-28 PROCEDURE — A9270 NON-COVERED ITEM OR SERVICE: HCPCS

## 2018-11-28 RX ORDER — INSULIN GLARGINE 100 [IU]/ML
30 INJECTION, SOLUTION SUBCUTANEOUS 2 TIMES DAILY
Qty: 15 PEN | Refills: 3 | Status: ON HOLD | OUTPATIENT
Start: 2018-11-28 | End: 2019-04-07

## 2018-11-28 RX ORDER — GABAPENTIN 100 MG/1
700 CAPSULE ORAL 3 TIMES DAILY
Qty: 90 CAP | Refills: 0 | Status: SHIPPED | OUTPATIENT
Start: 2018-11-28 | End: 2019-04-06

## 2018-11-28 RX ORDER — DULOXETIN HYDROCHLORIDE 60 MG/1
60 CAPSULE, DELAYED RELEASE ORAL DAILY
Qty: 30 CAP | Refills: 0 | Status: SHIPPED | OUTPATIENT
Start: 2018-11-29 | End: 2019-04-06

## 2018-11-28 RX ORDER — GLUCOSAMINE HCL/CHONDROITIN SU 500-400 MG
CAPSULE ORAL
Qty: 120 EACH | Refills: 3 | Status: SHIPPED | OUTPATIENT
Start: 2018-11-28 | End: 2019-04-06

## 2018-11-28 RX ORDER — HYDROXYZINE HYDROCHLORIDE 25 MG/1
25 TABLET, FILM COATED ORAL 3 TIMES DAILY PRN
Qty: 90 TAB | Refills: 0 | Status: SHIPPED | OUTPATIENT
Start: 2018-11-28 | End: 2020-03-20

## 2018-11-28 RX ORDER — LANCETS 30 GAUGE
EACH MISCELLANEOUS
Qty: 120 EACH | Refills: 3 | Status: SHIPPED | OUTPATIENT
Start: 2018-11-28 | End: 2019-04-06

## 2018-11-28 RX ADMIN — GABAPENTIN 700 MG: 400 CAPSULE ORAL at 15:21

## 2018-11-28 RX ADMIN — DULOXETINE HYDROCHLORIDE 60 MG: 60 CAPSULE, DELAYED RELEASE ORAL at 06:22

## 2018-11-28 RX ADMIN — INSULIN GLARGINE 30 UNITS: 100 INJECTION, SOLUTION SUBCUTANEOUS at 08:42

## 2018-11-28 RX ADMIN — GABAPENTIN 700 MG: 400 CAPSULE ORAL at 08:39

## 2018-11-28 RX ADMIN — NICOTINE 21 MG: 21 PATCH, EXTENDED RELEASE TRANSDERMAL at 06:20

## 2018-11-28 RX ADMIN — PNEUMOCOCCAL 13-VALENT CONJUGATE VACCINE 0.5 ML: 2.2; 2.2; 2.2; 2.2; 2.2; 4.4; 2.2; 2.2; 2.2; 2.2; 2.2; 2.2; 2.2 INJECTION, SUSPENSION INTRAMUSCULAR at 06:21

## 2018-11-28 RX ADMIN — ENOXAPARIN SODIUM 40 MG: 100 INJECTION SUBCUTANEOUS at 06:21

## 2018-11-28 ASSESSMENT — PAIN SCALES - GENERAL
PAINLEVEL_OUTOF10: 0

## 2018-11-28 NOTE — PROGRESS NOTES
Pt continuously asking for snacks. Discussed pt's diet orders with him. He verbalizes understanding of his diet. FS done = 397. Pt asks for it to be done again. Repeat = 401. Lunch given.

## 2018-11-28 NOTE — PROGRESS NOTES
Patient arrived to [facility]. Call light with in reach, bed in low and locked position. In no overt distress.

## 2018-11-28 NOTE — CARE PLAN
Problem: Safety  Goal: Will remain free from falls    Intervention: Assess risk factors for falls  Fall precautions in place. Bed in lowest position. Non-skid socks in place. Personal possessions within reach. Mobility sign on door. Bed-alarm on. Call light within reach. Pt educated regarding fall prevention and states understanding.       Problem: Knowledge Deficit  Goal: Knowledge of disease process/condition, treatment plan, diagnostic tests, and medications will improve  Fall precautions in place. Bed in lowest position. Non-skid socks in place. Personal possessions within reach. Mobility sign on door. Call light within reach. Pt educated regarding fall prevention and states understanding.

## 2018-11-28 NOTE — CARE PLAN
Problem: Knowledge Deficit  Goal: Knowledge of disease process/condition, treatment plan, diagnostic tests, and medications will improve  Outcome: PROGRESSING AS EXPECTED  Discussed plan of care for today w/ pt this am, he is A+O, he verbalizes understanding of his plan of care, questions answered. Emotional support given. Will monitor for educational needs. Discussed pt's care with resident today. Discussed pt's care with SW today. Discussed pt's care with Diabetic educator. Anticipating D/C.       Problem: Pain Management  Goal: Pain level will decrease to patient's comfort goal  Outcome: PROGRESSING AS EXPECTED  Assessing every four hrs for pain per protocol; see doc flowsheets, pt denies need for pain medications today.

## 2018-11-28 NOTE — DISCHARGE INSTRUCTIONS
Discharge Instructions    Discharged to group home by medical transportation with escort. Discharged via wheelchair, hospital escort: Yes.  Special equipment needed: Not Applicable    Be sure to schedule a follow-up appointment with your primary care doctor or any specialists as instructed.     Discharge Plan:   Diet Plan: Discussed - diabetic carbohydrate controlled diet  Activity Level: Discussed - as tolerated  Smoking Cessation Offered: Patient Counseled  Confirmed Follow up Appointment: Patient to Call and Schedule Appointment  Confirmed Symptoms Management: Discussed  Medication Reconciliation Updated: Yes  Pneumococcal Vaccine Administered: Given  Influenza Vaccine Indication: Indicated: 9 to 64 years of age  Influenza Vaccine Given - only chart on this line when given: Influenza Vaccine Given     I understand that a diet low in cholesterol, fat, and sodium is recommended for good health. Unless I have been given specific instructions below for another diet, I accept this instruction as my diet prescription.       Special Instructions:   1. Follow up with Primary care MD in 1-2 weeks.   2. Request Primary care MD to make referral to endocrinologist.   3. Ensure taking all medications as scheduled.      · Is patient discharged on Warfarin / Coumadin?   No     Depression / Suicide Risk    As you are discharged from this [facility] facility, it is important to learn how to keep safe from harming yourself.    Recognize the warning signs:  · Abrupt changes in personality, positive or negative- including increase in energy   · Giving away possessions  · Change in eating patterns- significant weight changes-  positive or negative  · Change in sleeping patterns- unable to sleep or sleeping all the time   · Unwillingness or inability to communicate  · Depression  · Unusual sadness, discouragement and loneliness  · Talk of wanting to die  · Neglect of personal appearance   · Rebelliousness- reckless  behavior  · Withdrawal from people/activities they love  · Confusion- inability to concentrate     If you or a loved one observes any of these behaviors or has concerns about self-harm, here's what you can do:  · Talk about it- your feelings and reasons for harming yourself  · Remove any means that you might use to hurt yourself (examples: pills, rope, extension cords, firearm)  · Get professional help from the community (Mental Health, Substance Abuse, psychological counseling)  · Do not be alone:Call your Safe Contact- someone whom you trust who will be there for you.  · Call your local CRISIS HOTLINE 661-4732 or 432-864-9034  · Call your local Children's Mobile Crisis Response Team [place] (725) 169-1323 or www.[employer]  · Call the toll free National Suicide Prevention Hotlines   · National Suicide Prevention Lifeline 975-543-TOVG (7633)  · National Hope Line Network 800-SUICIDE (154-8654)
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
## 2018-11-28 NOTE — PROGRESS NOTES
Received care of pt from NOC RN this am. Pt is A+O. Denies need for pain medications. Breakfast given @ this time.

## 2018-11-29 NOTE — PROGRESS NOTES
Attempted to set up ride with [facility], unsuccessful. Discussed pt's care with Manju RN, . Cab voucher given. RX's faxed to [facility]. Report called to Naren @ [facility]. Discharge instructions given to patient at bedside, verbalizes understanding and states plans for follow-up with PCP. IV cathlon removed. All belongings accounted for, all questions answered at this time. Cab called for pt, pt walked to ER exit. Given packet w/ Rx's to give to Naren on his arrival to [facility]. Pt w/o c/o. In good spirits.

## 2018-11-29 NOTE — PROGRESS NOTES
Diabetes education: Pt seen before discharge and given a One touch verio flex meter and meter set up. Pt denies questions.

## 2018-12-28 LAB — EKG IMPRESSION: NORMAL

## 2019-02-28 ENCOUNTER — OFFICE VISIT (OUTPATIENT)
Dept: NEUROLOGY | Facility: MEDICAL CENTER | Age: 34
End: 2019-02-28
Payer: MEDICAID

## 2019-02-28 VITALS
RESPIRATION RATE: 16 BRPM | HEART RATE: 110 BPM | DIASTOLIC BLOOD PRESSURE: 70 MMHG | TEMPERATURE: 97.8 F | SYSTOLIC BLOOD PRESSURE: 114 MMHG | WEIGHT: 189 LBS | BODY MASS INDEX: 27.06 KG/M2 | OXYGEN SATURATION: 93 % | HEIGHT: 70 IN

## 2019-02-28 DIAGNOSIS — E10.42 TYPE 1 DIABETES MELLITUS WITH DIABETIC POLYNEUROPATHY (HCC): ICD-10-CM

## 2019-02-28 DIAGNOSIS — R56.9 SEIZURE (HCC): ICD-10-CM

## 2019-02-28 DIAGNOSIS — F32.A DEPRESSION, UNSPECIFIED DEPRESSION TYPE: ICD-10-CM

## 2019-02-28 DIAGNOSIS — Z59.00 HOMELESSNESS: ICD-10-CM

## 2019-02-28 PROCEDURE — 99205 OFFICE O/P NEW HI 60 MIN: CPT | Performed by: PSYCHIATRY & NEUROLOGY

## 2019-02-28 RX ORDER — LISINOPRIL 10 MG/1
TABLET ORAL
Refills: 1 | COMMUNITY
Start: 2019-02-21 | End: 2019-04-06

## 2019-02-28 RX ORDER — HYDROXYZINE PAMOATE 25 MG/1
CAPSULE ORAL
Refills: 1 | COMMUNITY
Start: 2019-02-21 | End: 2019-02-28

## 2019-02-28 RX ORDER — IBUPROFEN 600 MG/1
TABLET ORAL
Refills: 0 | COMMUNITY
Start: 2019-02-12 | End: 2019-04-06

## 2019-02-28 RX ORDER — GABAPENTIN 300 MG/1
300 CAPSULE ORAL
Refills: 2 | COMMUNITY
Start: 2019-02-12 | End: 2019-02-28

## 2019-02-28 SDOH — ECONOMIC STABILITY - HOUSING INSECURITY: HOMELESSNESS UNSPECIFIED: Z59.00

## 2019-02-28 ASSESSMENT — ENCOUNTER SYMPTOMS
HALLUCINATIONS: 0
SORE THROAT: 0
ABDOMINAL PAIN: 0
SHORTNESS OF BREATH: 0
FEVER: 0
FALLS: 0
WEIGHT LOSS: 0
EYE DISCHARGE: 0
BRUISES/BLEEDS EASILY: 0

## 2019-02-28 NOTE — LETTER
[facility]   [address], [address]  [place] [address]  Phone: [phone]  Fax: [phone]              Paras Lamb  1985    Encounter Date: 2/28/2019    Roxy Vail M.D.          PROGRESS NOTE:  Chief Complaint   Patient presents with   • New Patient     Seizure     Patient is referred by Shelly Watson (ANIKA) for initial consult.    History of present illness:  Paras Lamb 33 y.o. male presents today for seizures.   History is obtained from patient.  and Patient is accompanied by self    Duration/timing: onset age 13, 2/11/19 most recent episode, prior to that last episode 2010 (when he was drinking heavily).  Context: Seizures; when he was in 13 years old he fell asleep in class and had a seizure (described as GTC). +DM1 at age 26. Most recent seizure - He is in [employer] program - and he medication management was confused and he took too much insulin with blood glucose 29 - he was found in kitchen passed out and unresponsive and witnessed seizure with tongue biting followed by another seizure (likely GTC). Hx of heavy EtOH use daily with witnessed seizures within 24 hours. Mom had seizures since childhood. Does not drive - does not have license. Never been on seizure meds.   Location: brain  Quality: shaking   Severity: moderate to severe  Modifying factors: worse with EtOH and hypoglycemia  Associated signs/symptoms: post episode confusion  Denies: bladder incontinence, bowel incontinence, headaches, vision changes, head trauma , weakness, other numbness/tingling, swallowing difficulties, speech disturbance, hearing loss, hallucinations and aura, hx of withdrawals with alcohol     Past medical history:   Past Medical History:   Diagnosis Date   • Anxiety    • Depression    • Diabetes    • Hypertension    • Seizure (HCC)        Past surgical history:   Past Surgical History:   Procedure Laterality Date   • THORACOTOMY Right        Family history:   Family History     Problem Relation Age of Onset   • Seizures Mother    • No Known Problems Father    • Diabetes Maternal Grandfather        Social history:   Social History   • Marital status: Single     Social History Main Topics   • Smoking status: Current Every Day Smoker     Packs/day: 0.25     Years: 17.00     Types: Cigarettes   • Smokeless tobacco: Never Used   • Alcohol use No      Comment: Stopped drinking ~ 2010   • Drug use: No      Comment: heroin, meth   Patient states he used to use MJ and denied heroin and meth to me.     Current medications:   Current Outpatient Prescriptions   Medication   • ibuprofen (MOTRIN) 600 MG Tab   • lisinopril (PRINIVIL) 10 MG Tab   • Melatonin 10 MG SL Tab   • hydrOXYzine HCl (ATARAX) 25 MG Tab   • gabapentin (NEURONTIN) 100 MG Cap   • DULoxetine (CYMBALTA) 60 MG Cap DR Particles delayed-release capsule   • Insulin Glargine (BASAGLAR KWIKPEN) 100 UNIT/ML Solution Pen-injector   • Blood Glucose Meter Kit   • Blood Glucose Test Strips   • Lancets   • Alcohol Swabs   • Insulin Pen Needle 32 G x 4 mm   • insulin lispro, Human, (HUMALOG) 100 UNIT/ML Solution Pen-injector injection     No current facility-administered medications for this visit.        Medication Allergy:  No Known Allergies    Review of Systems   Constitutional: Negative for fever and weight loss.   HENT: Negative for sore throat.    Eyes: Negative for discharge.   Respiratory: Negative for shortness of breath.    Cardiovascular: Negative for leg swelling.        Chest mild discomfort with anxiety   Gastrointestinal: Negative for abdominal pain.   Genitourinary: Negative for dysuria.   Musculoskeletal: Negative for falls.   Skin: Negative for rash.   Neurological:        As per HPI   Endo/Heme/Allergies: Does not bruise/bleed easily.   Psychiatric/Behavioral: Negative for hallucinations.       Physical examination:   Vitals:    02/28/19 0922   BP: 114/70   BP Location: Left arm   Patient Position: Sitting   BP Cuff Size: Adult  "  Pulse: (!) 110   Resp: 16   Temp: 36.6 °C (97.8 °F)   TempSrc: Temporal   SpO2: 93%   Weight: 85.7 kg (189 lb)   Height: 1.778 m (5' 10\")     General: Patient in well nourished in no apparent distress.  Eyes: Ophthalmoscopic examination of the optic discs and posterior elements reveals sharp disk margins, normal vessels and pigmentation bilaterally.  HENT: Normocephalic, atraumatic. Mallapatic score 3  Cardiovascular: No lower extremity edema.  Respiratory: Normal respiratory effort.   Skin: No appreciable signs of acute rashes or bruising.   Musculoskeletal: No signs of joint or muscle swelling.   Psychiatric: Pleasant.     NEUROLOGICAL EXAM:   Mental status: Awake, alert and fully oriented to person, place, time and situation. Normal attention, concentration and fund of knowledge for education level.   Speech and language: Speech is fluent without errors.  Cranial nerve exam:  II: Pupils are equally round and reactive to light. Visual fields are intact by confrontation.  III, IV, VI: EOMI, no diplopia, no ptosis.  V: Sensation to light touch is normal over V1-3 distributions bilaterally.  .  VII: Facial movements are symmetrical. There is no facial droop. .  VIII: Hearing intact to soft speech and finger rub bilaterally  IX: Palate elevates symmetrically, uvula is midline. Dysarthria is not present.  XI: Shoulder shrug are symmetrical and strong.   XII: Tongue protrudes midline.    Motor exam:  Muscle tone is normal in all 4 limbs. and No abnormal movements appreciated.    Muscle strength:     Right  Left  Deltoid   5/5  5/5      Biceps   5/5  5/5  Triceps  5/5  5/5   Wrist extensors 5/5  5/5  Wrist flexors  5/5  5/5     5/5  5/5  Interossei  5/5  5/5  Thenar (APB)  NT/5  NT/5   Hip flexors  5/5  5/5  Quadriceps  5/5  5/5    Hamstrings  5/5  5/5  Dorsiflexors  5/5  5/5  Plantarflexors  5/5  5/5  Toe extension  NT/5  NT/5  NT = not tested    Sensory exam:  Intact to Light touch, Temperature and Vibration in " "bilateral upper and lower extremity.    Deep tendon reflexes:       Right  Left  Biceps   1/4  1/4  Triceps  1/4  1/4  Brachioradialis 1/4  1/4  Knee jerk  0/4  0/4  Ankle jerk  0/4  0/4   bilateral toes are downgoing to plantar stimulation..    Coordination: shows a normal finger-nose-finger and heel to shin bilaterally.   Gait: Casual gait is normal., Heel walk is normal., Toe walk is normal. and Arm swing is robust and symmetric.      ANCILLARY DATA REVIEWED:   Lab Data Review:  Lab Results   Component Value Date/Time    WBC 8.8 11/24/2018 03:08 AM    RBC 4.19 (L) 11/24/2018 03:08 AM    HEMOGLOBIN 12.6 (L) 11/24/2018 03:08 AM    HEMATOCRIT 36.6 (L) 11/24/2018 03:08 AM    MCV 87.4 11/24/2018 03:08 AM    MCH 30.1 11/24/2018 03:08 AM    MCHC 34.4 11/24/2018 03:08 AM    MPV 9.9 11/24/2018 03:08 AM    NEUTSPOLYS 67.20 11/24/2018 03:08 AM    LYMPHOCYTES 24.30 11/24/2018 03:08 AM    MONOCYTES 5.70 11/24/2018 03:08 AM    EOSINOPHILS 1.80 11/24/2018 03:08 AM    BASOPHILS 0.50 11/24/2018 03:08 AM      Lab Results   Component Value Date/Time    SODIUM 132 (L) 11/26/2018 02:48 AM    POTASSIUM 4.0 11/26/2018 02:48 AM    CHLORIDE 100 11/26/2018 02:48 AM    CO2 26 11/26/2018 02:48 AM    GLUCOSE 294 (H) 11/26/2018 02:48 AM    BUN 27 (H) 11/26/2018 02:48 AM    CREATININE 0.73 11/26/2018 02:48 AM       Lab Results  Component Value Date/Time   ASTSGOT 7 (L) 11/24/2018 0308   ALTSGPT 13 11/24/2018 0308   ALKPHOSPHAT 72 11/24/2018 0308   ALBUMIN 3.0 (L) 11/24/2018 0308     Lab Results   Component Value Date/Time    HBA1C 15.9 (H) 11/24/2018 03:08 AM      EEG performed April 2016 performed for dizziness and lightheadedness before loss of consciousness after taking long acting insulin and forgetting to eat breakfast: Limited EEG due to persistent drowsiness and limited awake times.  EEG interpretation is \"not remarkable, no epileptiform.\"    Imaging: CT head without contrast March 2016 performed for headache: No acute intracranial " abnormality is identified. 3.1 x 2.9 cm left middle cranial fossa arachnoid cyst. Prominent adenoids.  Records reviewed: Patient seen at [facility] in November 2018 for diabetic ketoacidosis.  He has had multiple admissions for DKA in the setting of medication noncompliance even requiring intubation.  Patient has a history of homelessness depression and anxiety.    ASSESSMENT AND PLAN:      1. Seizure (HCC): History of suspected unprovoked seizure at age 13.  Since then he has had multiple seizures that have been provoked either with alcohol or hypoglycemia.  Abstinent from alcohol for 10 years.  Currently his blood glucose is being monitored managed by endocrinology and have not had recent mistakes with his insulin.  EEG in 2016 was not of great quality (pt obtunded) but did not show epileptiform activity.  CT head with medial cranial fossa arachnoid cyst but no MRI previously performed.  Work patient up for seizure risk factors.  He does not drive.  There is a possibility that he may have grown out of his seizure, though one can argue to to start AEDs to prevent seizures.  -Discussed with patient risks/benefits/alternatives/side effects of medications including but not limited to Keppra.  We decided to evaluate workup with MRI brain and EEG.  If there are abnormalities that puts him at high risk for seizures will discuss starting medication.  However if normal, will monitor for non-provoked seizures and need for medication.  - Comp Metabolic Panel; Future  - MR-BRAIN-WITH & W/O; Future  - REFERRAL TO NEURODIAGNOSTICS (EEG,EP,EMG/NCS/DBS) Modality Requested: EEG  -DMV form faxed    2. [facility]ness: [facility]    3. Type 1 diabetes mellitus with diabetic polyneuropathy (HCC): Establish with primary care and endocrinology.  Patient aware of the risks of extreme hypoglycemia and neurological detriment.  He has a mild peripheral neuropathy likely more small then large fiber at this point.  Currently  experiencing brief pains in his soles of his feet on gabapentin.  -Continue gabapentin for neuropathic pain    4. Depression, unspecified depression type: Without HI or SI    FOLLOW-UP: Return in about 3 months (around 5/28/2019).  EDUCATION AND COUNSELING:  -I personally discussed the following with the patient:   Risks/benefits/side effects/alternatives of medication including but not limited to drowsiness, sedation, dizziness and agitation., Recommend improvement of sleep hygiene, including a structured schedule. Allow 7-8 hrs of overnight sleep. Avoid daytime naps. , Patient/family educated on SUDEP (Sudden Death in Epilepsy). Counseling was provided on the importance of strict medication and follow up compliance. The patient understands the risks associated with non-adherence with the medical plan as outlined, including but not limited to an increased risk for breakthrough seizures, which may contribute to injuries, disability, status epilepticus, and even death.  , Counseling was also provided on potential effects of alcohol and other drugs, which may lower seizure threshold and/or affect the metabolism of antiepileptic drugs. I recommend avoidance of alcohol and illegal drugs., I have made the patient aware of mandatory reporting required by the law in the State of [place] regarding episodes of seizures, loss of consciousness, alteration of awareness, and/or concerns for driving safety as discussed today. The patient and family are responsible for reporting events to the DMV, instructions were provided. The patient verbalized understanding.  and Other seizure precautions were discussed at length, including but not limited to no diving, no skydiving, no unsupervised swimming, no Jacuzzi or bathing in bathtubs.     The patient understands and agrees that due to the complexity of his/her diagnosis, results of any testing and further recommendations will typically be discussed/made during a face to face encounter  in my office. The patient and/or family further understands it is their responsibility to keep proper follow up.     Disclaimer  This dictation was created using voice recognition software. I have made every reasonable attempt to avoid dictation errors, but this document may contain an error not identified before finalizing. If the error changes the accuracy of the document, I would appreciate it being brought to my attention. Thank you very much.     Roxy Vail MD  Neurology, Neurophysiology  [facility]        No Recipients

## 2019-02-28 NOTE — PROGRESS NOTES
Chief Complaint   Patient presents with   • New Patient     Seizure     Patient is referred by Shelly Watson (ANIKA) for initial consult.    History of present illness:  Paras Lamb 33 y.o. male presents today for seizures.   History is obtained from patient.  and Patient is accompanied by self    Duration/timing: onset age 13, 2/11/19 most recent episode, prior to that last episode 2010 (when he was drinking heavily).  Context: Seizures; when he was in 13 years old he fell asleep in class and had a seizure (described as GTC). +DM1 at age 26. Most recent seizure - He is in [employer] program - and he medication management was confused and he took too much insulin with blood glucose 29 - he was found in kitchen passed out and unresponsive and witnessed seizure with tongue biting followed by another seizure (likely GTC). Hx of heavy EtOH use daily with witnessed seizures within 24 hours. Mom had seizures since childhood. Does not drive - does not have license. Never been on seizure meds.   Location: brain  Quality: shaking   Severity: moderate to severe  Modifying factors: worse with EtOH and hypoglycemia  Associated signs/symptoms: post episode confusion  Denies: bladder incontinence, bowel incontinence, headaches, vision changes, head trauma , weakness, other numbness/tingling, swallowing difficulties, speech disturbance, hearing loss, hallucinations and aura, hx of withdrawals with alcohol     Past medical history:   Past Medical History:   Diagnosis Date   • Anxiety    • Depression    • Diabetes    • Hypertension    • Seizure (HCC)        Past surgical history:   Past Surgical History:   Procedure Laterality Date   • THORACOTOMY Right        Family history:   Family History   Problem Relation Age of Onset   • Seizures Mother    • No Known Problems Father    • Diabetes Maternal Grandfather        Social history:   Social History   • Marital status: Single     Social History Main Topics   • Smoking status: Current  "Every Day Smoker     Packs/day: 0.25     Years: 17.00     Types: Cigarettes   • Smokeless tobacco: Never Used   • Alcohol use No      Comment: Stopped drinking ~ 2010   • Drug use: No      Comment: heroin, meth   Patient states he used to use MJ and denied heroin and meth to me.     Current medications:   Current Outpatient Prescriptions   Medication   • ibuprofen (MOTRIN) 600 MG Tab   • lisinopril (PRINIVIL) 10 MG Tab   • Melatonin 10 MG SL Tab   • hydrOXYzine HCl (ATARAX) 25 MG Tab   • gabapentin (NEURONTIN) 100 MG Cap   • DULoxetine (CYMBALTA) 60 MG Cap DR Particles delayed-release capsule   • Insulin Glargine (BASAGLAR KWIKPEN) 100 UNIT/ML Solution Pen-injector   • Blood Glucose Meter Kit   • Blood Glucose Test Strips   • Lancets   • Alcohol Swabs   • Insulin Pen Needle 32 G x 4 mm   • insulin lispro, Human, (HUMALOG) 100 UNIT/ML Solution Pen-injector injection     No current facility-administered medications for this visit.        Medication Allergy:  No Known Allergies    Review of Systems   Constitutional: Negative for fever and weight loss.   HENT: Negative for sore throat.    Eyes: Negative for discharge.   Respiratory: Negative for shortness of breath.    Cardiovascular: Negative for leg swelling.        Chest mild discomfort with anxiety   Gastrointestinal: Negative for abdominal pain.   Genitourinary: Negative for dysuria.   Musculoskeletal: Negative for falls.   Skin: Negative for rash.   Neurological:        As per HPI   Endo/Heme/Allergies: Does not bruise/bleed easily.   Psychiatric/Behavioral: Negative for hallucinations.       Physical examination:   Vitals:    02/28/19 0922   BP: 114/70   BP Location: Left arm   Patient Position: Sitting   BP Cuff Size: Adult   Pulse: (!) 110   Resp: 16   Temp: 36.6 °C (97.8 °F)   TempSrc: Temporal   SpO2: 93%   Weight: 85.7 kg (189 lb)   Height: 1.778 m (5' 10\")     General: Patient in well nourished in no apparent distress.  Eyes: Ophthalmoscopic examination of " the optic discs and posterior elements reveals sharp disk margins, normal vessels and pigmentation bilaterally.  HENT: Normocephalic, atraumatic. Mallapatic score 3, right ear pimple pinna - tender to touch but no surrounding inflammation or swelling  Cardiovascular: No lower extremity edema.  Respiratory: Normal respiratory effort.   Skin: No appreciable signs of acute rashes or bruising.   Musculoskeletal: No signs of joint or muscle swelling.   Psychiatric: Pleasant.     NEUROLOGICAL EXAM:   Mental status: Awake, alert and fully oriented to person, place, time and situation. Normal attention, concentration and fund of knowledge for education level.   Speech and language: Speech is fluent without errors.  Cranial nerve exam:  II: Pupils are equally round and reactive to light. Visual fields are intact by confrontation.  III, IV, VI: EOMI, no diplopia, no ptosis.  V: Sensation to light touch is normal over V1-3 distributions bilaterally.  .  VII: Facial movements are symmetrical. There is no facial droop. .  VIII: Hearing intact to soft speech and finger rub bilaterally  IX: Palate elevates symmetrically, uvula is midline. Dysarthria is not present.  XI: Shoulder shrug are symmetrical and strong.   XII: Tongue protrudes midline.    Motor exam:  Muscle tone is normal in all 4 limbs. and No abnormal movements appreciated.    Muscle strength:     Right  Left  Deltoid   5/5  5/5      Biceps   5/5  5/5  Triceps  5/5  5/5   Wrist extensors 5/5  5/5  Wrist flexors  5/5  5/5     5/5  5/5  Interossei  5/5  5/5  Thenar (APB)  NT/5  NT/5   Hip flexors  5/5  5/5  Quadriceps  5/5  5/5    Hamstrings  5/5  5/5  Dorsiflexors  5/5  5/5  Plantarflexors  5/5  5/5  Toe extension  NT/5  NT/5  NT = not tested    Sensory exam:  Intact to Light touch, Temperature and Vibration in bilateral upper and lower extremity.    Deep tendon reflexes:       Right  Left  Biceps   1/4  1/4  Triceps  1/4  1/4  Brachioradialis 1/4  1/4  Knee  "jerk  0/4  0/4  Ankle jerk  0/4  0/4   bilateral toes are downgoing to plantar stimulation..    Coordination: shows a normal finger-nose-finger and heel to shin bilaterally.   Gait: Casual gait is normal., Heel walk is normal., Toe walk is normal. and Arm swing is robust and symmetric.      ANCILLARY DATA REVIEWED:   Lab Data Review:  Lab Results   Component Value Date/Time    WBC 8.8 11/24/2018 03:08 AM    RBC 4.19 (L) 11/24/2018 03:08 AM    HEMOGLOBIN 12.6 (L) 11/24/2018 03:08 AM    HEMATOCRIT 36.6 (L) 11/24/2018 03:08 AM    MCV 87.4 11/24/2018 03:08 AM    MCH 30.1 11/24/2018 03:08 AM    MCHC 34.4 11/24/2018 03:08 AM    MPV 9.9 11/24/2018 03:08 AM    NEUTSPOLYS 67.20 11/24/2018 03:08 AM    LYMPHOCYTES 24.30 11/24/2018 03:08 AM    MONOCYTES 5.70 11/24/2018 03:08 AM    EOSINOPHILS 1.80 11/24/2018 03:08 AM    BASOPHILS 0.50 11/24/2018 03:08 AM      Lab Results   Component Value Date/Time    SODIUM 132 (L) 11/26/2018 02:48 AM    POTASSIUM 4.0 11/26/2018 02:48 AM    CHLORIDE 100 11/26/2018 02:48 AM    CO2 26 11/26/2018 02:48 AM    GLUCOSE 294 (H) 11/26/2018 02:48 AM    BUN 27 (H) 11/26/2018 02:48 AM    CREATININE 0.73 11/26/2018 02:48 AM       Lab Results  Component Value Date/Time   ASTSGOT 7 (L) 11/24/2018 0308   ALTSGPT 13 11/24/2018 0308   ALKPHOSPHAT 72 11/24/2018 0308   ALBUMIN 3.0 (L) 11/24/2018 0308     Lab Results   Component Value Date/Time    HBA1C 15.9 (H) 11/24/2018 03:08 AM      EEG performed April 2016 performed for dizziness and lightheadedness before loss of consciousness after taking long acting insulin and forgetting to eat breakfast: Limited EEG due to persistent drowsiness and limited awake times.  EEG interpretation is \"not remarkable, no epileptiform.\"    Imaging: CT head without contrast March 2016 performed for headache: No acute intracranial abnormality is identified. 3.1 x 2.9 cm left middle cranial fossa arachnoid cyst. Prominent adenoids.  Records reviewed: Patient seen at [facility] in " November 2018 for diabetic ketoacidosis.  He has had multiple admissions for DKA in the setting of medication noncompliance even requiring intubation.  Patient has a history of homelessness depression and anxiety.    ASSESSMENT AND PLAN:      1. Seizure (HCC): History of suspected unprovoked seizure at age 13.  Since then he has had multiple seizures that have been provoked either with alcohol or hypoglycemia.  Abstinent from alcohol for 10 years.  Currently his blood glucose is being monitored managed by endocrinology and have not had recent mistakes with his insulin.  EEG in 2016 was not of great quality (pt obtunded) but did not show epileptiform activity.  CT head with medial cranial fossa arachnoid cyst but no MRI previously performed.  Work patient up for seizure risk factors.  He does not drive.  There is a possibility that he may have grown out of his seizure, though one can argue to to start AEDs to prevent seizures.  -Discussed with patient risks/benefits/alternatives/side effects of medications including but not limited to Keppra.  We decided to evaluate workup with MRI brain and EEG.  If there are abnormalities that puts him at high risk for seizures will discuss starting medication.  However if normal, will monitor for non-provoked seizures and need for medication.  - Comp Metabolic Panel; Future  - MR-BRAIN-WITH & W/O; Future  - REFERRAL TO NEURODIAGNOSTICS (EEG,EP,EMG/NCS/DBS) Modality Requested: EEG  -DMV form faxed    2. Homelessness: Established with [facility]    3. Type 1 diabetes mellitus with diabetic polyneuropathy (HCC): Establish with primary care and endocrinology.  Patient aware of the risks of extreme hypoglycemia and neurological detriment.  He has a mild peripheral neuropathy likely more small then large fiber at this point.  Currently experiencing brief pains in his soles of his feet on gabapentin.  -Continue gabapentin for neuropathic pain    4. Depression, unspecified depression  type: Without HI or SI    FOLLOW-UP: Return in about 3 months (around 5/28/2019).  EDUCATION AND COUNSELING:  -I personally discussed the following with the patient:   Risks/benefits/side effects/alternatives of medication including but not limited to drowsiness, sedation, dizziness and agitation., Recommend improvement of sleep hygiene, including a structured schedule. Allow 7-8 hrs of overnight sleep. Avoid daytime naps. , Patient/family educated on SUDEP (Sudden Death in Epilepsy). Counseling was provided on the importance of strict medication and follow up compliance. The patient understands the risks associated with non-adherence with the medical plan as outlined, including but not limited to an increased risk for breakthrough seizures, which may contribute to injuries, disability, status epilepticus, and even death.  , Counseling was also provided on potential effects of alcohol and other drugs, which may lower seizure threshold and/or affect the metabolism of antiepileptic drugs. I recommend avoidance of alcohol and illegal drugs., I have made the patient aware of mandatory reporting required by the law in the State of [place] regarding episodes of seizures, loss of consciousness, alteration of awareness, and/or concerns for driving safety as discussed today. The patient and family are responsible for reporting events to the DMV, instructions were provided. The patient verbalized understanding.  and Other seizure precautions were discussed at length, including but not limited to no diving, no skydiving, no unsupervised swimming, no Jacuzzi or bathing in bathtubs.     The patient understands and agrees that due to the complexity of his/her diagnosis, results of any testing and further recommendations will typically be discussed/made during a face to face encounter in my office. The patient and/or family further understands it is their responsibility to keep proper follow up.     Disclaimer  This dictation was  created using voice recognition software. I have made every reasonable attempt to avoid dictation errors, but this document may contain an error not identified before finalizing. If the error changes the accuracy of the document, I would appreciate it being brought to my attention. Thank you very much.     Roxy Vail MD  Neurology, Neurophysiology  [facility]

## 2019-03-04 ENCOUNTER — DOCUMENTATION (OUTPATIENT)
Dept: NEUROLOGY | Facility: MEDICAL CENTER | Age: 34
End: 2019-03-04

## 2019-04-06 ENCOUNTER — HOSPITAL ENCOUNTER (INPATIENT)
Facility: MEDICAL CENTER | Age: 34
LOS: 1 days | DRG: 638 | End: 2019-04-07
Attending: EMERGENCY MEDICINE | Admitting: HOSPITALIST
Payer: MEDICAID

## 2019-04-06 DIAGNOSIS — E16.2 HYPOGLYCEMIA: ICD-10-CM

## 2019-04-06 PROBLEM — G93.40 ENCEPHALOPATHY ACUTE: Status: ACTIVE | Noted: 2019-04-06

## 2019-04-06 LAB
ALBUMIN SERPL BCP-MCNC: 4.1 G/DL (ref 3.2–4.9)
ALBUMIN/GLOB SERPL: 1.6 G/DL
ALP SERPL-CCNC: 85 U/L (ref 30–99)
ALT SERPL-CCNC: 37 U/L (ref 2–50)
ANION GAP SERPL CALC-SCNC: 9 MMOL/L (ref 0–11.9)
AST SERPL-CCNC: 22 U/L (ref 12–45)
BASOPHILS # BLD AUTO: 0.8 % (ref 0–1.8)
BASOPHILS # BLD: 0.05 K/UL (ref 0–0.12)
BILIRUB SERPL-MCNC: 0.4 MG/DL (ref 0.1–1.5)
BUN SERPL-MCNC: 16 MG/DL (ref 8–22)
CALCIUM SERPL-MCNC: 9.5 MG/DL (ref 8.5–10.5)
CHLORIDE SERPL-SCNC: 105 MMOL/L (ref 96–112)
CO2 SERPL-SCNC: 25 MMOL/L (ref 20–33)
CREAT SERPL-MCNC: 0.93 MG/DL (ref 0.5–1.4)
EKG IMPRESSION: NORMAL
EOSINOPHIL # BLD AUTO: 0.28 K/UL (ref 0–0.51)
EOSINOPHIL NFR BLD: 4.7 % (ref 0–6.9)
ERYTHROCYTE [DISTWIDTH] IN BLOOD BY AUTOMATED COUNT: 43.3 FL (ref 35.9–50)
GLOBULIN SER CALC-MCNC: 2.6 G/DL (ref 1.9–3.5)
GLUCOSE BLD-MCNC: 100 MG/DL (ref 65–99)
GLUCOSE BLD-MCNC: 114 MG/DL (ref 65–99)
GLUCOSE BLD-MCNC: 130 MG/DL (ref 65–99)
GLUCOSE BLD-MCNC: 146 MG/DL (ref 65–99)
GLUCOSE BLD-MCNC: 155 MG/DL (ref 65–99)
GLUCOSE BLD-MCNC: 163 MG/DL (ref 65–99)
GLUCOSE BLD-MCNC: 184 MG/DL (ref 65–99)
GLUCOSE BLD-MCNC: 185 MG/DL (ref 65–99)
GLUCOSE BLD-MCNC: 191 MG/DL (ref 65–99)
GLUCOSE BLD-MCNC: 264 MG/DL (ref 65–99)
GLUCOSE BLD-MCNC: 36 MG/DL (ref 65–99)
GLUCOSE BLD-MCNC: 43 MG/DL (ref 65–99)
GLUCOSE BLD-MCNC: 50 MG/DL (ref 65–99)
GLUCOSE BLD-MCNC: 73 MG/DL (ref 65–99)
GLUCOSE BLD-MCNC: 86 MG/DL (ref 65–99)
GLUCOSE BLD-MCNC: 90 MG/DL (ref 65–99)
GLUCOSE BLD-MCNC: 92 MG/DL (ref 65–99)
GLUCOSE BLD-MCNC: 94 MG/DL (ref 65–99)
GLUCOSE BLD-MCNC: 95 MG/DL (ref 65–99)
GLUCOSE SERPL-MCNC: 104 MG/DL (ref 65–99)
HCT VFR BLD AUTO: 50 % (ref 42–52)
HGB BLD-MCNC: 16 G/DL (ref 14–18)
IMM GRANULOCYTES # BLD AUTO: 0.01 K/UL (ref 0–0.11)
IMM GRANULOCYTES NFR BLD AUTO: 0.2 % (ref 0–0.9)
LYMPHOCYTES # BLD AUTO: 1.94 K/UL (ref 1–4.8)
LYMPHOCYTES NFR BLD: 32.7 % (ref 22–41)
MCH RBC QN AUTO: 29 PG (ref 27–33)
MCHC RBC AUTO-ENTMCNC: 32 G/DL (ref 33.7–35.3)
MCV RBC AUTO: 90.7 FL (ref 81.4–97.8)
MONOCYTES # BLD AUTO: 0.67 K/UL (ref 0–0.85)
MONOCYTES NFR BLD AUTO: 11.3 % (ref 0–13.4)
NEUTROPHILS # BLD AUTO: 2.99 K/UL (ref 1.82–7.42)
NEUTROPHILS NFR BLD: 50.3 % (ref 44–72)
NRBC # BLD AUTO: 0 K/UL
NRBC BLD-RTO: 0 /100 WBC
PLATELET # BLD AUTO: 242 K/UL (ref 164–446)
PMV BLD AUTO: 10.1 FL (ref 9–12.9)
POTASSIUM SERPL-SCNC: 3.9 MMOL/L (ref 3.6–5.5)
PROT SERPL-MCNC: 6.7 G/DL (ref 6–8.2)
RBC # BLD AUTO: 5.51 M/UL (ref 4.7–6.1)
SODIUM SERPL-SCNC: 139 MMOL/L (ref 135–145)
WBC # BLD AUTO: 5.9 K/UL (ref 4.8–10.8)

## 2019-04-06 PROCEDURE — 700102 HCHG RX REV CODE 250 W/ 637 OVERRIDE(OP): Performed by: INTERNAL MEDICINE

## 2019-04-06 PROCEDURE — 99291 CRITICAL CARE FIRST HOUR: CPT

## 2019-04-06 PROCEDURE — 96376 TX/PRO/DX INJ SAME DRUG ADON: CPT

## 2019-04-06 PROCEDURE — 96367 TX/PROPH/DG ADDL SEQ IV INF: CPT

## 2019-04-06 PROCEDURE — 85025 COMPLETE CBC W/AUTO DIFF WBC: CPT

## 2019-04-06 PROCEDURE — A9270 NON-COVERED ITEM OR SERVICE: HCPCS | Performed by: INTERNAL MEDICINE

## 2019-04-06 PROCEDURE — 80053 COMPREHEN METABOLIC PANEL: CPT

## 2019-04-06 PROCEDURE — 93010 ELECTROCARDIOGRAM REPORT: CPT | Performed by: INTERNAL MEDICINE

## 2019-04-06 PROCEDURE — 99223 1ST HOSP IP/OBS HIGH 75: CPT | Performed by: HOSPITALIST

## 2019-04-06 PROCEDURE — 36415 COLL VENOUS BLD VENIPUNCTURE: CPT

## 2019-04-06 PROCEDURE — 99291 CRITICAL CARE FIRST HOUR: CPT | Performed by: INTERNAL MEDICINE

## 2019-04-06 PROCEDURE — 96365 THER/PROPH/DIAG IV INF INIT: CPT

## 2019-04-06 PROCEDURE — 82962 GLUCOSE BLOOD TEST: CPT | Mod: 91

## 2019-04-06 PROCEDURE — 94760 N-INVAS EAR/PLS OXIMETRY 1: CPT

## 2019-04-06 PROCEDURE — 93005 ELECTROCARDIOGRAM TRACING: CPT | Performed by: INTERNAL MEDICINE

## 2019-04-06 PROCEDURE — 700105 HCHG RX REV CODE 258: Performed by: INTERNAL MEDICINE

## 2019-04-06 PROCEDURE — A9270 NON-COVERED ITEM OR SERVICE: HCPCS | Performed by: HOSPITALIST

## 2019-04-06 PROCEDURE — 96366 THER/PROPH/DIAG IV INF ADDON: CPT

## 2019-04-06 PROCEDURE — 700105 HCHG RX REV CODE 258

## 2019-04-06 PROCEDURE — 700101 HCHG RX REV CODE 250: Performed by: INTERNAL MEDICINE

## 2019-04-06 PROCEDURE — 700101 HCHG RX REV CODE 250

## 2019-04-06 PROCEDURE — 700102 HCHG RX REV CODE 250 W/ 637 OVERRIDE(OP): Performed by: HOSPITALIST

## 2019-04-06 PROCEDURE — 700101 HCHG RX REV CODE 250: Performed by: EMERGENCY MEDICINE

## 2019-04-06 PROCEDURE — 770022 HCHG ROOM/CARE - ICU (200)

## 2019-04-06 PROCEDURE — 96375 TX/PRO/DX INJ NEW DRUG ADDON: CPT

## 2019-04-06 RX ORDER — NICOTINE 21 MG/24HR
21 PATCH, TRANSDERMAL 24 HOURS TRANSDERMAL
Status: DISCONTINUED | OUTPATIENT
Start: 2019-04-06 | End: 2019-04-07 | Stop reason: HOSPADM

## 2019-04-06 RX ORDER — ACETAMINOPHEN 325 MG/1
650 TABLET ORAL EVERY 6 HOURS PRN
Status: DISCONTINUED | OUTPATIENT
Start: 2019-04-06 | End: 2019-04-07 | Stop reason: HOSPADM

## 2019-04-06 RX ORDER — ONDANSETRON 2 MG/ML
4 INJECTION INTRAMUSCULAR; INTRAVENOUS EVERY 4 HOURS PRN
Status: DISCONTINUED | OUTPATIENT
Start: 2019-04-06 | End: 2019-04-07 | Stop reason: HOSPADM

## 2019-04-06 RX ORDER — PROMETHAZINE HYDROCHLORIDE 25 MG/1
12.5-25 SUPPOSITORY RECTAL EVERY 4 HOURS PRN
Status: DISCONTINUED | OUTPATIENT
Start: 2019-04-06 | End: 2019-04-07 | Stop reason: HOSPADM

## 2019-04-06 RX ORDER — DEXTROSE MONOHYDRATE 25 G/50ML
25 INJECTION, SOLUTION INTRAVENOUS
Status: DISCONTINUED | OUTPATIENT
Start: 2019-04-06 | End: 2019-04-07 | Stop reason: HOSPADM

## 2019-04-06 RX ORDER — ONDANSETRON 4 MG/1
4 TABLET, ORALLY DISINTEGRATING ORAL EVERY 4 HOURS PRN
Status: DISCONTINUED | OUTPATIENT
Start: 2019-04-06 | End: 2019-04-07 | Stop reason: HOSPADM

## 2019-04-06 RX ORDER — SODIUM CHLORIDE, SODIUM LACTATE, POTASSIUM CHLORIDE, CALCIUM CHLORIDE 600; 310; 30; 20 MG/100ML; MG/100ML; MG/100ML; MG/100ML
2000 INJECTION, SOLUTION INTRAVENOUS ONCE
Status: COMPLETED | OUTPATIENT
Start: 2019-04-06 | End: 2019-04-06

## 2019-04-06 RX ORDER — AMOXICILLIN 250 MG
2 CAPSULE ORAL 2 TIMES DAILY
Status: DISCONTINUED | OUTPATIENT
Start: 2019-04-06 | End: 2019-04-07 | Stop reason: HOSPADM

## 2019-04-06 RX ORDER — DEXTROSE MONOHYDRATE 100 MG/ML
INJECTION, SOLUTION INTRAVENOUS CONTINUOUS
Status: DISCONTINUED | OUTPATIENT
Start: 2019-04-06 | End: 2019-04-06

## 2019-04-06 RX ORDER — POLYETHYLENE GLYCOL 3350 17 G/17G
1 POWDER, FOR SOLUTION ORAL
Status: DISCONTINUED | OUTPATIENT
Start: 2019-04-06 | End: 2019-04-07 | Stop reason: HOSPADM

## 2019-04-06 RX ORDER — OXYCODONE HYDROCHLORIDE 5 MG/1
5 TABLET ORAL EVERY 4 HOURS PRN
Status: DISCONTINUED | OUTPATIENT
Start: 2019-04-06 | End: 2019-04-07

## 2019-04-06 RX ORDER — BISACODYL 10 MG
10 SUPPOSITORY, RECTAL RECTAL
Status: DISCONTINUED | OUTPATIENT
Start: 2019-04-06 | End: 2019-04-07 | Stop reason: HOSPADM

## 2019-04-06 RX ORDER — DEXTROSE MONOHYDRATE 25 G/50ML
INJECTION, SOLUTION INTRAVENOUS
Status: COMPLETED
Start: 2019-04-06 | End: 2019-04-06

## 2019-04-06 RX ORDER — CLONIDINE HYDROCHLORIDE 0.1 MG/1
0.1 TABLET ORAL EVERY 6 HOURS PRN
Status: DISCONTINUED | OUTPATIENT
Start: 2019-04-06 | End: 2019-04-07 | Stop reason: HOSPADM

## 2019-04-06 RX ORDER — DEXTROSE MONOHYDRATE 25 G/50ML
50 INJECTION, SOLUTION INTRAVENOUS ONCE
Status: COMPLETED | OUTPATIENT
Start: 2019-04-06 | End: 2019-04-06

## 2019-04-06 RX ORDER — GABAPENTIN 100 MG/1
700 CAPSULE ORAL 2 TIMES DAILY
COMMUNITY
End: 2020-03-20

## 2019-04-06 RX ORDER — DULOXETIN HYDROCHLORIDE 30 MG/1
30 CAPSULE, DELAYED RELEASE ORAL DAILY
COMMUNITY
End: 2020-03-20

## 2019-04-06 RX ORDER — DULOXETIN HYDROCHLORIDE 30 MG/1
30 CAPSULE, DELAYED RELEASE ORAL DAILY
Status: DISCONTINUED | OUTPATIENT
Start: 2019-04-06 | End: 2019-04-07 | Stop reason: HOSPADM

## 2019-04-06 RX ORDER — PROMETHAZINE HYDROCHLORIDE 25 MG/1
12.5-25 TABLET ORAL EVERY 4 HOURS PRN
Status: DISCONTINUED | OUTPATIENT
Start: 2019-04-06 | End: 2019-04-07 | Stop reason: HOSPADM

## 2019-04-06 RX ADMIN — DEXTROSE MONOHYDRATE 50 ML: 500 INJECTION PARENTERAL at 06:01

## 2019-04-06 RX ADMIN — OXYCODONE HYDROCHLORIDE 5 MG: 5 TABLET ORAL at 17:30

## 2019-04-06 RX ADMIN — DEXTROSE MONOHYDRATE: 10 INJECTION, SOLUTION, CONCENTRATE INTRAVENOUS at 05:57

## 2019-04-06 RX ADMIN — GABAPENTIN 700 MG: 400 CAPSULE ORAL at 17:11

## 2019-04-06 RX ADMIN — NICOTINE 21 MG: 21 PATCH, EXTENDED RELEASE TRANSDERMAL at 13:13

## 2019-04-06 RX ADMIN — OXYCODONE HYDROCHLORIDE 5 MG: 5 TABLET ORAL at 21:27

## 2019-04-06 RX ADMIN — ACETAMINOPHEN 650 MG: 325 TABLET, FILM COATED ORAL at 09:07

## 2019-04-06 RX ADMIN — SODIUM CHLORIDE: 234 INJECTION INTRAMUSCULAR; INTRAVENOUS; SUBCUTANEOUS at 08:30

## 2019-04-06 RX ADMIN — SODIUM CHLORIDE, POTASSIUM CHLORIDE, SODIUM LACTATE AND CALCIUM CHLORIDE 2000 ML: 600; 310; 30; 20 INJECTION, SOLUTION INTRAVENOUS at 09:31

## 2019-04-06 RX ADMIN — DEXTROSE MONOHYDRATE 50 ML: 25 INJECTION, SOLUTION INTRAVENOUS at 06:01

## 2019-04-06 RX ADMIN — ACETAMINOPHEN 650 MG: 325 TABLET, FILM COATED ORAL at 16:37

## 2019-04-06 RX ADMIN — DULOXETINE HYDROCHLORIDE 30 MG: 30 CAPSULE, DELAYED RELEASE ORAL at 13:12

## 2019-04-06 RX ADMIN — SODIUM CHLORIDE: 234 INJECTION INTRAMUSCULAR; INTRAVENOUS; SUBCUTANEOUS at 13:13

## 2019-04-06 RX ADMIN — DEXTROSE MONOHYDRATE 50 ML: 25 INJECTION, SOLUTION INTRAVENOUS at 06:39

## 2019-04-06 ASSESSMENT — ENCOUNTER SYMPTOMS
HEADACHES: 0
PALPITATIONS: 0
STRIDOR: 0
MYALGIAS: 0
HEMOPTYSIS: 0
BLURRED VISION: 0
CHILLS: 0
RESPIRATORY NEGATIVE: 1
CARDIOVASCULAR NEGATIVE: 1
SENSORY CHANGE: 0
VOMITING: 0
DIAPHORESIS: 0
SPUTUM PRODUCTION: 0
WHEEZING: 0
SPEECH CHANGE: 0
PHOTOPHOBIA: 0
FOCAL WEAKNESS: 0
TINGLING: 0
GASTROINTESTINAL NEGATIVE: 1
FEVER: 0
BRUISES/BLEEDS EASILY: 0
DIZZINESS: 0
NECK PAIN: 0
SINUS PAIN: 0
BACK PAIN: 0
NERVOUS/ANXIOUS: 0
DEPRESSION: 0
SHORTNESS OF BREATH: 0
FALLS: 1
COUGH: 0
WEAKNESS: 1
WEIGHT LOSS: 0
DOUBLE VISION: 0
BLOOD IN STOOL: 0
HEARTBURN: 0
MUSCULOSKELETAL NEGATIVE: 1
POLYDIPSIA: 0
PSYCHIATRIC NEGATIVE: 1
EYES NEGATIVE: 1
NEUROLOGICAL NEGATIVE: 1

## 2019-04-06 ASSESSMENT — LIFESTYLE VARIABLES
EVER_SMOKED: YES
DO YOU DRINK ALCOHOL: NO
SUBSTANCE_ABUSE: 0

## 2019-04-06 ASSESSMENT — PATIENT HEALTH QUESTIONNAIRE - PHQ9
2. FEELING DOWN, DEPRESSED, IRRITABLE, OR HOPELESS: NOT AT ALL
1. LITTLE INTEREST OR PLEASURE IN DOING THINGS: NOT AT ALL
SUM OF ALL RESPONSES TO PHQ9 QUESTIONS 1 AND 2: 0

## 2019-04-06 ASSESSMENT — PAIN SCALES - WONG BAKER: WONGBAKER_NUMERICALRESPONSE: DOESN'T HURT AT ALL

## 2019-04-06 NOTE — ED PROVIDER NOTES
ED Provider Note    ED Provider Note    Primary care provider: MAVIS Nair  Means of arrival: EMS  History obtained from: Patient, EMS  History limited by: Patient Condition, somnolence    CHIEF COMPLAINT  Chief Complaint   Patient presents with   • ALOC   • Hypoglycemia       HPI  Paras Lamb is a 33 y.o. male who presents to the Emergency Department with a chief complaint via EMS with hypoglycemia.  Patient presents from [facility].  He apparently, was noted by some of his bunk mates, to be staggering and had a fall.  EMS was called.  He was noted to be hypoglycemic with a sugar in the low 40s.  He was given oral sugar with no improvement.  An IV was subsequently started and he was given D10 with an increase in his blood sugar to 96.  No seizure reported.  Patient does report biting his left tongue.  He complains of pain in both of his arms.  He has little recollection of the events and is unable to give me many details regarding it.  He is quite somnolent.  Denies drug or alcohol use.  Patient does report a history of diabetes as well as seizure disorder.    REVIEW OF SYSTEMS  Review of Systems   Unable to perform ROS: Medical condition   Musculoskeletal: Positive for falls and joint pain.   Psychiatric/Behavioral: Negative for substance abuse.       PAST MEDICAL HISTORY   has a past medical history of Anxiety; Depression; Diabetes; Hypertension; and Seizure (HCC).    SURGICAL HISTORY   has a past surgical history that includes thoracotomy (Right).    SOCIAL HISTORY  Social History   Substance Use Topics   • Smoking status: Current Every Day Smoker     Packs/day: 0.25     Years: 17.00     Types: Cigarettes   • Smokeless tobacco: Never Used   • Alcohol use No      Comment: Stopped drinking ~ 2010      History   Drug Use No     Comment: heroin, meth       FAMILY HISTORY  Family History   Problem Relation Age of Onset   • Seizures Mother    • No Known Problems Father    • Diabetes Maternal Grandfather   "      CURRENT MEDICATIONS  Home Medications     Reviewed by Sabrina Carey, Pharmacy Intern (Pharmacy Intern) on 04/06/19 at 0846  Med List Status: Complete   Medication Last Dose Status   DULoxetine (CYMBALTA) 30 MG Cap DR Particles  Active   gabapentin (NEURONTIN) 100 MG Cap  Active   hydrOXYzine HCl (ATARAX) 25 MG Tab  Active   Insulin Glargine (BASAGLAR KWIKPEN) 100 UNIT/ML Solution Pen-injector  Active                ALLERGIES  No Known Allergies    PHYSICAL EXAM  VITAL SIGNS: Pulse 93   Temp 36.7 °C (98 °F) (Temporal)   Resp (!) 26   Ht 1.778 m (5' 10\")   Wt 86.8 kg (191 lb 5.8 oz)   SpO2 95%   BMI 27.46 kg/m²   Vitals reviewed.  Constitutional: Patient is oriented to person, place, and time. Appears well-developed and well-nourished. No distress.  Somnolent.  Has difficulty answering questions.  Head: Normocephalic and atraumatic.   Ears: Normal external ears bilaterally.   Mouth/Throat: Oropharynx is clear and moist, no exudates.   Eyes: Conjunctivae are normal. Pupils are equal, round, and reactive to light.   Neck: Normal range of motion. Neck supple.  Cardiovascular: Normal rate, regular rhythm and normal heart sounds. Normal peripheral pulses.  Pulmonary/Chest: Effort normal and breath sounds normal. No respiratory distress, no wheezes, rhonchi, or rales. No chest wall tenderness.  Abdominal: Soft. Bowel sounds are normal. There is no tenderness. No rebound or guarding, or peritoneal signs. No CVA tenderness.  Musculoskeletal: No edema and no tenderness.   Lymphadenopathy: No cervical adenopathy.   Neurological: No focal deficits.   Skin: Skin is warm and dry. No erythema. No pallor.   Psychiatric: Patient has a normal mood and affect.     LABS  Results for orders placed or performed during the hospital encounter of 04/06/19   CBC w/ Differential   Result Value Ref Range    WBC 5.9 4.8 - 10.8 K/uL    RBC 5.51 4.70 - 6.10 M/uL    Hemoglobin 16.0 14.0 - 18.0 g/dL    Hematocrit 50.0 42.0 - 52.0 %    " MCV 90.7 81.4 - 97.8 fL    MCH 29.0 27.0 - 33.0 pg    MCHC 32.0 (L) 33.7 - 35.3 g/dL    RDW 43.3 35.9 - 50.0 fL    Platelet Count 242 164 - 446 K/uL    MPV 10.1 9.0 - 12.9 fL    Neutrophils-Polys 50.30 44.00 - 72.00 %    Lymphocytes 32.70 22.00 - 41.00 %    Monocytes 11.30 0.00 - 13.40 %    Eosinophils 4.70 0.00 - 6.90 %    Basophils 0.80 0.00 - 1.80 %    Immature Granulocytes 0.20 0.00 - 0.90 %    Nucleated RBC 0.00 /100 WBC    Neutrophils (Absolute) 2.99 1.82 - 7.42 K/uL    Lymphs (Absolute) 1.94 1.00 - 4.80 K/uL    Monos (Absolute) 0.67 0.00 - 0.85 K/uL    Eos (Absolute) 0.28 0.00 - 0.51 K/uL    Baso (Absolute) 0.05 0.00 - 0.12 K/uL    Immature Granulocytes (abs) 0.01 0.00 - 0.11 K/uL    NRBC (Absolute) 0.00 K/uL   Complete Metabolic Panel (CMP)   Result Value Ref Range    Sodium 139 135 - 145 mmol/L    Potassium 3.9 3.6 - 5.5 mmol/L    Chloride 105 96 - 112 mmol/L    Co2 25 20 - 33 mmol/L    Anion Gap 9.0 0.0 - 11.9    Glucose 104 (H) 65 - 99 mg/dL    Bun 16 8 - 22 mg/dL    Creatinine 0.93 0.50 - 1.40 mg/dL    Calcium 9.5 8.5 - 10.5 mg/dL    AST(SGOT) 22 12 - 45 U/L    ALT(SGPT) 37 2 - 50 U/L    Alkaline Phosphatase 85 30 - 99 U/L    Total Bilirubin 0.4 0.1 - 1.5 mg/dL    Albumin 4.1 3.2 - 4.9 g/dL    Total Protein 6.7 6.0 - 8.2 g/dL    Globulin 2.6 1.9 - 3.5 g/dL    A-G Ratio 1.6 g/dL   ESTIMATED GFR   Result Value Ref Range    GFR If African American >60 >60 mL/min/1.73 m 2    GFR If Non African American >60 >60 mL/min/1.73 m 2   ACCU-CHEK GLUCOSE   Result Value Ref Range    Glucose - Accu-Ck 92 65 - 99 mg/dL   ACCU-CHEK GLUCOSE   Result Value Ref Range    Glucose - Accu-Ck 146 (H) 65 - 99 mg/dL   ACCU-CHEK GLUCOSE   Result Value Ref Range    Glucose - Accu-Ck 90 65 - 99 mg/dL   ACCU-CHEK GLUCOSE   Result Value Ref Range    Glucose - Accu-Ck 36 (LL) 65 - 99 mg/dL   ACCU-CHEK GLUCOSE   Result Value Ref Range    Glucose - Accu-Ck 185 (H) 65 - 99 mg/dL   ACCU-CHEK GLUCOSE   Result Value Ref Range    Glucose -  Accu-Ck 191 (H) 65 - 99 mg/dL   ACCU-CHEK GLUCOSE   Result Value Ref Range    Glucose - Accu-Ck 50 (L) 65 - 99 mg/dL   ACCU-CHEK GLUCOSE   Result Value Ref Range    Glucose - Accu-Ck 43 (L) 65 - 99 mg/dL   ACCU-CHEK GLUCOSE   Result Value Ref Range    Glucose - Accu-Ck 100 (H) 65 - 99 mg/dL   EKG   Result Value Ref Range    Report       [facility]    Test Date:  2019  Pt Name:    JO MUÑOZ                 Department: [facility]  MRN:        [id number]                      Room:       S133  Gender:     Male                         Technician: JADIEL  :        1985                   Requested By:CUCO ARREDONDO  Order #:    [id number]                    Reading MD:    Measurements  Intervals                                Axis  Rate:       89                           P:          49  MD:         148                          QRS:        82  QRSD:       80                           T:          41  QT:         352  QTc:        429    Interpretive Statements  SINUS RHYTHM  Compared to ECG 2018 19:01:48  Sinus tachycardia no longer present  Prolonged QT interval no longer present         All labs reviewed by me.    COURSE & MEDICAL DECISION MAKING  Pertinent Labs & Imaging studies reviewed. (See chart for details)    Obtained and reviewed past medical records.  Patient's last encounter was an admission to the hospital with type 1 diabetes, DKA and acidosis.  This was in November of this year.    4:36 AM - Patient seen and examined at bedside.  Patient somnolent but he is able to stay awake, long enough to answer some short questions.  He does need repeated questioning to get through the exam.  Patient presents on a D10 drip.  This will be discontinued, will keep a close watch on his blood sugar levels.  Labs will be drawn per nursing protocols.     Differential diagnosis includes but not limited to: Unintentional versus intentional overdose of his, substance abuse.    6:38 AM, Accu-Cheks noted to be in the  40s.  This is on the 10 drip at 50 an hour.  We will increase to 100 and hour and recheck.  Given his labile blood sugar and severe hypoglycemia, patient will need admission to the hospital.    6:40 AM, discussed with Dr. Romeo, hospitalist, who agrees to admit the patient to their service.  He will contact the intensivist.    Repeat Accu-Chek noted to be high.  This is after just giving a bolus of D50 for hypoglycemia.  We will continue to monitor closely.    7:23 AM accucheck 50, despite D10 drip increase in rate to 100 for the last hour.  D/W pharmacist, Zach who rec increased D10 to 150/hr. Will cont with q 1hr Accu-Cheks.    7:29 AM nurse made aware patient is still sleepy but awakensing and can answer questions appro.    8 AM, ICU physician at the bedside.  Discussed with Dr. Romeo, increase in D10 drip.  We will continue to monitor closely.    The total critical care time on this patient is 40 minutes, resuscitating patient, speaking with admitting physician, and deciphering test results. This 40 minutes is exclusive of separately billable procedures.    Stable but guarded condition    FINAL IMPRESSION  1. Hypoglycemia       Critical care time 40 minutes

## 2019-04-06 NOTE — ED NOTES
Fingerstick recheck 191.    Discussed with ERP Dr. Sanchez.    D10 infusion to continue at 100 mL/hr.

## 2019-04-06 NOTE — H&P
Hospital Medicine History & Physical Note    Date of Service  4/6/2019    Primary Care Physician  MAVIS Nair    Consultants  None    Code Status  Full code     Chief Complaint  Altered     History of Presenting Illness  33 y.o. male with history of diabetes mellitus type 1, on insulin regimen, major depression which is controlled on current medication regimen, and diabetic polyneuropathy on medication regimen, was in his usual state of health until the evening prior to admission.  He reports exercising with some friends, and then going home and taking insulin therapy.  He reports that he may have not eaten very much before he took it or afterwards.  He then fell asleep, and remembers waking up to the emergency medical services were apparently called by his roommates.  He currently denies any headache or vision changes, he feels tired, no chest pain or shortness of breath, no abdominal pain, nausea vomiting, diarrhea or constipation.  No other complaints.    Review of Systems  Review of Systems   Constitutional: Positive for malaise/fatigue.   HENT: Negative.    Eyes: Negative.    Respiratory: Negative.    Cardiovascular: Negative.    Gastrointestinal: Negative.    Genitourinary: Negative.    Musculoskeletal: Negative.    Skin: Negative.    Neurological: Negative.    Endo/Heme/Allergies: Negative.    Psychiatric/Behavioral: Negative.        Past Medical History   has a past medical history of Anxiety; Depression; Diabetes; Hypertension; and Seizure (HCC).    Surgical History   has a past surgical history that includes thoracotomy (Right).     Family History  family history includes Diabetes in his maternal grandfather; No Known Problems in his father; Seizures in his mother.     Social History   reports that he has been smoking Cigarettes.  He has a 4.25 pack-year smoking history. He has never used smokeless tobacco. He reports that he does not drink alcohol or use drugs.    Allergies  No Known  Allergies    Medications  Prior to Admission Medications   Prescriptions Last Dose Informant Patient Reported? Taking?   Alcohol Swabs   No No   Sig: Wipe site with prep pad prior to injection.   Blood Glucose Meter Kit   No No   Sig: Test blood sugar as recommended by provider. One Touch Verio blood glucose monitoring kit.   Blood Glucose Test Strips   No No   Sig: Use one One Touch Verio strip to test blood sugar four times day (before meals and bedtime)   DULoxetine (CYMBALTA) 60 MG Cap DR Particles delayed-release capsule   No No   Sig: Take 1 Cap by mouth every day.   Insulin Glargine (BASAGLAR KWIKPEN) 100 UNIT/ML Solution Pen-injector   No No   Sig: Inject 30 Units as instructed 2 Times a Day.   Insulin Pen Needle 32 G x 4 mm   No No   Sig: Use one pen needle in pen device to inject insulin  four times daily (before meals and bedtime).   Lancets   No No   Sig: Use one One Touch Verio lancet to test blood sugar four times daily (before meals and bedtime).   Melatonin 10 MG SL Tab   Yes No   Sig: TAKE 2 TABLETS BY MOUTH AT BEDTIME TO HELP WITH SLEEP   gabapentin (NEURONTIN) 100 MG Cap   No No   Sig: Take 7 Caps by mouth 3 times a day.   hydrOXYzine HCl (ATARAX) 25 MG Tab   No No   Sig: Take 1 Tab by mouth 3 times a day as needed for Anxiety.   ibuprofen (MOTRIN) 600 MG Tab   Yes No   Sig: TAKE 1 TABLET BY MOUTH 3 TIMES DAILT AS NEEDED FOR ACHES SORE THROAT  TAKE WITH A LARGE GLASS OF WATER   insulin lispro, Human, (HUMALOG) 100 UNIT/ML Solution Pen-injector injection   No No   Sig: Inject 7 Units as instructed 3 times a day before meals.   lisinopril (PRINIVIL) 10 MG Tab   Yes No   Sig: TAKE 1 TABLET BY MOUTH EVERY DAY TO LOWER BLOOD PRESSURE      Facility-Administered Medications: None       Physical Exam  Temp:  [36.6 °C (97.9 °F)] 36.6 °C (97.9 °F)  Pulse:  [] 94  Resp:  [14-21] 21  SpO2:  [90 %-95 %] 91 %    Physical Exam   Constitutional: He appears well-developed and well-nourished. No distress.    HENT:   Head: Normocephalic and atraumatic.   Eyes: Pupils are equal, round, and reactive to light. Conjunctivae are normal.   Neck: Normal range of motion. Neck supple. No tracheal deviation present. No thyromegaly present.   Cardiovascular: Normal rate, regular rhythm and normal heart sounds.  Exam reveals no gallop and no friction rub.    No murmur heard.  Pulmonary/Chest: Effort normal and breath sounds normal. No respiratory distress. He has no wheezes.   Abdominal: Soft. Bowel sounds are normal. He exhibits no distension and no mass. There is no tenderness. There is no rebound and no guarding.   Musculoskeletal: Normal range of motion. He exhibits no edema.   Lymphadenopathy:     He has no cervical adenopathy.   Neurological: He is alert. No cranial nerve deficit.   Groggy, somewhat difficult to arouse   Skin: Skin is warm and dry. He is not diaphoretic.   Psychiatric: He has a normal mood and affect.   Nursing note and vitals reviewed.      Laboratory:  Recent Labs      04/06/19   0432   WBC  5.9   RBC  5.51   HEMOGLOBIN  16.0   HEMATOCRIT  50.0   MCV  90.7   MCH  29.0   MCHC  32.0*   RDW  43.3   PLATELETCT  242   MPV  10.1     Recent Labs      04/06/19   0432   SODIUM  139   POTASSIUM  3.9   CHLORIDE  105   CO2  25   GLUCOSE  104*   BUN  16   CREATININE  0.93   CALCIUM  9.5     Recent Labs      04/06/19   0432   ALTSGPT  37   ASTSGOT  22   ALKPHOSPHAT  85   TBILIRUBIN  0.4   GLUCOSE  104*                 No results for input(s): TROPONINI in the last 72 hours.    Urinalysis:    No results found     Imaging:  No orders to display         Assessment/Plan:  I anticipate this patient will require at least two midnights for appropriate medical management, necessitating inpatient admission.    * Hypoglycemia- (present on admission)   Assessment & Plan    On large amount of insulin, and apparently took insulin without eating.  Currently somewhat controlled with D10 NS infusion, which will be continued, along with  Q30 minutes accu-chek until stable. ICU admission.      Type I diabetes mellitus (HCC)- (present on admission)   Assessment & Plan    On insulin therapy with questionable correct compliance and dietary intake given current hypoglycemia.     Diabetic polyneuropathy associated with type 1 diabetes mellitus (HCC)- (present on admission)   Assessment & Plan    On medication regimen for the same.  Monitor.          VTE prophylaxis: SCD, lovenox

## 2019-04-06 NOTE — CONSULTS
Critical Care Consultation    Date of consult: 4/6/2019    Referring Physician  Lisa Sanchez D.O.    Reason for Consultation  Severe hypoglycemia    History of Presenting Illness  33 y.o. male who presented 4/6/2019 with hypoglycemia, profound.  Per EMS 41, then 43 after d50 and orange juice.  Patient says he is compliant with bid 40 u insulin but not necessarily with short acting insulin.  He takes sq.  Yesterday exercised and did not eat a good meal and took his regular dose insulin.  He denies drug use (none for a year) and etoh use (sober 6 years).  He denies seizures but did bite his tongue.  Limited recollection of events.  Per his roommates he had a fall and was ambulating poorly and limited ability to stay on his feet.  No seizure activity noted by friends.  Hx of seizures and has had DKA with coma in the past, last time in 2017.  He received 6 doses d50w, then started on d10, now on 150 ml/hr and stabilized 80-90s.  He denies abdominal pain, chest pain, cough/sob, headache or urinary pain. No recent illness.  He feels hungry.    Code Status  Full Code    Review of Systems  Review of Systems   Constitutional: Positive for malaise/fatigue. Negative for chills, diaphoresis, fever and weight loss.   HENT: Negative for congestion and sinus pain.         Bit his tongue   Eyes: Negative for blurred vision, double vision and photophobia.   Respiratory: Negative for cough, hemoptysis, sputum production, shortness of breath, wheezing and stridor.    Cardiovascular: Negative for chest pain, palpitations and leg swelling.   Gastrointestinal: Negative for blood in stool, heartburn, melena and vomiting.   Genitourinary: Negative for dysuria and urgency.   Musculoskeletal: Negative for back pain, myalgias and neck pain.   Skin: Negative for itching and rash.   Neurological: Positive for weakness. Negative for dizziness, tingling, sensory change, speech change, focal weakness and headaches.   Endo/Heme/Allergies:  Negative for polydipsia. Does not bruise/bleed easily.   Psychiatric/Behavioral: Negative for depression. The patient is not nervous/anxious.        Past Medical History   has a past medical history of Anxiety; Depression; Diabetes; Hypertension; and Seizure (HCC).    Surgical History   has a past surgical history that includes thoracotomy (Right).    Family History  family history includes Diabetes in his maternal grandfather; No Known Problems in his father; Seizures in his mother.    Social History   reports that he has been smoking Cigarettes.  He has a 4.25 pack-year smoking history. He has never used smokeless tobacco. He reports that he does not drink alcohol or use drugs.    Medications  Home Medications     Reviewed by Alberta Demarco R.N. (Registered Nurse) on 04/06/19 at 0429  Med List Status: Partial   Medication Last Dose Status   Alcohol Swabs  Active   Blood Glucose Meter Kit  Active   Blood Glucose Test Strips  Active   DULoxetine (CYMBALTA) 60 MG Cap DR Particles delayed-release capsule  Active   gabapentin (NEURONTIN) 100 MG Cap  Active   hydrOXYzine HCl (ATARAX) 25 MG Tab  Active   ibuprofen (MOTRIN) 600 MG Tab  Active   Insulin Glargine (BASAGLAR KWIKPEN) 100 UNIT/ML Solution Pen-injector  Active   insulin lispro, Human, (HUMALOG) 100 UNIT/ML Solution Pen-injector injection  Active   Insulin Pen Needle 32 G x 4 mm  Active   Lancets  Active   lisinopril (PRINIVIL) 10 MG Tab  Active   Melatonin 10 MG SL Tab  Active              Current Facility-Administered Medications   Medication Dose Route Frequency Provider Last Rate Last Dose   • DULoxetine (CYMBALTA) capsule 30 mg  30 mg Oral DAILY Dennis Romeo M.D.       • gabapentin (NEURONTIN) capsule 700 mg  700 mg Oral TID Dennis Romeo M.D.       • acetaminophen (TYLENOL) tablet 650 mg  650 mg Oral Q6HRS PRN Dennis Romeo M.D.       • cloNIDine (CATAPRES) tablet 0.1 mg  0.1 mg Oral Q6HRS PRN Dennis Romeo M.D.       • ondansetron (ZOFRAN)  syringe/vial injection 4 mg  4 mg Intravenous Q4HRS PRN Dennis Romeo M.D.       • ondansetron (ZOFRAN ODT) dispertab 4 mg  4 mg Oral Q4HRS PRN Dennis Romeo M.D.       • promethazine (PHENERGAN) tablet 12.5-25 mg  12.5-25 mg Oral Q4HRS PRN Dennis Romeo M.D.       • promethazine (PHENERGAN) suppository 12.5-25 mg  12.5-25 mg Rectal Q4HRS PRN Dennis Romeo M.D.       • prochlorperazine (COMPAZINE) injection 5-10 mg  5-10 mg Intravenous Q4HRS PRN Dennis Romeo M.D.       • senna-docusate (PERICOLACE or SENOKOT S) 8.6-50 MG per tablet 2 Tab  2 Tab Oral BID Dennis Romeo M.D.        And   • polyethylene glycol/lytes (MIRALAX) PACKET 1 Packet  1 Packet Oral QDAY PRN Dennis Romeo M.D.        And   • magnesium hydroxide (MILK OF MAGNESIA) suspension 30 mL  30 mL Oral QDAY PRN Dennis Romeo M.D.        And   • bisacodyl (DULCOLAX) suppository 10 mg  10 mg Rectal QDAY PRN Dennis Romeo M.D.       • [START ON 4/7/2019] enoxaparin (LOVENOX) inj 40 mg  40 mg Subcutaneous DAILY Dennis Romeo M.D.       • sodium chloride 154 mEq in dextrose 10% 1,000 mL infusion   Intravenous Continuous Dennis Romeo M.D.         Current Outpatient Prescriptions   Medication Sig Dispense Refill   • ibuprofen (MOTRIN) 600 MG Tab TAKE 1 TABLET BY MOUTH 3 TIMES DAILT AS NEEDED FOR ACHES SORE THROAT  TAKE WITH A LARGE GLASS OF WATER  0   • lisinopril (PRINIVIL) 10 MG Tab TAKE 1 TABLET BY MOUTH EVERY DAY TO LOWER BLOOD PRESSURE  1   • Melatonin 10 MG SL Tab TAKE 2 TABLETS BY MOUTH AT BEDTIME TO HELP WITH SLEEP  1   • hydrOXYzine HCl (ATARAX) 25 MG Tab Take 1 Tab by mouth 3 times a day as needed for Anxiety. 90 Tab 0   • gabapentin (NEURONTIN) 100 MG Cap Take 7 Caps by mouth 3 times a day. 90 Cap 0   • DULoxetine (CYMBALTA) 60 MG Cap DR Particles delayed-release capsule Take 1 Cap by mouth every day. 30 Cap 0   • Insulin Glargine (BASAGLAR KWIKPEN) 100 UNIT/ML Solution Pen-injector Inject 30 Units as instructed 2 Times a Day. 15  PEN 3   • Blood Glucose Meter Kit Test blood sugar as recommended by provider. One Touch Verio blood glucose monitoring kit. 1 Kit 3   • Blood Glucose Test Strips Use one One Touch Verio strip to test blood sugar four times day (before meals and bedtime) 120 Strip 3   • Lancets Use one One Touch Verio lancet to test blood sugar four times daily (before meals and bedtime). 120 Each 3   • Alcohol Swabs Wipe site with prep pad prior to injection. 120 Each 3   • Insulin Pen Needle 32 G x 4 mm Use one pen needle in pen device to inject insulin  four times daily (before meals and bedtime). 120 Each 3   • insulin lispro, Human, (HUMALOG) 100 UNIT/ML Solution Pen-injector injection Inject 7 Units as instructed 3 times a day before meals. 5 PEN 3       Allergies  No Known Allergies    Vital Signs last 24 hours  Temp:  [36.6 °C (97.9 °F)] 36.6 °C (97.9 °F)  Pulse:  [] 94  Resp:  [14-21] 21  SpO2:  [90 %-95 %] 91 %    Physical Exam  Physical Exam   Constitutional: He is oriented to person, place, and time. He appears distressed.   HENT:   Head: Normocephalic and atraumatic.   Right Ear: External ear normal.   Left Ear: External ear normal.   Mouth/Throat: No oropharyngeal exudate.   Eyes: Pupils are equal, round, and reactive to light. Conjunctivae and EOM are normal. Right eye exhibits no discharge. Left eye exhibits no discharge.   Neck: No JVD present. No tracheal deviation present.   Cardiovascular: Regular rhythm and normal heart sounds.    No murmur heard.  Sinus tachycardia   Pulmonary/Chest: Effort normal and breath sounds normal. No stridor. No respiratory distress. He has no wheezes. He has no rales. He exhibits no tenderness.   Abdominal: He exhibits no mass. There is no tenderness. There is no rebound and no guarding.   Musculoskeletal: He exhibits no edema, tenderness or deformity.   Neurological: He is alert and oriented to person, place, and time. He displays normal reflexes. No cranial nerve deficit.  Coordination normal.   Skin: Skin is warm. No rash noted. He is diaphoretic. No erythema.   Psychiatric:   fatigued   Nursing note and vitals reviewed.      Fluids  No intake or output data in the 24 hours ending 04/06/19 0751    Laboratory  Recent Results (from the past 48 hour(s))   ACCU-CHEK GLUCOSE    Collection Time: 04/06/19  4:28 AM   Result Value Ref Range    Glucose - Accu-Ck 92 65 - 99 mg/dL   CBC w/ Differential    Collection Time: 04/06/19  4:32 AM   Result Value Ref Range    WBC 5.9 4.8 - 10.8 K/uL    RBC 5.51 4.70 - 6.10 M/uL    Hemoglobin 16.0 14.0 - 18.0 g/dL    Hematocrit 50.0 42.0 - 52.0 %    MCV 90.7 81.4 - 97.8 fL    MCH 29.0 27.0 - 33.0 pg    MCHC 32.0 (L) 33.7 - 35.3 g/dL    RDW 43.3 35.9 - 50.0 fL    Platelet Count 242 164 - 446 K/uL    MPV 10.1 9.0 - 12.9 fL    Neutrophils-Polys 50.30 44.00 - 72.00 %    Lymphocytes 32.70 22.00 - 41.00 %    Monocytes 11.30 0.00 - 13.40 %    Eosinophils 4.70 0.00 - 6.90 %    Basophils 0.80 0.00 - 1.80 %    Immature Granulocytes 0.20 0.00 - 0.90 %    Nucleated RBC 0.00 /100 WBC    Neutrophils (Absolute) 2.99 1.82 - 7.42 K/uL    Lymphs (Absolute) 1.94 1.00 - 4.80 K/uL    Monos (Absolute) 0.67 0.00 - 0.85 K/uL    Eos (Absolute) 0.28 0.00 - 0.51 K/uL    Baso (Absolute) 0.05 0.00 - 0.12 K/uL    Immature Granulocytes (abs) 0.01 0.00 - 0.11 K/uL    NRBC (Absolute) 0.00 K/uL   Complete Metabolic Panel (CMP)    Collection Time: 04/06/19  4:32 AM   Result Value Ref Range    Sodium 139 135 - 145 mmol/L    Potassium 3.9 3.6 - 5.5 mmol/L    Chloride 105 96 - 112 mmol/L    Co2 25 20 - 33 mmol/L    Anion Gap 9.0 0.0 - 11.9    Glucose 104 (H) 65 - 99 mg/dL    Bun 16 8 - 22 mg/dL    Creatinine 0.93 0.50 - 1.40 mg/dL    Calcium 9.5 8.5 - 10.5 mg/dL    AST(SGOT) 22 12 - 45 U/L    ALT(SGPT) 37 2 - 50 U/L    Alkaline Phosphatase 85 30 - 99 U/L    Total Bilirubin 0.4 0.1 - 1.5 mg/dL    Albumin 4.1 3.2 - 4.9 g/dL    Total Protein 6.7 6.0 - 8.2 g/dL    Globulin 2.6 1.9 - 3.5 g/dL     A-G Ratio 1.6 g/dL   ESTIMATED GFR    Collection Time: 04/06/19  4:32 AM   Result Value Ref Range    GFR If African American >60 >60 mL/min/1.73 m 2    GFR If Non African American >60 >60 mL/min/1.73 m 2   ACCU-CHEK GLUCOSE    Collection Time: 04/06/19  4:41 AM   Result Value Ref Range    Glucose - Accu-Ck 146 (H) 65 - 99 mg/dL   ACCU-CHEK GLUCOSE    Collection Time: 04/06/19  5:29 AM   Result Value Ref Range    Glucose - Accu-Ck 90 65 - 99 mg/dL   ACCU-CHEK GLUCOSE    Collection Time: 04/06/19  5:57 AM   Result Value Ref Range    Glucose - Accu-Ck 36 (LL) 65 - 99 mg/dL   ACCU-CHEK GLUCOSE    Collection Time: 04/06/19  6:08 AM   Result Value Ref Range    Glucose - Accu-Ck 185 (H) 65 - 99 mg/dL   ACCU-CHEK GLUCOSE    Collection Time: 04/06/19  6:44 AM   Result Value Ref Range    Glucose - Accu-Ck 191 (H) 65 - 99 mg/dL   ACCU-CHEK GLUCOSE    Collection Time: 04/06/19  7:21 AM   Result Value Ref Range    Glucose - Accu-Ck 50 (L) 65 - 99 mg/dL       Imaging  No orders to display       Assessment/Plan  * Hypoglycemia- (present on admission)   Assessment & Plan    Severe hypoglycemia  Seems dehydrated  On d10, if bs remains low after going to 200 ml/hr then will start d20 drip     Type I diabetes mellitus (HCC)- (present on admission)   Assessment & Plan    On insulin at home, hold for now  ? Compliance  hgaic 15.9 4 mos ago  Repeat  d10 drip for now  2 L LR       Diabetic polyneuropathy associated with type 1 diabetes mellitus (HCC)- (present on admission)   Assessment & Plan    On insulin at home sq, 40 u bid  ssi but poorly compliant  Give diet  d10w  May need d20  Regular diet     Encephalopathy acute   Assessment & Plan    Secondary to severe hypoglycemia  Aggressively supplement glucose  Feed  More alert currently, no neuro deficits, gcs > 13         Discussed patient condition and risk of morbidity and/or mortality with Hospitalist, RN, RT, Pharmacy, Code status disscussed, Charge nurse / hot rounds and Patient.       The patient remains critically ill.  Critical care time = 35 minutes in directly providing and coordinating critical care and extensive data review.  No time overlap and excludes procedures.

## 2019-04-06 NOTE — PROGRESS NOTES
Patient seen and discussed with nursing staff and Dr Delaney  Admitted with profound hypoglycemia after taking his usual dose of basaglar last evening and not eating much and exercising.  Continue close monitoring og=f CBG's and D10 infusion, start diet.  Possible inadvertent overdose given degree of hypoglycemia in setting of uncontrolled DM last available Hba1c for review from 11-24-18 15.9

## 2019-04-06 NOTE — CARE PLAN
Problem: Knowledge Deficit  Goal: Knowledge of disease process/condition, treatment plan, diagnostic tests, and medications will improve    Intervention: Assess knowledge level of disease process/condition, treatment plan, diagnostic tests, and medications  Plan of care reviewed with patient and patient's wife      Problem: Fluid Volume:  Goal: Will maintain balanced intake and output    Intervention: Monitor, educate, and encourage compliance with therapeutic intake of liquids  Accurate I and O's

## 2019-04-06 NOTE — ASSESSMENT & PLAN NOTE
On large amount of insulin, and apparently took insulin without eating.  Currently somewhat controlled with D10 NS infusion, which will be continued, along with Q30 minutes accu-chek until stable. ICU admission.

## 2019-04-06 NOTE — ASSESSMENT & PLAN NOTE
On insulin at home sq, 40 u bid  ssi but poorly compliant  Give diet  d10w  May need d20  Regular diet

## 2019-04-06 NOTE — ED NOTES
Fingerstick recheck 36 after 30 minutes.    D10 infusion initiated per orders.    ERP Dr. Sanchez notified.

## 2019-04-06 NOTE — ED NOTES
D50 given IVP and D10 rate changed to 100 mL/hr as per Dr. Sanchez.    Pateint remains lethargic, responsive to verbal stimuli. Able to converse and answer questions appropriately with staff.

## 2019-04-06 NOTE — ASSESSMENT & PLAN NOTE
On insulin therapy with questionable correct compliance and dietary intake given current hypoglycemia.

## 2019-04-06 NOTE — ASSESSMENT & PLAN NOTE
Severe hypoglycemia  Seems dehydrated  On d10, if bs remains low after going to 200 ml/hr then will start d20 drip

## 2019-04-06 NOTE — ASSESSMENT & PLAN NOTE
On insulin at home, hold for now  ? Compliance  aic 15.9 4 mos ago  Repeat  d10 drip for now  2 L LR

## 2019-04-06 NOTE — ASSESSMENT & PLAN NOTE
Secondary to severe hypoglycemia  Aggressively supplement glucose  Feed  More alert currently, no neuro deficits, gcs > 13

## 2019-04-07 VITALS
OXYGEN SATURATION: 97 % | BODY MASS INDEX: 27.14 KG/M2 | TEMPERATURE: 98.1 F | SYSTOLIC BLOOD PRESSURE: 139 MMHG | RESPIRATION RATE: 17 BRPM | HEIGHT: 70 IN | DIASTOLIC BLOOD PRESSURE: 88 MMHG | HEART RATE: 96 BPM | WEIGHT: 189.6 LBS

## 2019-04-07 LAB
ANION GAP SERPL CALC-SCNC: 4 MMOL/L (ref 0–11.9)
BASOPHILS # BLD AUTO: 0.6 % (ref 0–1.8)
BASOPHILS # BLD: 0.04 K/UL (ref 0–0.12)
BUN SERPL-MCNC: 8 MG/DL (ref 8–22)
CALCIUM SERPL-MCNC: 8.5 MG/DL (ref 8.5–10.5)
CHLORIDE SERPL-SCNC: 108 MMOL/L (ref 96–112)
CO2 SERPL-SCNC: 25 MMOL/L (ref 20–33)
CREAT SERPL-MCNC: 0.79 MG/DL (ref 0.5–1.4)
EOSINOPHIL # BLD AUTO: 0.28 K/UL (ref 0–0.51)
EOSINOPHIL NFR BLD: 4.4 % (ref 0–6.9)
ERYTHROCYTE [DISTWIDTH] IN BLOOD BY AUTOMATED COUNT: 42.5 FL (ref 35.9–50)
GLUCOSE BLD-MCNC: 187 MG/DL (ref 65–99)
GLUCOSE BLD-MCNC: 238 MG/DL (ref 65–99)
GLUCOSE BLD-MCNC: 239 MG/DL (ref 65–99)
GLUCOSE BLD-MCNC: 277 MG/DL (ref 65–99)
GLUCOSE SERPL-MCNC: 272 MG/DL (ref 65–99)
HCT VFR BLD AUTO: 46.4 % (ref 42–52)
HGB BLD-MCNC: 15.3 G/DL (ref 14–18)
IMM GRANULOCYTES # BLD AUTO: 0.02 K/UL (ref 0–0.11)
IMM GRANULOCYTES NFR BLD AUTO: 0.3 % (ref 0–0.9)
LYMPHOCYTES # BLD AUTO: 1.7 K/UL (ref 1–4.8)
LYMPHOCYTES NFR BLD: 26.6 % (ref 22–41)
MCH RBC QN AUTO: 29.5 PG (ref 27–33)
MCHC RBC AUTO-ENTMCNC: 33 G/DL (ref 33.7–35.3)
MCV RBC AUTO: 89.6 FL (ref 81.4–97.8)
MONOCYTES # BLD AUTO: 0.59 K/UL (ref 0–0.85)
MONOCYTES NFR BLD AUTO: 9.2 % (ref 0–13.4)
NEUTROPHILS # BLD AUTO: 3.75 K/UL (ref 1.82–7.42)
NEUTROPHILS NFR BLD: 58.9 % (ref 44–72)
NRBC # BLD AUTO: 0 K/UL
NRBC BLD-RTO: 0 /100 WBC
PLATELET # BLD AUTO: 240 K/UL (ref 164–446)
PMV BLD AUTO: 10.6 FL (ref 9–12.9)
POTASSIUM SERPL-SCNC: 4 MMOL/L (ref 3.6–5.5)
RBC # BLD AUTO: 5.18 M/UL (ref 4.7–6.1)
SODIUM SERPL-SCNC: 137 MMOL/L (ref 135–145)
WBC # BLD AUTO: 6.4 K/UL (ref 4.8–10.8)

## 2019-04-07 PROCEDURE — A9270 NON-COVERED ITEM OR SERVICE: HCPCS | Performed by: HOSPITALIST

## 2019-04-07 PROCEDURE — 700111 HCHG RX REV CODE 636 W/ 250 OVERRIDE (IP): Performed by: HOSPITALIST

## 2019-04-07 PROCEDURE — 700102 HCHG RX REV CODE 250 W/ 637 OVERRIDE(OP): Performed by: INTERNAL MEDICINE

## 2019-04-07 PROCEDURE — 700102 HCHG RX REV CODE 250 W/ 637 OVERRIDE(OP): Performed by: HOSPITALIST

## 2019-04-07 PROCEDURE — 99239 HOSP IP/OBS DSCHRG MGMT >30: CPT | Performed by: HOSPITALIST

## 2019-04-07 PROCEDURE — 82962 GLUCOSE BLOOD TEST: CPT

## 2019-04-07 PROCEDURE — A9270 NON-COVERED ITEM OR SERVICE: HCPCS | Performed by: INTERNAL MEDICINE

## 2019-04-07 PROCEDURE — 85025 COMPLETE CBC W/AUTO DIFF WBC: CPT

## 2019-04-07 PROCEDURE — 80048 BASIC METABOLIC PNL TOTAL CA: CPT

## 2019-04-07 RX ORDER — INSULIN GLARGINE 100 [IU]/ML
20 INJECTION, SOLUTION SUBCUTANEOUS 2 TIMES DAILY
Qty: 15 PEN | Refills: 3 | Status: SHIPPED | OUTPATIENT
Start: 2019-04-07

## 2019-04-07 RX ORDER — INSULIN GLARGINE 100 [IU]/ML
20 INJECTION, SOLUTION SUBCUTANEOUS 2 TIMES DAILY
Status: DISCONTINUED | OUTPATIENT
Start: 2019-04-07 | End: 2019-04-07 | Stop reason: HOSPADM

## 2019-04-07 RX ADMIN — GABAPENTIN 700 MG: 400 CAPSULE ORAL at 05:17

## 2019-04-07 RX ADMIN — NICOTINE 21 MG: 21 PATCH, EXTENDED RELEASE TRANSDERMAL at 05:17

## 2019-04-07 RX ADMIN — ENOXAPARIN SODIUM 40 MG: 100 INJECTION SUBCUTANEOUS at 05:17

## 2019-04-07 RX ADMIN — ACETAMINOPHEN 650 MG: 325 TABLET, FILM COATED ORAL at 10:40

## 2019-04-07 RX ADMIN — INSULIN GLARGINE 20 UNITS: 100 INJECTION, SOLUTION SUBCUTANEOUS at 09:39

## 2019-04-07 RX ADMIN — OXYCODONE HYDROCHLORIDE 5 MG: 5 TABLET ORAL at 06:05

## 2019-04-07 RX ADMIN — INSULIN HUMAN 2 UNITS: 100 INJECTION, SOLUTION PARENTERAL at 07:59

## 2019-04-07 RX ADMIN — DULOXETINE HYDROCHLORIDE 30 MG: 30 CAPSULE, DELAYED RELEASE ORAL at 05:18

## 2019-04-07 RX ADMIN — INSULIN HUMAN 3 UNITS: 100 INJECTION, SOLUTION PARENTERAL at 11:20

## 2019-04-07 ASSESSMENT — COGNITIVE AND FUNCTIONAL STATUS - GENERAL
SUGGESTED CMS G CODE MODIFIER MOBILITY: CH
MOBILITY SCORE: 24
SUGGESTED CMS G CODE MODIFIER DAILY ACTIVITY: CH
DAILY ACTIVITIY SCORE: 24

## 2019-04-07 NOTE — CARE PLAN
Problem: Pain Management  Goal: Pain level will decrease to patient's comfort goal  Outcome: PROGRESSING AS EXPECTED  Pt educated on use of 0-10 pain scale, non-pharmacological pain interventions in use, pain assessed at a minimum of q 2 hrs.

## 2019-04-07 NOTE — CARE PLAN
Problem: Safety  Goal: Will remain free from injury  Outcome: PROGRESSING AS EXPECTED  Pt A/O x 4, up ad janes steady gait, No risk for falls, low risk for skin breakdown. Provided safety education, call light within reach, rounding in place.    Problem: Venous Thromboembolism (VTW)/Deep Vein Thrombosis (DVT) Prevention:  Goal: Patient will participate in Venous Thrombosis (VTE)/Deep Vein Thrombosis (DVT)Prevention Measures  Outcome: PROGRESSING SLOWER THAN EXPECTED  Lovenox. Refused SCDs.    Problem: Bowel/Gastric:  Goal: Normal bowel function is maintained or improved  Outcome: PROGRESSING AS EXPECTED  BM today per pt report. Promote PO intake food, fluids. Promote mobility.

## 2019-04-07 NOTE — DISCHARGE SUMMARY
Discharge Summary    CHIEF COMPLAINT ON ADMISSION  Chief Complaint   Patient presents with   • ALOC   • Hypoglycemia       Reason for Admission  EMS     Admission Date  4/6/2019    CODE STATUS  Full Code    HPI & HOSPITAL COURSE  This is a 33 y.o. male here with history of type 1 diabetes has been taking 40 units of basal regular insulin twice a day transferred to the emergency room with severe hypoglycemia requiring admission to the ICU with D10 infusion and close monitoring.  His hypoglycemia resolved, his D10 was discontinued last evening and he was started on Lantus 20 units twice daily.  I counseled the patient about the importance of complying with his prescribed insulin monitoring his blood sugars.  I recommended he decreases his home dose to 20 units twice a day, reviewed signs and treatment of hypoglycemia, discussed with him having small snack between meals and at bedtime, strongly encouraged him to follow-up with his PCP next week with a list of his blood sugars.  He complained of headache last night and received p.o. Oxycodone.  His neurologic exam is intact on my evaluation today he denies any headache.       Therefore, he is discharged in good and stable condition to home with close outpatient follow-up.    The patient recovered much more quickly than anticipated on admission.    Discharge Date  4/7/2019    FOLLOW UP ITEMS POST DISCHARGE  Monitor blood sugars and follow-up with PCP    DISCHARGE DIAGNOSES  Principal Problem (Resolved):    Hypoglycemia POA: Yes  Active Problems:    Type I diabetes mellitus (HCC) POA: Yes    Diabetic polyneuropathy associated with type 1 diabetes mellitus (HCC) POA: Yes    Encephalopathy acute POA: Unknown      FOLLOW UP  Future Appointments  Date Time Provider Department Center   5/29/2019 11:00 AM Roxy Vail M.D. [facility] None     [facility] Nair  [address] [place] [id number]  [phone]            MEDICATIONS ON DISCHARGE     Medication List      CHANGE  how you take these medications      Instructions   BASAGLAR KWIKPEN 100 UNIT/ML Sopn  What changed:  how much to take   Inject 20 Units as instructed 2 Times a Day.  Dose:  20 Units        CONTINUE taking these medications      Instructions   DULoxetine 30 MG Cpep  Commonly known as:  CYMBALTA   Take 30 mg by mouth every day.  Dose:  30 mg     gabapentin 100 MG Caps  Commonly known as:  NEURONTIN   Take 700 mg by mouth 2 Times a Day.  Dose:  700 mg     hydrOXYzine HCl 25 MG Tabs  Commonly known as:  ATARAX   Take 1 Tab by mouth 3 times a day as needed for Anxiety.  Dose:  25 mg            Allergies  No Known Allergies    DIET  Orders Placed This Encounter   Procedures   • Diet Order Consistent Carbohydrate     Standing Status:   Standing     Number of Occurrences:   1     Order Specific Question:   Diet:     Answer:   Consistent Carbohydrate [4]       ACTIVITY  As tolerated.  Weight bearing as tolerated    CONSULTATIONS  Critical care    PROCEDURES  None    LABORATORY  Lab Results   Component Value Date    SODIUM 137 04/07/2019    POTASSIUM 4.0 04/07/2019    CHLORIDE 108 04/07/2019    CO2 25 04/07/2019    GLUCOSE 272 (H) 04/07/2019    BUN 8 04/07/2019    CREATININE 0.79 04/07/2019        Lab Results   Component Value Date    WBC 6.4 04/07/2019    HEMOGLOBIN 15.3 04/07/2019    HEMATOCRIT 46.4 04/07/2019    PLATELETCT 240 04/07/2019        Total time of the discharge process exceeds 35 minutes.

## 2019-04-07 NOTE — PROGRESS NOTES
Discharge orders and prescriptions reviewed with patient. Pt verbalized understanding of the orders. Will f/u with PCP. Both IVs removed, tips intact. Friends will take patient home. Pt discharged to home and refused hospital escort, left walking with friends.  Pt states he has all his personal belongings and all questions were answered.

## 2019-04-07 NOTE — CARE PLAN
Problem: Safety  Goal: Will remain free from falls    Intervention: Assess risk factors for falls  Patient asked to call for assistance before attempting to get up out of bed      Problem: Knowledge Deficit  Goal: Knowledge of disease process/condition, treatment plan, diagnostic tests, and medications will improve    Intervention: Assess knowledge level of disease process/condition, treatment plan, diagnostic tests, and medications  Plan of care for today reviewed with patient.

## 2019-04-07 NOTE — PROGRESS NOTES
Pt arrived to floor after report from Ryanne in ICU.    A/O x 4, HR RRR, LS clear all fields. Denies pain, n/t. CMS+, YIN.    Oriented pt to floor and unit routine, including use of call light and need to call staff for assistance with mobility. Pt states understanding. Call light within reach, rounding in place.

## 2019-04-07 NOTE — DISCHARGE INSTRUCTIONS
Discharge Instructions    Discharged to home by car with relative. Discharged via walking, hospital escort: Refused.  Special equipment needed: Not Applicable    Be sure to schedule a follow-up appointment with your primary care doctor or any specialists as instructed.     Discharge Plan:   Diet Plan: Discussed  Activity Level: Discussed  Smoking Cessation Offered: Patient Refused  Confirmed Follow up Appointment: Patient to Call and Schedule Appointment  Confirmed Symptoms Management: Discussed  Medication Reconciliation Updated: Yes  Influenza Vaccine Indication: Not indicated: Previously immunized this influenza season and > 8 years of age    I understand that a diet low in cholesterol, fat, and sodium is recommended for good health. Unless I have been given specific instructions below for another diet, I accept this instruction as my diet prescription.   Other diet: diabetic, consistent carbohydrate    Special Instructions: None    · Is patient discharged on Warfarin / Coumadin?   No       Hypoglycemia   Hypoglycemia is when the sugar (glucose) level in the blood is too low. Symptoms of low blood sugar may include:  · Feeling:  ¨ Hungry.  ¨ Worried or nervous (anxious).  ¨ Sweaty and clammy.  ¨ Confused.  ¨ Dizzy.  ¨ Sleepy.  ¨ Sick to your stomach (nauseous).  · Having:  ¨ A fast heartbeat.  ¨ A headache.  ¨ A change in your vision.  ¨ Jerky movements that you cannot control (seizure).  ¨ Nightmares.  ¨ Tingling or no feeling (numbness) around the mouth, lips, or tongue.  · Having trouble with:  ¨ Talking.  ¨ Paying attention (concentrating).  ¨ Moving (coordination).  ¨ Sleeping.  · Shaking.  · Passing out (fainting).  · Getting upset easily (irritability).  Low blood sugar can happen to people who have diabetes and people who do not have diabetes. Low blood sugar can happen quickly, and it can be an emergency.  Treating Low Blood Sugar   Low blood sugar is often treated by eating or drinking something sugary  right away. If you can think clearly and swallow safely, follow the 15:15 rule:  · Take 15 grams of a fast-acting carb (carbohydrate). Some fast-acting carbs are:  ¨ 1 tube of glucose gel.  ¨ 3 sugar tablets (glucose pills).  ¨ 6-8 pieces of hard candy.  ¨ 4 oz (120 mL) of fruit juice.  ¨ 4 oz (120 mL) of regular (not diet) soda.  · Check your blood sugar 15 minutes after you take the carb.  · If your blood sugar is still at or below 70 mg/dL (3.9 mmol/L), take 15 grams of a carb again.  · If your blood sugar does not go above 70 mg/dL (3.9 mmol/L) after 3 tries, get help right away.  · After your blood sugar goes back to normal, eat a meal or a snack within 1 hour.  Treating Very Low Blood Sugar   If your blood sugar is at or below 54 mg/dL (3 mmol/L), you have very low blood sugar (severe hypoglycemia). This is an emergency. Do not wait to see if the symptoms will go away. Get medical help right away. Call your local emergency services (911 in the [place] Do not drive yourself to the hospital.  If you have very low blood sugar and you cannot eat or drink, you may need a glucagon shot (injection). A family member or friend should learn how to check your blood sugar and how to give you a glucagon shot. Ask your doctor if you need to have a glucagon shot kit at home.  Follow these instructions at home:  General instructions  · Avoid any diets that cause you to not eat enough food. Talk with your doctor before you start any new diet.  · Take over-the-counter and prescription medicines only as told by your doctor.  · Limit alcohol to no more than 1 drink per day for nonpregnant women and 2 drinks per day for men. One drink equals 12 oz of beer, 5 oz of wine, or 1½ oz of hard liquor.  · Keep all follow-up visits as told by your doctor. This is important.  If You Have Diabetes:   · Make sure you know the symptoms of low blood sugar.  · Always keep a source of sugar with you, such as:  ¨ Sugar.  ¨ Sugar tablets.  ¨ Glucose  gel.  ¨ Fruit juice.  ¨ Regular soda (not diet soda).  ¨ Milk.  ¨ Hard candy.  ¨ Honey.  · Take your medicines as told.  · Follow your exercise and meal plan.  ¨ Eat on time. Do not skip meals.  ¨ Follow your sick day plan when you cannot eat or drink normally. Make this plan ahead of time with your doctor.  · Check your blood sugar as often as told by your doctor. Always check before and after exercise.  · Share your diabetes care plan with:  ¨ Your work or school.  ¨ People you live with.  · Check your pee (urine) for ketones:  ¨ When you are sick.  ¨ As told by your doctor.  · Carry a card or wear jewelry that says you have diabetes.  If You Have Low Blood Sugar From Other Causes:   · Check your blood sugar as often as told by your doctor.  · Follow instructions from your doctor about what you cannot eat or drink.  Contact a doctor if:  · You have trouble keeping your blood sugar in your target range.  · You have low blood sugar often.  Get help right away if:  · You still have symptoms after you eat or drink something sugary.  · Your blood sugar is at or below 54 mg/dL (3 mmol/L).  · You have jerky movements that you cannot control.  · You pass out.  These symptoms may be an emergency. Do not wait to see if the symptoms will go away. Get medical help right away. Call your local emergency services (911 in the [place] Do not drive yourself to the hospital.   This information is not intended to replace advice given to you by your health care provider. Make sure you discuss any questions you have with your health care provider.  Document Released: 03/14/2011 Document Revised: 05/25/2017 Document Reviewed: 01/20/2017  [employer] Interactive Patient Education © 2017 [employer]          Depression / Suicide Risk    As you are discharged from this [facility] facility, it is important to learn how to keep safe from harming yourself.    Recognize the warning signs:  · Abrupt changes in personality, positive or  negative- including increase in energy   · Giving away possessions  · Change in eating patterns- significant weight changes-  positive or negative  · Change in sleeping patterns- unable to sleep or sleeping all the time   · Unwillingness or inability to communicate  · Depression  · Unusual sadness, discouragement and loneliness  · Talk of wanting to die  · Neglect of personal appearance   · Rebelliousness- reckless behavior  · Withdrawal from people/activities they love  · Confusion- inability to concentrate     If you or a loved one observes any of these behaviors or has concerns about self-harm, here's what you can do:  · Talk about it- your feelings and reasons for harming yourself  · Remove any means that you might use to hurt yourself (examples: pills, rope, extension cords, firearm)  · Get professional help from the community (Mental Health, Substance Abuse, psychological counseling)  · Do not be alone:Call your Safe Contact- someone whom you trust who will be there for you.  · Call your local CRISIS HOTLINE 611-6843 or 678-049-4213  · Call your local Children's Mobile Crisis Response Team [place] (553) 928-8631 or www.Mobile Tracing Services  · Call the toll free National Suicide Prevention Hotlines   · National Suicide Prevention Lifeline 021-458-ZIFG (8796)  · National Hope Line Network 800-SUICIDE (307-3370)

## 2019-04-07 NOTE — CARE PLAN
Problem: Safety  Goal: Will remain free from falls  Outcome: PROGRESSING AS EXPECTED  Risk for falls assessed, bed alarm on, bed locked and in lowest position, pt room near nurses station, pt rounded on q1hr, call light in place. Pt and family educated on fall risk precautions and need to call RN before getting up. Blood sugars being monitored Q 1hr

## 2019-04-07 NOTE — PROGRESS NOTES
· 2 RN admission skin check complete with CHARIS Saez.  · Devices in place: none.  · No s/sx breakdown. L side of tongue red and slightly swollen, pt states he may have bitten it, intact.  · The following interventions in place: pt repositions self, promote nutrition

## 2019-04-16 NOTE — DOCUMENTATION QUERY
[facility]                                                                       Query Response Note      PATIENT:               JO MUÑOZ  ACCT #:                  [id number]  MRN:                     [id number]  :                      1985  ADMIT DATE:       2019 4:26 AM  DISCH DATE:        2019 12:41 PM  RESPONDING  PROVIDER #:        [id number]           QUERY TEXT:    Depression is documented in the Medical Record.  Please specify the type.    NOTE:  If an appropriate response is not listed below, please respond with a new note.              The patient's Clinical Indicators include:  Patient has Major Depressive Disorder and is on Cymbalta.    Query created by: Bruna Luo on 2019 2:44 PM    RESPONSE TEXT:    Unable to determine          Electronically signed by:  MARY HER MD 2019 2:46 PM

## 2019-06-05 NOTE — PROGRESS NOTES
Assumed care at 1900, bedside report received from day shift RN. Pt. Is medical. Initial assessment completed, orders reviewed, call light within reach, and hourly rounding in place. POC addressed with patient, no additional questions at this time.    06:53

## 2020-01-21 ENCOUNTER — TELEPHONE (OUTPATIENT)
Dept: NEUROLOGY | Facility: MEDICAL CENTER | Age: 35
End: 2020-01-21

## 2020-01-21 NOTE — TELEPHONE ENCOUNTER
Pt brought a DMV form to get cleared to get his drivers license. Pt was last seen 2/2019. Pt was advised to make appt since DMV requires for pt to be seen within 30 days. Form is scanned in media.   Pt was called [facility] and also informed by our  as well.

## 2020-02-03 NOTE — ED TRIAGE NOTES
Paras Lamb  33 y.o.  Chief Complaint   Patient presents with   • ALOC   • Hypoglycemia     BIB EMS from [facility] for above.    Patient is a known Type-I Diabetic.    This evening he was witnessed to be altered and lethargic, tried to walk OOB and fell onto floor.    Found by EMS on floor with snoring respirations. Initial fingerstick 43. Arousable to tactile stimuli - PO glucose with orange juice given by EMS. Fingerstick recheck 41. IV D10 initiated by EMS and in progress upon arrival to ED.    Fingerstick upon arrival to ED 92.    Patient alert but drowsy. Answering questions appropriately.Conversing appropriately with staff.    States that he took about 10 units of insulin last night. Did not eat dinner or a snack after taking insulin.    D10 remains in progress from EMS.   PGY-1/Event Note

## 2020-03-20 ENCOUNTER — HOSPITAL ENCOUNTER (EMERGENCY)
Facility: MEDICAL CENTER | Age: 35
End: 2020-03-20
Attending: EMERGENCY MEDICINE

## 2020-03-20 VITALS
BODY MASS INDEX: 23.61 KG/M2 | WEIGHT: 164.9 LBS | DIASTOLIC BLOOD PRESSURE: 79 MMHG | RESPIRATION RATE: 14 BRPM | OXYGEN SATURATION: 96 % | HEART RATE: 88 BPM | SYSTOLIC BLOOD PRESSURE: 121 MMHG | TEMPERATURE: 98.1 F | HEIGHT: 70 IN

## 2020-03-20 DIAGNOSIS — S09.90XA CLOSED HEAD INJURY, INITIAL ENCOUNTER: ICD-10-CM

## 2020-03-20 DIAGNOSIS — F41.8 SITUATIONAL ANXIETY: ICD-10-CM

## 2020-03-20 PROCEDURE — 700111 HCHG RX REV CODE 636 W/ 250 OVERRIDE (IP): Performed by: EMERGENCY MEDICINE

## 2020-03-20 PROCEDURE — 99283 EMERGENCY DEPT VISIT LOW MDM: CPT

## 2020-03-20 PROCEDURE — A9270 NON-COVERED ITEM OR SERVICE: HCPCS | Performed by: EMERGENCY MEDICINE

## 2020-03-20 PROCEDURE — 700102 HCHG RX REV CODE 250 W/ 637 OVERRIDE(OP): Performed by: EMERGENCY MEDICINE

## 2020-03-20 RX ORDER — HYDROCODONE BITARTRATE AND ACETAMINOPHEN 5; 325 MG/1; MG/1
1 TABLET ORAL ONCE
Status: COMPLETED | OUTPATIENT
Start: 2020-03-20 | End: 2020-03-20

## 2020-03-20 RX ORDER — ONDANSETRON 4 MG/1
4 TABLET, ORALLY DISINTEGRATING ORAL ONCE
Status: COMPLETED | OUTPATIENT
Start: 2020-03-20 | End: 2020-03-20

## 2020-03-20 RX ADMIN — ONDANSETRON 4 MG: 4 TABLET, ORALLY DISINTEGRATING ORAL at 15:35

## 2020-03-20 RX ADMIN — HYDROCODONE BITARTRATE AND ACETAMINOPHEN 1 TABLET: 5; 325 TABLET ORAL at 15:35

## 2020-03-20 ASSESSMENT — ENCOUNTER SYMPTOMS
BLURRED VISION: 1
LOSS OF CONSCIOUSNESS: 0
HEADACHES: 1

## 2020-03-20 ASSESSMENT — FIBROSIS 4 INDEX: FIB4 SCORE: 0.5

## 2020-03-20 ASSESSMENT — LIFESTYLE VARIABLES: DO YOU DRINK ALCOHOL: NO
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
## 2020-03-20 NOTE — DISCHARGE INSTRUCTIONS
Rest, continue Tylenol or ibuprofen for pain.  Follow-up with your doctor.  Return for pain or other concerns

## 2020-03-20 NOTE — ED PROVIDER NOTES
ED Provider Note    Scribed for Chun Greer M.D. by Roxy Pretty. 3/20/2020, 3:28 PM.    Primary care provider: [facility] Nair  Means of arrival: Walk-in  History obtained from: Patient  History limited by: None    CHIEF COMPLAINT  Chief Complaint   Patient presents with   • T-5000 Head Injury   • Headache       HPI  Paras Lamb is a 34 y.o. male who presents to the Emergency Department for a head injury that happened a couple days ago. He has associated headache, light headedness, and blurred vision. He states that he hit the top of his head on a cabinet while cleaning. He denies any loss of consciousness after the injury. He also denies any cough, fever, chills, nausea, or vomiting. He is diabetic and takes insulin. He does not take blood thinners.    REVIEW OF SYSTEMS  Review of Systems   Eyes: Positive for blurred vision.   Neurological: Positive for headaches. Negative for loss of consciousness.        Positive for light-headedness       PAST MEDICAL HISTORY   has a past medical history of Anxiety, Depression, Diabetes, Hypertension, and Seizure (HCC).    SURGICAL HISTORY   has a past surgical history that includes thoracotomy (Right).    SOCIAL HISTORY  Social History     Tobacco Use   • Smoking status: Current Every Day Smoker     Packs/day: 0.25     Years: 17.00     Pack years: 4.25     Types: Cigarettes   • Smokeless tobacco: Never Used   Substance Use Topics   • Alcohol use: No     Comment: Stopped drinking ~ 2010   • Drug use: Not Currently      Social History     Substance and Sexual Activity   Drug Use Not Currently       FAMILY HISTORY  Family History   Problem Relation Age of Onset   • Seizures Mother    • No Known Problems Father    • Diabetes Maternal Grandfather        CURRENT MEDICATIONS  Home Medications     Reviewed by Mary Higgins R.N. (Registered Nurse) on 03/20/20 at 1500  Med List Status: Complete   Medication Last Dose Status   Insulin Glargine (BASAGLAR KWIKPEN) 100  "UNIT/ML Solution Pen-injector  Active                ALLERGIES  No Known Allergies    PHYSICAL EXAM  VITAL SIGNS: /79   Pulse 88   Temp 36.7 °C (98.1 °F) (Oral)   Resp 14   Ht 1.778 m (5' 10\")   Wt 74.8 kg (164 lb 14.5 oz)   SpO2 96%   BMI 23.66 kg/m²   Vitals reviewed.  Constitutional: Well developed, Well nourished, No acute distress, Non-toxic appearance.   HENT: Normocephalic, Normal TMs, Atraumatic, Bilateral external ears normal, Oropharynx moist, No oral exudates, Nose normal.   Eyes: PERRL, EOMI, Conjunctiva normal, No discharge.   Neck: Normal range of motion, No tenderness, Supple, No stridor.   Cardiovascular: Normal heart rate, Normal rhythm, No murmurs, No rubs, No gallops.   Thorax & Lungs: Normal breath sounds, No respiratory distress, No wheezing,  Musculoskeletal: Good range of motion in all major joints  Neurologic: Alert,No focal deficits noted.   Psychiatric: Affect normal    COURSE & MEDICAL DECISION MAKING  Pertinent Labs & Imaging studies reviewed. (See chart for details)    3:28 PM Patient seen and examined at bedside. The patient presents with headache, and the differential diagnosis includes but is not limited to close head injury, contusion, concussion. Patient will be treated with Norco 5-325 mg and Zofran 4 mg to treat his symptoms. I informed the patient that his symptoms are likely due to a mild concussion and that a CT scan is not necessary.     \The patient does not meet criteria for CT per the [place] head CT rules.  He has a headache but is very nonspecific non-concerning mechanism.    Patient is given return precautions and discharged in good condition.    Patient does not require any further work-up and treatment.  His questions are answered is agreeable to plan he is discharged in good condition.    The patient will return for new or worsening symptoms and is stable at the time of discharge.    DISPOSITION:  Patient will be discharged home in stable " condition.    FOLLOW UP:  No follow-up provider specified.    OUTPATIENT MEDICATIONS:  New Prescriptions    No medications on file         FINAL IMPRESSION  1. Closed head injury, initial encounter    2. Situational anxiety          I, Roxy Pretty (Scribe), am scribing for, and in the presence of, Chun Greer M.D..    Electronically signed by: Roxy Pretty (Scribe), 3/20/2020    IChun M.D. personally performed the services described in this documentation, as scribed by Roxy Pretty in my presence, and it is both accurate and complete.  E  The note accurately reflects work and decisions made by me.  Chun Greer M.D.  3/20/2020  4:44 PM

## 2020-04-09 NOTE — PROGRESS NOTES
No chief complaint on file.      Problem List Items Addressed This Visit     None          THIS CONSULTATION WAS PERFORMED VIA TELEMEDICINE, UTILIZING AN ENCRYPTED TRANSMISSION. THE PATIENT CONSENTED TO THIS CONSULTATION.    History of present illness:  Paras Lamb 34 y.o. male presents today for ***      Seizure onset: ***    Seizure semiology: ***    Seizure types: ***    Last seizure: ***    Past ASM’s: ***    Current ASM regimen: ***    Prior neurologists: ***    Prior EEG’s: ***    Prior brain imaging: ***    Driving status: ***    School/work status: ***      Past medical history:   Past Medical History:   Diagnosis Date   • Anxiety    • Depression    • Diabetes    • Hypertension    • Seizure (HCC)        Past surgical history:   Past Surgical History:   Procedure Laterality Date   • THORACOTOMY Right        Family history:   Family History   Problem Relation Age of Onset   • Seizures Mother    • No Known Problems Father    • Diabetes Maternal Grandfather        Social history:   Social History     Socioeconomic History   • Marital status: Single     Spouse name: Not on file   • Number of children: Not on file   • Years of education: Not on file   • Highest education level: Not on file   Occupational History   • Not on file   Social Needs   • Financial resource strain: Not on file   • Food insecurity     Worry: Not on file     Inability: Not on file   • Transportation needs     Medical: Not on file     Non-medical: Not on file   Tobacco Use   • Smoking status: Current Every Day Smoker     Packs/day: 0.25     Years: 17.00     Pack years: 4.25     Types: Cigarettes   • Smokeless tobacco: Never Used   Substance and Sexual Activity   • Alcohol use: No     Comment: Stopped drinking ~ 2010   • Drug use: Not Currently   • Sexual activity: Not on file   Lifestyle   • Physical activity     Days per week: Not on file     Minutes per session: Not on file   • Stress: Not on file   Relationships   • Social connections      Talks on phone: Not on file     Gets together: Not on file     Attends Yazdanism service: Not on file     Active member of club or organization: Not on file     Attends meetings of clubs or organizations: Not on file     Relationship status: Not on file   • Intimate partner violence     Fear of current or ex partner: Not on file     Emotionally abused: Not on file     Physically abused: Not on file     Forced sexual activity: Not on file   Other Topics Concern   • Not on file   Social History Narrative   • Not on file       Current medications:   Current Outpatient Medications   Medication   • Insulin Glargine (BASAGLAR KWIKPEN) 100 UNIT/ML Solution Pen-injector     No current facility-administered medications for this visit.        Medication Allergy:  No Known Allergies      Review of systems:     General: Denies fevers or chills, or nightsweats, or generalized fatigue.    Head: Denies headaches or dizziness or lightheadedness  EENT: Denies vision changes, vision loss or pain, nasal secretion, nasal bleeding, difficulty swallowing, hearing loss, tinnitus, vertigo, ear pain  Respiratory: Denies shortness of breath, cough, sputum, or wheezing  Cardiac: Denies chest pain, palpitations, edema or syncope  Gastrointestinal: Denies nausea, vomiting, no abdominal pain or change in bowel habits, no melena or hematochezia  Urinary: Denies dysuria, frequency, hesitancy, or incontinence.  Dermatologic:  Denies new rash  Musculoskeletal: Denies muscle pain or swelling, no atrophy, no neck and back pain or stiffness.   Neurologic: Denies facial droopiness, muscle weakness (focal or generalized), paresthesias, ataxia, change in speech or language, memory loss, abnormal movements, seizures, loss of consciousness, or episodes of confusion.   Psychiatric: Denies anxiety, depression, mood swings, suicidal or homicidal thoughts       Physical examination:   General: Patient in no acute distress, pleasant and cooperative.  HEENT:  Normocephalic, no signs of acute trauma.   Neck: There is normal range of motion.   Skin: no signs of acute rashes or trauma in visible areas  Psychiatric: No hallucinatory behavior. No symptoms of depression or suicidal ideation. Mood and affect appear normal on exam.      NEUROLOGICAL EXAM:   Mental status, orientation: Awake, alert and fully oriented.   Speech and language: speech is clear and fluent. The patient is able to name, repeat and comprehend.   Memory: There is intact recollection of recent and remote events.   Cranial nerve exam:   CN I: Not examined   CN II: Unable to assess  CN III, IV, VI: EOMI  CN V: Unable to assess  CN VII: face symmetric   CN VIII: Unable to assess  CN IX, X: Unable to assess  CN XI: Symmetric shoulder shrug  CN XII: tongue midline.   Motor exam: Appears to have equal strength in all extremities. No abnormal movements were seen on exam.   Sensory exam Unable to assess  Deep tendon reflexes: Unable to assess  Coordination: movements were fluid, no dysmetria  Gait: The patient was able to get up from seated position on first attempt without requiring assistance. Found to be steady when walking. Movements were fluid with normal arm swing. The patient was able to turn without difficulties or tendency to fall. Romberg exam ***      ANCILLARY DATA REVIEWED:       Lab Data Review:  Reviewed in chart. No results found for this or any previous visit (from the past 24 hour(s)).      Records reviewed:   Reviewed in chart.    Imaging:       EEG:          ASSESSMENT AND PLAN:    There are no diagnoses linked to this encounter.        CLINICAL DISCUSSION:          FOLLOW-UP:   No follow-ups on file.      EDUCATION AND COUNSELING:  -Education was provided to the patient and/or family regarding diagnosis and prognosis. The chronic and unpredictable nature of the condition were discussed. There is increased risk for additional events, which may carry potential for significant injuries and death.  Discussed frequent seizure triggers: sleep deprivation, medication non-compliance, use of illegal drugs/alcohol, stress, and others.   -We reviewed in detail the current antiepileptic regimen. Potential side effects of antiepileptics were discussed at length, including but no limited to: hypersensitivity reactions (rash and others, some of which can be fatal), visual field changes (some of which may be irreversible), glaucoma, diplopia, kidney stones, osteopenia/osteoporosis/bone fractures, hyperthermia/anhydrosis, hyponatremia, tremors/abnormal movements, ataxia, dizziness, fatigue, increased risk for falls, risk for cardiac arrhythmias/syncope, gastrointestinal side effects(hepatitis, pancreatitis, gastritis, ulcers), gingival hypertrophy/bleeding, drowsiness, sedation, anxiety/nervousness, increased risk for suicide, increased risk for depression, and psychosis.   -We also reviewed drug-drug interactions and their potential effect on seizure control and medication side effects.    -We also discussed in detail potential effects of seizures, epilepsy, and medications during pregnancy, including but not limited to fetal malformations, child developmental/intellectual disability, fetal/ risk for hemorrhages, stillbirth, maternal death, premature birth, and others. The patient/family aware that pregnancy should be avoided, unless desired, in which case we recommend discussing with us at least a year prior to planned conception. To avoid undesired pregnancy while on antiepileptics, we recommend dual contraception.   -Folic acid 2 mg is recommended for all females in childbearing age (12-44 years of age).   -Recommend chronic vitamin D supplementation and regular exercise (if not contraindicated).   -Patient/family educated on risk for SUDEP (Sudden Death in Epilepsy). Counseling was provided on the importance of strict medication and follow up compliance. The patient/family understand the risks associated with  non-adherence with the medical plan as outlined, including but not limited to an increased risk for breakthrough seizures, which may contribute to injuries, disability, status epilepticus, and even death.   -Counseling was also provided on potential effects of alcohol and other drugs, which may lower seizure threshold and/or affect the metabolism of antiepileptic drugs. We recommend avoidance of alcohol and illegal drugs.  -Avoid sleep deprivation.   -We extensively discussed the aspects related to safety in drivers who suffer from epilepsy. The patient is encourage to report to the Division of Motor Vehicles of any condition and/or spells related to confusion, disorientation, and/or loss of awareness and/or loss of consciousness; as these may pose a safety issue if they occur while operating a motor vehicle. The patient and/or family are ultimately responsible for exercising caution and abiding to regulations in place.   -Other seizure precautions were discussed at length, including no diving, no skydiving, no climbing or exposure to unprotected heights, no unsupervised swimming, no Jacuzzi or bathing in bathtubs or deep bodies of water. The patient/family have been advised about risks for operating any machinery while suffering from seizures / syncope / epilepsy and/or while taking antiepileptic drugs.   -The patient understands and agrees that due to the complexity of his/her diagnosis, results of any testing and further recommendations will typically be discussed/made during a face to face encounter in my office. The patient and/or family further understands it is their responsibility to keep proper follow up.     Patient/family agree with plan, as outlined.         Nasrin Webster, MSN, APRN, FNP-C  [facility]  Office: [phone]  Fax: [phone]        BILLING DOCUMENTATION:      Counseling: I spent greater than 50% time face-to-face time (via telemedicine) of a total of *** minutes  visit. Over 50% of the time of the visit today was spent on counseling and or coordination of care wtih the patient and/or family, with greater than 50% of the total discussing my assessment and plan as stated above.

## 2020-04-10 ENCOUNTER — APPOINTMENT (OUTPATIENT)
Dept: NEUROLOGY | Facility: MEDICAL CENTER | Age: 35
End: 2020-04-10

## 2020-04-22 NOTE — PROGRESS NOTES
Chief Complaint   Patient presents with   • Follow-Up     no seizures       Problem List Items Addressed This Visit     None      Visit Diagnoses     Seizure (HCC)        Screening for depression        Encounter for examination for driving license              THIS CONSULTATION WAS PERFORMED VIA TELEMEDICINE, UTILIZING AN ENCRYPTED TRANSMISSION TO PREVENT SPREAD OF COVID19. THE PATIENT CONSENTED TO THIS CONSULTATION.    History of present illness:  Paras Lamb 34 y.o. male is f/u for DMV paperwork. He sees  here in clinic. She is currently out on maternity leave. He was last seen by her on 2/28/2019.    Pt reports that he has not had any spells since before she saw Dr. Vail. He states his last one was probably in Jan 2019 but he believes it was related to his hypoglycemia as he has diabetes. He is currently not taking any ASM. He forgot to f/u with Dr. Vail and was not able to follow through with the work-up recommended by her. He states he was told that he didn't need them anymore. He will call to schedule his MRI brain and EEG.     Mood is good.He denies any depression. Not suicidal or homicidal.     He is not driving yet but wants to drive and take the 's exam.           Past medical history:   Past Medical History:   Diagnosis Date   • Anxiety    • Depression    • Diabetes    • Hypertension    • Seizure (HCC)        Past surgical history:   Past Surgical History:   Procedure Laterality Date   • THORACOTOMY Right        Family history:   Family History   Problem Relation Age of Onset   • Seizures Mother    • No Known Problems Father    • Diabetes Maternal Grandfather        Social history:   Social History     Socioeconomic History   • Marital status: Single     Spouse name: Not on file   • Number of children: Not on file   • Years of education: Not on file   • Highest education level: Not on file   Occupational History   • Not on file   Social Needs   • Financial resource strain: Not on file   •  Food insecurity     Worry: Not on file     Inability: Not on file   • Transportation needs     Medical: Not on file     Non-medical: Not on file   Tobacco Use   • Smoking status: Current Every Day Smoker     Packs/day: 0.25     Years: 17.00     Pack years: 4.25     Types: Cigarettes   • Smokeless tobacco: Never Used   Substance and Sexual Activity   • Alcohol use: No     Comment: Stopped drinking ~ 2010   • Drug use: Not Currently   • Sexual activity: Not on file   Lifestyle   • Physical activity     Days per week: Not on file     Minutes per session: Not on file   • Stress: Not on file   Relationships   • Social connections     Talks on phone: Not on file     Gets together: Not on file     Attends Jehovah's witness service: Not on file     Active member of club or organization: Not on file     Attends meetings of clubs or organizations: Not on file     Relationship status: Not on file   • Intimate partner violence     Fear of current or ex partner: Not on file     Emotionally abused: Not on file     Physically abused: Not on file     Forced sexual activity: Not on file   Other Topics Concern   • Not on file   Social History Narrative   • Not on file       Current medications:   Current Outpatient Medications   Medication   • Insulin Glargine (BASAGLAR KWIKPEN) 100 UNIT/ML Solution Pen-injector     No current facility-administered medications for this visit.        Medication Allergy:  No Known Allergies      Review of systems:     General: Denies fevers or chills, or nightsweats, or generalized fatigue.    Head: Denies headaches or dizziness or lightheadedness  EENT: Denies vision changes, vision loss or pain, nasal secretion, nasal bleeding, difficulty swallowing, hearing loss, tinnitus, vertigo, ear pain  Respiratory: Denies shortness of breath, cough, sputum, or wheezing  Cardiac: Denies chest pain, palpitations, edema or syncope  Gastrointestinal: Denies nausea, vomiting, no abdominal pain or change in bowel habits, no  melena or hematochezia  Urinary: Denies dysuria, frequency, hesitancy, or incontinence.  Dermatologic:  Denies new rash  Musculoskeletal: Denies muscle pain or swelling, no atrophy, no neck and back pain or stiffness.   Neurologic: Denies facial droopiness, muscle weakness (focal or generalized), paresthesias, ataxia, change in speech or language, memory loss, abnormal movements, seizures, loss of consciousness, or episodes of confusion.   Psychiatric: Denies anxiety, depression, mood swings, suicidal or homicidal thoughts       Physical examination:   General: Patient in no acute distress, pleasant and cooperative.  HEENT: Normocephalic, no signs of acute trauma.   Neck: There is normal range of motion.   Skin: no signs of acute rashes or trauma in visible areas  Psychiatric: No hallucinatory behavior. No symptoms of depression or suicidal ideation. Mood and affect appear normal on exam.      NEUROLOGICAL EXAM:   Mental status, orientation: Awake, alert and fully oriented.   Speech and language: speech is clear and fluent. The patient is able to name, repeat and comprehend.   Memory: There is intact recollection of recent and remote events.   Cranial nerve exam:   CN I: Not examined   CN II: Unable to assess  CN III, IV, VI: EOMI  CN V: Unable to assess  CN VII: face symmetric   CN VIII: Unable to assess  CN IX, X: Unable to assess  CN XI: Symmetric shoulder shrug  CN XII: tongue midline.   Motor exam: Appears to have equal strength in all extremities. No abnormal movements were seen on exam.   Sensory exam Unable to assess  Deep tendon reflexes: Unable to assess  Coordination: movements were fluid, no dysmetria  Gait: The patient was able to get up from seated position on first attempt without requiring assistance. Found to be steady when walking. Movements were fluid with normal arm swing. The patient was able to turn without difficulties or tendency to fall. Romberg exam unremarkable      ANCILLARY DATA  REVIEWED:       Lab Data Review:  Reviewed in chart. BMP, CBC    Records reviewed:   Reviewed in chart.    Imaging:   CT head 2016  IMPRESSION:     No acute intracranial abnormality is identified.     3.1 x 2.9 cm left middle cranial fossa arachnoid cyst.     Prominent adenoids.    EEG:  Routine EEG 2016  INTERPRETATION:  This EEG is not remarkable, except patient is extremely   sleepy during this whole routine EEG recording.  Of note, there are no   epileptiform discharges.        ASSESSMENT AND PLAN:    1. Seizure (HCC)    2. Screening for depression    3. Encounter for examination for driving license            CLINICAL DISCUSSION:  Pt with history of seizures. He is f/u with Dr. Vail for this and is not on ASM currently. His last spell was in January 2019 and he believes it was related to hypoglycemia. He did not schedule his MRI brain and EEG and did not f/u with Dr. Vail as recommended. He wants to get his 's license and wants to be cleared by neurology. I recommended to schedule his diagnostic work-up and f/u with Dr. Vail in a timely manner. Since he has not had any spell or seizure for more than 3 months, he may start the process of getting his license and I will fill out DMV paperwork for him. He is aware to stop driving if he has another spell with impaired awareness or LOC. Pt wants to continue with no ASM.         FOLLOW-UP:   Return in about 6 weeks (around 6/5/2020), or or afte EEG.      EDUCATION AND COUNSELING:  -Education was provided to the patient and/or family regarding diagnosis and prognosis. The chronic and unpredictable nature of the condition were discussed. There is increased risk for additional events, which may carry potential for significant injuries and death. Discussed frequent seizure triggers: sleep deprivation, medication non-compliance, use of illegal drugs/alcohol, stress, and others.   -Recommend chronic vitamin D supplementation and regular exercise (if not contraindicated).    -Patient/family educated on risk for SUDEP (Sudden Death in Epilepsy). Counseling was provided on the importance of strict medication and follow up compliance. The patient/family understand the risks associated with non-adherence with the medical plan as outlined, including but not limited to an increased risk for breakthrough seizures, which may contribute to injuries, disability, status epilepticus, and even death.   - We recommend avoidance of alcohol and illegal drugs.  -Avoid sleep deprivation.   -We extensively discussed the aspects related to safety in drivers who suffer from epilepsy. The patient is encourage to report to the Division of Motor Vehicles of any condition and/or spells related to confusion, disorientation, and/or loss of awareness and/or loss of consciousness; as these may pose a safety issue if they occur while operating a motor vehicle. The patient and/or family are ultimately responsible for exercising caution and abiding to regulations in place.   -Other seizure precautions were discussed at length, including no diving, no skydiving, no climbing or exposure to unprotected heights, no unsupervised swimming, no Jacuzzi or bathing in bathtubs or deep bodies of water. The patient/family have been advised about risks for operating any machinery while suffering from seizures / syncope / epilepsy and/or while taking antiepileptic drugs.   -The patient understands and agrees that due to the complexity of his/her diagnosis, results of any testing and further recommendations will typically be discussed/made during a face to face encounter in my office. The patient and/or family further understands it is their responsibility to keep proper follow up.     Patient/family agree with plan, as outlined.         Nasrin Webster, MSN, APRN, FNP-C  [facility]  Office: [phone]  Fax: [phone]    This encounter was conducted via Zoom .   Verbal consent was obtained. Patient's  identity was verified.

## 2020-04-24 ENCOUNTER — TELEMEDICINE (OUTPATIENT)
Dept: NEUROLOGY | Facility: MEDICAL CENTER | Age: 35
End: 2020-04-24

## 2020-04-24 VITALS — BODY MASS INDEX: 25.48 KG/M2 | WEIGHT: 178 LBS | HEIGHT: 70 IN

## 2020-04-24 DIAGNOSIS — Z02.4 ENCOUNTER FOR EXAMINATION FOR DRIVING LICENSE: ICD-10-CM

## 2020-04-24 DIAGNOSIS — Z13.31 SCREENING FOR DEPRESSION: ICD-10-CM

## 2020-04-24 DIAGNOSIS — R56.9 SEIZURE (HCC): ICD-10-CM

## 2020-04-24 PROCEDURE — 99213 OFFICE O/P EST LOW 20 MIN: CPT | Mod: 95,CR | Performed by: NURSE PRACTITIONER

## 2020-04-24 ASSESSMENT — FIBROSIS 4 INDEX: FIB4 SCORE: 0.5

## 2020-09-23 NOTE — PROGRESS NOTES
Pt transported from ER to ICU with ACLS RN.  Pt chart and belongings at bedside. Vitals up to date.    170.2

## 2020-10-02 ENCOUNTER — APPOINTMENT (OUTPATIENT)
Dept: NEUROLOGY | Facility: MEDICAL CENTER | Age: 35
End: 2020-10-02

## 2021-03-29 ENCOUNTER — OFFICE VISIT (OUTPATIENT)
Dept: NEUROLOGY | Facility: MEDICAL CENTER | Age: 36
End: 2021-03-29
Attending: PSYCHIATRY & NEUROLOGY
Payer: MEDICAID

## 2021-03-29 VITALS
SYSTOLIC BLOOD PRESSURE: 105 MMHG | OXYGEN SATURATION: 100 % | BODY MASS INDEX: 24.05 KG/M2 | DIASTOLIC BLOOD PRESSURE: 69 MMHG | WEIGHT: 168 LBS | HEART RATE: 90 BPM | RESPIRATION RATE: 16 BRPM | HEIGHT: 70 IN | TEMPERATURE: 98 F

## 2021-03-29 DIAGNOSIS — R40.4 NONSPECIFIC PAROXYSMAL SPELL: ICD-10-CM

## 2021-03-29 PROCEDURE — 99214 OFFICE O/P EST MOD 30 MIN: CPT | Performed by: PSYCHIATRY & NEUROLOGY

## 2021-03-29 PROCEDURE — 99211 OFF/OP EST MAY X REQ PHY/QHP: CPT | Performed by: PSYCHIATRY & NEUROLOGY

## 2021-03-29 ASSESSMENT — FIBROSIS 4 INDEX: FIB4 SCORE: 0.51

## 2021-03-29 NOTE — PROGRESS NOTES
Chief Complaint   Patient presents with   • Follow-Up     Seizure      History of present illness:  Paras Lamb 33 y.o. male presents today for seizures f/u.   History is obtained from patient.  and Patient is accompanied by self    Duration/timing: onset age 13, 2/11/19 most recent episode, prior to that last episode 2010 (when he was drinking heavily).  Context: Seizures; when he was in 13 years old he fell asleep in class and had a seizure (described as GTC). +DM1 at age 26. Most recent seizure - He is in [employer] program - and he medication management was confused and he took too much insulin with blood glucose 29 - he was found in kitchen passed out and unresponsive and witnessed seizure with tongue biting followed by another seizure (likely GTC). Hx of heavy EtOH use daily with witnessed seizures within 24 hours. Mom had seizures since childhood. Does not drive - does not have license. Never been on seizure meds.   Location: brain  Quality: shaking   Severity: moderate to severe  Modifying factors: worse with EtOH and hypoglycemia  Associated signs/symptoms: post episode confusion  Denies: bladder incontinence, bowel incontinence, headaches, vision changes, head trauma , weakness, other numbness/tingling, swallowing difficulties, speech disturbance, hearing loss, hallucinations and aura, hx of withdrawals with alcohol     Subjective: Patient was last seen in neurology clinic in 2019     Spells: Patient returns today because he does not believe he is having seizures.  Reviewed the history as documented above and states that he was not shaking all over when he was 13 and had an episode in class.  States that with episodes of hypoglycemia he was awake and having tremors instead of shaking.  He denies having any episodes as documented above.  He did have some spells with heavy alcohol use and again denies having any episodes concerning for seizure.  His mom does have a history of seizures as a child.   "Since he has been well controlled with his diabetes and compliant with medication.  He has been abstinent from alcohol.  Mostly he is concerned today about how this diagnosis will affect his ability to drive.  He denies any paroxysmal spells of anxiety, focal numbness, abnormal movements, incontinence or tongue biting.  He states the MRI and EEG were denied by Medicaid.    He is following PCP for diabetes. Last episode 2/11/2019. He is working at [employer].  He is regained full custody of his son.      Past medical history:   Past Medical History:   Diagnosis Date   • Anxiety    • Depression    • Diabetes    • Hypertension    • Seizure (HCC)        Past surgical history:   Past Surgical History:   Procedure Laterality Date   • THORACOTOMY Right        Family history:   Family History   Problem Relation Age of Onset   • Seizures Mother    • No Known Problems Father    • Diabetes Maternal Grandfather        Social history:   Abstinent from alcohol since 2010    Current medications:   Current Outpatient Medications   Medication   • Insulin Glargine (BASAGLAR KWIKPEN) 100 UNIT/ML Solution Pen-injector     No current facility-administered medications for this visit.       Medication Allergy:  No Known Allergies    Review of Systems   Neurological:        As per HPI       Physical examination:   Vitals:    03/29/21 0940   BP: 105/69   BP Location: Left arm   Patient Position: Sitting   BP Cuff Size: Adult   Pulse: 90   Resp: 16   Temp: 36.7 °C (98 °F)   TempSrc: Temporal   SpO2: 100%   Weight: 76.2 kg (168 lb)   Height: 1.778 m (5' 10\")     General: Patient in well nourished in no apparent distress.  HENT: Normocephalic, atraumatic  Cardiovascular: No lower extremity edema.  Respiratory: Normal respiratory effort.   Skin: No appreciable signs of acute rashes or bruising.   Musculoskeletal: No signs of joint or muscle swelling.   Psychiatric: Pleasant.     NEUROLOGICAL EXAM:   Mental status: Awake, alert and fully " oriented to person, place, time and situation. Normal attention, concentration and fund of knowledge for education level.   Speech and language: Speech is fluent without errors.  Cranial nerve exam:  II: Pupils are equally round and reactive to light. Visual fields are intact by confrontation.  III, IV, VI: EOMI, no diplopia, no ptosis.  V: Sensation to light touch is normal over V1-3 distributions bilaterally.  .  VII: Facial movements are symmetrical. There is no facial droop. .  VIII: Hearing intact to soft speech   IX: Dysarthria is not present.  XI: Shoulder shrug are symmetrical and strong.   XII: Tongue protrudes midline.    Motor exam:  Muscle tone is normal in all 4 limbs. and No abnormal movements appreciated.    Muscle strength:     Right  Left  Deltoid   5/5  5/5      Biceps   5/5  5/5  Triceps  5/5  5/5   Wrist extensors 5/5  5/5  Wrist flexors  5/5  5/5     5/5  5/5  Interossei  5/5  5/5  Thenar (APB)  NT/5  NT/5   Hip flexors  5/5  5/5  Quadriceps  5/5  5/5    Hamstrings  5/5  5/5  Dorsiflexors  5/5  5/5  Plantarflexors  5/5  5/5  Toe extension  NT/5  NT/5  NT = not tested    Sensory exam:  Intact to Light touch in bilateral upper and lower extremity.    Deep tendon reflexes:       Right  Left  Biceps   1/4  1/4  Triceps  1/4  1/4  Brachioradialis 1/4  1/4  Knee jerk  0/4  0/4  Ankle jerk  0/4  0/4   bilateral toes are downgoing to plantar stimulation..    Coordination: shows a normal finger-nose-finger and heel to shin bilaterally.   Gait: Casual gait is normal.      ANCILLARY DATA REVIEWED:   Lab Data Review:  Lab Results   Component Value Date/Time    WBC 6.4 04/07/2019 04:45 AM    RBC 5.18 04/07/2019 04:45 AM    HEMOGLOBIN 15.3 04/07/2019 04:45 AM    HEMATOCRIT 46.4 04/07/2019 04:45 AM    MCV 89.6 04/07/2019 04:45 AM    MCH 29.5 04/07/2019 04:45 AM    MCHC 33.0 (L) 04/07/2019 04:45 AM    MPV 10.6 04/07/2019 04:45 AM    NEUTSPOLYS 58.90 04/07/2019 04:45 AM    LYMPHOCYTES 26.60 04/07/2019 04:45  "AM    MONOCYTES 9.20 04/07/2019 04:45 AM    EOSINOPHILS 4.40 04/07/2019 04:45 AM    BASOPHILS 0.60 04/07/2019 04:45 AM      Lab Results   Component Value Date/Time    SODIUM 137 04/07/2019 04:45 AM    POTASSIUM 4.0 04/07/2019 04:45 AM    CHLORIDE 108 04/07/2019 04:45 AM    CO2 25 04/07/2019 04:45 AM    GLUCOSE 272 (H) 04/07/2019 04:45 AM    BUN 8 04/07/2019 04:45 AM    CREATININE 0.79 04/07/2019 04:45 AM       Lab Results  Component Value Date/Time   ASTSGOT 7 (L) 11/24/2018 0308   ALTSGPT 13 11/24/2018 0308   ALKPHOSPHAT 72 11/24/2018 0308   ALBUMIN 3.0 (L) 11/24/2018 0308     Lab Results   Component Value Date/Time    HBA1C 15.9 (H) 11/24/2018 03:08 AM      EEG performed April 2016 performed for dizziness and lightheadedness before loss of consciousness after taking long acting insulin and forgetting to eat breakfast: Limited EEG due to persistent drowsiness and limited awake times.  EEG interpretation is \"not remarkable, no epileptiform.\"    Imaging: CT head without contrast March 2016 performed for headache: No acute intracranial abnormality is identified. 3.1 x 2.9 cm left middle cranial fossa arachnoid cyst. Prominent adenoids.    Records reviewed:   Patient was seen by DIANA Hills in 2020 while was on maternity leave via telemedicine.  He was here for DMV paperwork.  At that time last spell was in January 2019 and believes it was related to his hypoglycemia.  Did not obtain his MRI brain and EEG.    Patient was in the emergency department in March 2020 for head injury associate with headache.    ASSESSMENT AND PLAN:      1. Nonspecific paroxysmal spell: Patient with a history of paroxysmal spells in the setting of hypoglycemia/type 1 diabetes and history of heavy alcohol use.  On initial visit they were described as generalized tonic-clonic episodes per documentation however a different history is provided today.  There is a family history of seizures in mom.  He has since been controlled with " diabetes and abstinent from alcohol.  He had an EEG in 2016 while obtunded without epileptiform activity.  Prior CT head with medial cranial fossa arachnoid cyst.  Unfortunately patient did not pursue further work-up for evaluation of seizure risk.  Given the conflicting history I think it reasonable to obtain an MRI of the brain seizure protocol and EEG to evaluate for seizure risk/documentation.  There have been no episodes since 2019 and he has been driving without issue.  -DMV form will be faxed with information provided  -MRI brain with and without seizure protocol  -EEG with video  -BMP  -We discussed thoroughly indications for further work-up, medical reasoning as above, concerns regarding the conflicting history, driving precautions and risks    2. Type 1 diabetes mellitus with diabetic polyneuropathy (HCC): Patient was on gabapentin previously now off    3. Depression, unspecified depression type: Resolved per patient today, he is doing very well    FOLLOW-UP: Return for After work-up.  EDUCATION AND COUNSELING:  -I personally discussed the following with the patient:   I have made the patient aware of mandatory reporting required by the law in the [facility] regarding episodes of seizures, loss of consciousness, alteration of awareness, and/or concerns for driving safety as discussed today. The patient and family are responsible for reporting events to the DMV, instructions were provided. The patient verbalized understanding.      Total time spent on day of visit: 30 min  Additional time was spent: reviewing diagnostic workup to date, reviewing/obtaining separately obtained history, counseling and educating the patient/family/caregiver and ordering medications, tests, or procedures    This dictation was created using voice recognition software. I have made every reasonable attempt to avoid dictation errors, but this document may contain an error not identified before finalizing. If the error changes  the accuracy of the document, I would appreciate it being brought to my attention. Thank you very much.     Roxy Vail MD  Neurology, Neurophysiology  [facility]

## 2021-04-11 ENCOUNTER — HOSPITAL ENCOUNTER (EMERGENCY)
Facility: MEDICAL CENTER | Age: 36
End: 2021-04-11
Attending: EMERGENCY MEDICINE
Payer: MEDICAID

## 2021-04-11 VITALS
OXYGEN SATURATION: 99 % | DIASTOLIC BLOOD PRESSURE: 74 MMHG | WEIGHT: 165 LBS | SYSTOLIC BLOOD PRESSURE: 133 MMHG | RESPIRATION RATE: 16 BRPM | TEMPERATURE: 97.4 F | HEART RATE: 95 BPM | HEIGHT: 70 IN | BODY MASS INDEX: 23.62 KG/M2

## 2021-04-11 DIAGNOSIS — K02.9 PAIN DUE TO DENTAL CARIES: ICD-10-CM

## 2021-04-11 PROCEDURE — 99283 EMERGENCY DEPT VISIT LOW MDM: CPT

## 2021-04-11 PROCEDURE — 700102 HCHG RX REV CODE 250 W/ 637 OVERRIDE(OP): Performed by: EMERGENCY MEDICINE

## 2021-04-11 PROCEDURE — A9270 NON-COVERED ITEM OR SERVICE: HCPCS | Performed by: EMERGENCY MEDICINE

## 2021-04-11 RX ORDER — HYDROCODONE BITARTRATE AND ACETAMINOPHEN 5; 325 MG/1; MG/1
1 TABLET ORAL ONCE
Status: COMPLETED | OUTPATIENT
Start: 2021-04-11 | End: 2021-04-11

## 2021-04-11 RX ORDER — NAPROXEN 500 MG/1
500 TABLET ORAL 2 TIMES DAILY WITH MEALS
Qty: 30 TABLET | Refills: 0 | Status: SHIPPED | OUTPATIENT
Start: 2021-04-11

## 2021-04-11 RX ORDER — AMOXICILLIN 500 MG/1
500 CAPSULE ORAL ONCE
Status: COMPLETED | OUTPATIENT
Start: 2021-04-11 | End: 2021-04-11

## 2021-04-11 RX ORDER — AMOXICILLIN 500 MG/1
500 CAPSULE ORAL 3 TIMES DAILY
Qty: 21 CAPSULE | Refills: 0 | Status: SHIPPED | OUTPATIENT
Start: 2021-04-11 | End: 2021-04-18

## 2021-04-11 RX ADMIN — HYDROCODONE BITARTRATE AND ACETAMINOPHEN 1 TABLET: 5; 325 TABLET ORAL at 14:06

## 2021-04-11 RX ADMIN — AMOXICILLIN 500 MG: 500 CAPSULE ORAL at 14:06

## 2021-04-11 ASSESSMENT — LIFESTYLE VARIABLES
EVER HAD A DRINK FIRST THING IN THE MORNING TO STEADY YOUR NERVES TO GET RID OF A HANGOVER: NO
HAVE PEOPLE ANNOYED YOU BY CRITICIZING YOUR DRINKING: NO
TOTAL SCORE: 0
ON A TYPICAL DAY WHEN YOU DRINK ALCOHOL HOW MANY DRINKS DO YOU HAVE: 0
HOW MANY TIMES IN THE PAST YEAR HAVE YOU HAD 5 OR MORE DRINKS IN A DAY: 0
DO YOU DRINK ALCOHOL: NO
TOTAL SCORE: 0
HAVE YOU EVER FELT YOU SHOULD CUT DOWN ON YOUR DRINKING: NO
EVER FELT BAD OR GUILTY ABOUT YOUR DRINKING: NO
TOTAL SCORE: 0
CONSUMPTION TOTAL: NEGATIVE
AVERAGE NUMBER OF DAYS PER WEEK YOU HAVE A DRINK CONTAINING ALCOHOL: 0

## 2021-04-11 ASSESSMENT — PAIN DESCRIPTION - PAIN TYPE: TYPE: ACUTE PAIN

## 2021-04-11 NOTE — ED PROVIDER NOTES
"ED Provider Note    ED Provider Note    Primary care provider: MAVIS Nair  Means of arrival: Walk-in  History obtained from: Patient    CHIEF COMPLAINT  Chief Complaint   Patient presents with   • Dental Pain     Seen at 1:59 PM.   HPI  Paras Lamb is a 35 y.o. male who presents to the Emergency Department with 2 days of left upper dental pain.  The pain is worse with eating and chewing.  He has been taking ibuprofen with minimal improvement of the pain.  He denies any fevers, chills, nausea or vomiting.  He has a follow-up with [facility] 4[facility]20.  He states that his sugars have been well controlled lately.    REVIEW OF SYSTEMS  See HPI,   Remainder of ROS negative.     PAST MEDICAL HISTORY   has a past medical history of Anxiety, Depression, Diabetes, Hypertension, and Seizure (HCC).    SURGICAL HISTORY   has a past surgical history that includes thoracotomy (Right).    SOCIAL HISTORY  Social History     Tobacco Use   • Smoking status: Current Every Day Smoker     Packs/day: 0.25     Years: 17.00     Pack years: 4.25     Types: Cigarettes   • Smokeless tobacco: Never Used   Substance Use Topics   • Alcohol use: No     Comment: Stopped drinking ~ 2010   • Drug use: Not Currently      Social History     Substance and Sexual Activity   Drug Use Not Currently       FAMILY HISTORY  Family History   Problem Relation Age of Onset   • Seizures Mother    • No Known Problems Father    • Diabetes Maternal Grandfather        CURRENT MEDICATIONS  Reviewed.  See Encounter Summary.     ALLERGIES  No Known Allergies    PHYSICAL EXAM  VITAL SIGNS: /74   Pulse (!) 108   Temp 36.3 °C (97.4 °F) (Temporal)   Resp 18   Ht 1.778 m (5' 10\")   Wt 74.8 kg (165 lb)   SpO2 95%   BMI 23.68 kg/m²   Constitutional: Awake, alert in no apparent distress.  HENT: Normocephalic, Bilateral external ears normal. Nose normal.  Normal speech, no trismus, the floor the oropharynx is normal.  No facial swelling.  The left " maxillary teeth have a few scattered caries at the gingival line which are tender to palpation, no periapical abscesses.  Eyes: Conjunctiva normal, non-icteric, EOMI.    Thorax & Lungs: Easy unlabored respirations, Clear to ascultation bilaterally.  Cardiovascular: Regular rate, Regular rhythm, No murmurs, rubs or gallops. Bilateral pulses symmetrical.   Abdomen:  Soft, nontender, nondistended, normal active bowel sounds.   :    Skin: Visualized skin is  Dry, No erythema, No rash.   Musculoskeletal:   No cyanosis, clubbing or edema. No leg asymmetry.   Neurologic: Alert, Grossly non-focal.   Psychiatric: Normal affect, Normal mood  Lymphatic:  No cervical LAD        RADIOLOGY  No orders to display         COURSE & MEDICAL DECISION MAKING  Pertinent Labs & Imaging studies reviewed. (See chart for details)        1:59 PM - Medical record reviewed, patient seen and examined at bedside.    Decision Making:  This is a pleasant 35 y.o. year old male who presents with pain along the left maxillary gingival line the location of several caries of the base of the teeth.  No signs of deep space abscess.  Amoxicillin was prescribed which will hopefully control pain and any infection.  Anti-inflammatories are the mainstay of treatment, the patient will be given 1 [place] in the emergency room prior to discharge.    Discharge Medications:  New Prescriptions    AMOXICILLIN (AMOXIL) 500 MG CAP    Take 1 capsule by mouth 3 times a day for 7 days.    NAPROXEN (NAPROSYN) 500 MG TAB    Take 1 tablet by mouth 2 times a day with meals.       The patient was discharged home (see d/c instructions) was told to return immediately for any signs or symptoms listed, or any worsening at all.  The patient verbally agreed to the discharge precautions and follow-up plan which is documented in EPIC.        FINAL IMPRESSION  1. Pain due to dental caries

## 2021-04-11 NOTE — ED TRIAGE NOTES
Chief Complaint   Patient presents with   • Dental Pain     States pain X 2 days.  Pain increases when he bites down.  Connected to BP cuff, and continuous pulse ox upon arrival.  Pt in gown, call light in reach, bed in lowest position.  Chart up for ERP.

## 2021-04-30 ENCOUNTER — HOSPITAL ENCOUNTER (OUTPATIENT)
Dept: RADIOLOGY | Facility: MEDICAL CENTER | Age: 36
End: 2021-04-30
Attending: PSYCHIATRY & NEUROLOGY
Payer: MEDICAID

## 2021-04-30 DIAGNOSIS — R40.4 NONSPECIFIC PAROXYSMAL SPELL: ICD-10-CM

## 2021-04-30 PROCEDURE — 700117 HCHG RX CONTRAST REV CODE 255: Performed by: PSYCHIATRY & NEUROLOGY

## 2021-04-30 PROCEDURE — 70553 MRI BRAIN STEM W/O & W/DYE: CPT

## 2021-04-30 PROCEDURE — A9576 INJ PROHANCE MULTIPACK: HCPCS | Performed by: PSYCHIATRY & NEUROLOGY

## 2021-04-30 RX ADMIN — GADOTERIDOL 15 ML: 279.3 INJECTION, SOLUTION INTRAVENOUS at 13:29

## 2021-05-24 ENCOUNTER — NON-PROVIDER VISIT (OUTPATIENT)
Dept: NEUROLOGY | Facility: MEDICAL CENTER | Age: 36
End: 2021-05-24
Attending: PSYCHIATRY & NEUROLOGY
Payer: MEDICAID

## 2021-05-24 ENCOUNTER — TELEPHONE (OUTPATIENT)
Dept: NEUROLOGY | Facility: MEDICAL CENTER | Age: 36
End: 2021-05-24

## 2021-05-24 DIAGNOSIS — R40.4 NONSPECIFIC PAROXYSMAL SPELL: ICD-10-CM

## 2021-05-24 PROCEDURE — 95819 EEG AWAKE AND ASLEEP: CPT | Mod: 26 | Performed by: PSYCHIATRY & NEUROLOGY

## 2021-05-24 PROCEDURE — 95819 EEG AWAKE AND ASLEEP: CPT | Performed by: PSYCHIATRY & NEUROLOGY

## 2021-05-24 NOTE — PROCEDURES
ROUTINE VIDEO ELECTROENCEPHALOGRAM REPORT      REFERRING PROVIDER: Roxy Vail MD  DOS: 5/24/2021 (total recording of 32 minutes)    INDICATION:  Paras Lamb 35 y.o. male presenting with paroxysmal spells of uncertain etiology.  CURRENT ANTIEPILEPTIC REGIMEN: None    TECHNIQUE: 30 channel routine video electroencephalogram (EEG) was performed in accordance with the international 10-20 system. The study was reviewed in bipolar and referential montages. The recording examined the patient during wakeful and drowsy/sleep state(s).     DESCRIPTION OF RECORD:  When maximally alert, the background activity of this recording was characterized by a well preserved frontal beta to occipital alpha gradient with a posterior dominant 10 Hz rhythm that attenuated appropriate to eye opening.    The patient slept spontaneously during this recording, and adequate levels of stage II sleep, characterized by vertex waves and symmetric sleep spindles, were observed.       ACTIVATION PROCEDURES:   Hyperventilation induced no significant changes of the background.    Photic stimulation induced a symmetric occipital driving response.      ICTAL AND/OR INTERICTAL FINDINGS:   No focal or generalized epileptiform activity noted. No regional slowing was seen during this routine study.  No clinical events or seizures were reported or recorded during the study.     EKG: sampling of the EKG recording demonstrated sinus rhythm.       INTERPRETATION:  This is a normal awake and asleep video EEG.       CLINICAL CORRELATION:   A normal EEG does not exclude the possibility of underlying seizures or a diagnosis of epilepsy.    Roxy Vail MD  Neurology - Neurophysiology  [facility]

## 2021-05-24 NOTE — TELEPHONE ENCOUNTER
----- Message from Roxy Vail M.D. sent at 5/24/2021  1:17 PM PDT -----  Please let the patient know his EEG was normal.  We can discuss the implications on follow-up.

## 2022-03-24 ENCOUNTER — OFFICE VISIT (OUTPATIENT)
Dept: URGENT CARE | Facility: CLINIC | Age: 37
End: 2022-03-24
Payer: MEDICAID

## 2022-03-24 VITALS
WEIGHT: 169 LBS | TEMPERATURE: 98.7 F | HEIGHT: 70 IN | HEART RATE: 88 BPM | DIASTOLIC BLOOD PRESSURE: 78 MMHG | OXYGEN SATURATION: 96 % | SYSTOLIC BLOOD PRESSURE: 108 MMHG | BODY MASS INDEX: 24.2 KG/M2 | RESPIRATION RATE: 16 BRPM

## 2022-03-24 DIAGNOSIS — S99.829A TOENAIL TORN AWAY: ICD-10-CM

## 2022-03-24 PROCEDURE — 99213 OFFICE O/P EST LOW 20 MIN: CPT | Performed by: INTERNAL MEDICINE

## 2022-03-24 RX ORDER — INSULIN DEGLUDEC 200 U/ML
INJECTION, SOLUTION SUBCUTANEOUS
COMMUNITY
Start: 2021-10-07

## 2022-03-24 RX ORDER — INSULIN LISPRO 100 [IU]/ML
INJECTION, SOLUTION INTRAVENOUS; SUBCUTANEOUS
COMMUNITY

## 2022-03-24 ASSESSMENT — ENCOUNTER SYMPTOMS
FEVER: 0
NAUSEA: 0
VOMITING: 0
CHILLS: 0

## 2022-03-24 NOTE — LETTER
March 24, 2022    To Whom It May Concern:         This is confirmation that Paras Lamb went to the urgent care today on 3/24/2022         If you have any questions please do not hesitate to call me at the phone number listed below.    Sincerely,          Karsten Winter M.D.  [phone]

## 2022-03-24 NOTE — PROGRESS NOTES
Chief Complaint:      HPI:      Paras Lamb is a 36 y.o. male with history of type 1 diabetes treated at the [facility] with basal bolus therapy with Tresiba and Humalog pens.  He knows how to carb count.  He reports that he was picking on his right second toenail last night and was trying to remove overhanging nail and he thought that he completely avulsed the nail.  He applied Neosporin and wrapped it with bandage and decided come here to get evaluated to make sure that his nail is not infected.        ROS:   Review of Systems   Constitutional: Negative for chills and fever.   Gastrointestinal: Negative for nausea and vomiting.   Skin: Negative for rash.        Past Medical History:  He   Patient Active Problem List    Diagnosis Date Noted   • Encephalopathy acute 04/06/2019   • Hypokalemia 11/24/2018   • Hypomagnesemia 11/24/2018   • Finger wound, simple, open, initial encounter 11/23/2018   • Depression 11/23/2018   • H/O medication noncompliance 11/23/2018   • Hyponatremia 10/23/2018   • Acidosis, metabolic 10/15/2018   • Homelessness 10/14/2018   • Diabetic polyneuropathy associated with type 1 diabetes mellitus (HCC) 10/14/2018   • Acute midline low back pain without sciatica 10/14/2018   • Postprocedural pneumothorax 06/11/2018   • DKA (diabetic ketoacidoses) 06/10/2018   • Acute respiratory failure (HCC) 06/10/2018   • Hematemesis 06/10/2018   • Gastritis 05/27/2017   • Obtundation 04/01/2016   • Tachycardia 03/31/2016   • Type I diabetes mellitus (HCC) 03/31/2016   • Syncope 03/31/2016     Past Surgical History:  He   Past Surgical History:   Procedure Laterality Date   • THORACOTOMY Right       Allergies:  He Patient has no known allergies.   Current Medications:  He   Current Outpatient Medications:   •  Insulin Degludec (TRESIBA FLEXTOUCH) 200 UNIT/ML Solution Pen-injector, Inject 60 U, Disp: , Rfl:   •  insulin lispro (HUMALOG,ADMELOG) 100 UNIT/ML Solution Pen-injector injection  "PEN, INJECT 2 to 4 UNITS SUBCUTANEOUSLY DOSE PER SLIDING SCALE THREE TIMES PER DAY FOR 30 DAYS PRE MEAL BS *, Disp: , Rfl:   •  Insulin Glargine (BASAGLAR KWIKPEN) 100 UNIT/ML Solution Pen-injector, Inject 20 Units as instructed 2 Times a Day., Disp: 15 PEN, Rfl: 3  •  naproxen (NAPROSYN) 500 MG Tab, Take 1 tablet by mouth 2 times a day with meals. (Patient not taking: Reported on 3/24/2022), Disp: 30 tablet, Rfl: 0  Social History:  He   Social History     Socioeconomic History   • Marital status: Single     Spouse name: Not on file   • Number of children: Not on file   • Years of education: Not on file   • Highest education level: Not on file   Occupational History   • Not on file   Tobacco Use   • Smoking status: Current Every Day Smoker     Packs/day: 0.25     Years: 17.00     Pack years: 4.25     Types: Cigarettes   • Smokeless tobacco: Never Used   Substance and Sexual Activity   • Alcohol use: No     Comment: Stopped drinking ~ 2010   • Drug use: Not Currently   • Sexual activity: Not on file   Other Topics Concern   • Not on file   Social History Narrative   • Not on file     Social Determinants of Health     Financial Resource Strain: Not on file   Food Insecurity: Not on file   Transportation Needs: Not on file   Physical Activity: Not on file   Stress: Not on file   Social Connections: Not on file   Intimate Partner Violence: Not on file   Housing Stability: Not on file      Family History:   His   Family History   Problem Relation Age of Onset   • Seizures Mother    • No Known Problems Father    • Diabetes Maternal Grandfather         PHYSICAL EXAM:    Vital signs: /78   Pulse 88   Temp 37.1 °C (98.7 °F) (Temporal)   Resp 16   Ht 1.778 m (5' 10\")   Wt 76.7 kg (169 lb)   SpO2 96%   BMI 24.25 kg/m²   Physical Exam  Constitutional:       General: He is not in acute distress.     Appearance: He is normal weight. He is not ill-appearing or toxic-appearing.   HENT:      Head: Normocephalic " and atraumatic.      Mouth/Throat:      Mouth: Mucous membranes are moist.      Pharynx: Oropharynx is clear.   Eyes:      Extraocular Movements: Extraocular movements intact.      Conjunctiva/sclera: Conjunctivae normal.      Pupils: Pupils are equal, round, and reactive to light.   Cardiovascular:      Rate and Rhythm: Normal rate and regular rhythm.      Pulses: Normal pulses.      Heart sounds: Normal heart sounds.   Pulmonary:      Effort: Pulmonary effort is normal.      Breath sounds: Normal breath sounds.   Musculoskeletal:        Feet:    Feet:      Comments: Right second toenail appears to be intact and still attached to the nailbed.  The nail plate is lightly colored.  The proximal nail folds are not swollen or inflamed, the nail grooves are not erythematous or swollen.  The skin around the nail is lightly colored most likely because of previous bandage use and antibiotic ointment usage   Neurological:      Mental Status: He is alert.         Labs:  Lab Results   Component Value Date/Time    HBA1C 15.9 (H) 11/24/2018 03:08 AM      Lab Results   Component Value Date/Time    CHOLSTRLTOT 164 04/27/2017 0216    TRIGLYCERIDE 153 (H) 06/11/2018 0545    HDL 30 (A) 04/27/2017 0216     (H) 04/27/2017 0216     No results found for: MICROALBCALC, MALBCRT, MALBEXCR, [id number], MICROALBUR, MICRALB, UMICROALBUM, MICROALBTIM   Lab Results   Component Value Date/Time    CREATININE 0.79 04/07/2019 04:45 AM           ASSESSMENT/PLAN:   1. Toenail torn away  Examined right second toenail carefully and provide reassurance to the patient that there is no evidence of complete avulsion of the nail from the nailbed and Imitrex.  There is no evidence of paronychia.  At this time there is no indication for oral antibiotics   I redressed the nail with bandage   I recommend that he continue to apply Neosporin until the small abrasion from when he removed the overhanging nail is healed  and I recommend that he follow-up with  his primary care.  If there is any change in his condition he is welcome to come back for reevaluation.       Return if symptoms worsen or fail to improve.      Differential diagnosis, natural history, supportive care, and indications for immediate follow-up discussed at length.   Instructed to return to  or nearest emergency department if symptoms fail to improve, for any change in condition, further concerns, or new concerning symptoms.    Patient states understanding of the plan of care and discharge instructions.      Karsten Winter MD, FACE, ECNU  03/24/22

## 2022-05-04 ENCOUNTER — OFFICE VISIT (OUTPATIENT)
Dept: NEUROLOGY | Facility: MEDICAL CENTER | Age: 37
End: 2022-05-04
Attending: REGISTERED NURSE
Payer: MEDICAID

## 2022-05-04 DIAGNOSIS — R40.4 NONSPECIFIC PAROXYSMAL SPELL: ICD-10-CM

## 2022-05-04 PROCEDURE — 99215 OFFICE O/P EST HI 40 MIN: CPT | Performed by: REGISTERED NURSE

## 2022-05-04 NOTE — PROGRESS NOTES
"[facility] NEUROLOGY  MULTIPLE SCLEROSIS & NEUROIMMUNOLOGY  NEW PATIENT VISIT        Also followed by: Dr. Vail    DISEASE SUMMARY:  CC:  Seizure like spells    HISTORY OF ILLNESS:  Paras Lamb is a 36 y.o. male with a history most notable for seizure like spell 02/11/2019.  He feels it was related to his blood sugar and was a one time occurrence.    Today, he was alone and  provided the following history:    In Feb 2019 he became hypoglycemic and had a spell, \"not a seizure he states\" and since then has had to come for DMV clearance.    A review of MS-related symptoms was notable for the following:    ROS:   Constitutional: No fevers or chills.  Eyes: No blurry vision or eye pain.  ENT: No dysphagia or hearing loss.  Respiratory: No cough or shortness of breath.  Cardiovascular: No chest pain or palpitations.  GI: No nausea, vomiting, or diarrhea.  : No urinary incontinence or dysuria.  Musculoskeletal: No joint swelling or arthralgias.  Skin: No skin rashes.  Neuro: No headaches, dizziness, or tremors.  Endocrine: No heat or cold intolerance. No polydipsia or polyuria.  Psych: No depression or anxiety.  Heme/Lymph: No easy bruising or swollen lymph nodes.  All other review of systems were reviewed and were negative    MEDICAL AND SURGICAL HISTORY:  Past Medical History:   Diagnosis Date   • Anxiety    • Depression    • Diabetes    • Hypertension    • Seizure (HCC)      Past Surgical History:   Procedure Laterality Date   • THORACOTOMY Right      MEDICATIONS:  Current Outpatient Medications   Medication Sig   • Insulin Degludec (TRESIBA FLEXTOUCH) 200 UNIT/ML Solution Pen-injector Inject 60 U   • insulin lispro (HUMALOG,ADMELOG) 100 UNIT/ML Solution Pen-injector injection PEN INJECT 2 to 4 UNITS SUBCUTANEOUSLY DOSE PER SLIDING SCALE THREE TIMES PER DAY FOR 30 DAYS PRE MEAL BS *   • naproxen (NAPROSYN) 500 MG Tab Take 1 tablet by mouth 2 times a day with meals. (Patient not taking: Reported on 3/24/2022)   • " Insulin Glargine (BASAGLAR KWIKPEN) 100 UNIT/ML Solution Pen-injector Inject 20 Units as instructed 2 Times a Day.     SOCIAL HISTORY:  Social History     Tobacco Use   • Smoking status: Current Every Day Smoker     Packs/day: 0.25     Years: 17.00     Pack years: 4.25     Types: Cigarettes   • Smokeless tobacco: Never Used   Substance Use Topics   • Alcohol use: No     Comment: Stopped drinking ~ 2010     Social History     Social History Narrative   • Not on file     FAMILY HISTORY:  Family History   Problem Relation Age of Onset   • Seizures Mother    • No Known Problems Father    • Diabetes Maternal Grandfather      REVIEW OF SYSTEMS:  A ROS was completed.  Pertinent positives and negatives were included in the HPI, above.  All other systems were reviewed and are negative.    PHYSICAL EXAM:  General/Medical:  - NAD  - hair, skin, nails, and joints were normal  - neck was supple without Lhermitte's phenomenon  - heart rate and rhythm were regular, no carotid bruits appreciated    Neuro:  MENTAL STATUS: awake and alert; no deficits of speech or language; oriented to person, place, and time; affect was appropriate to situation    CRANIAL NERVES:    II:  pupils: 3/3 to 2/2 without a relative afferent pupillary defect, discs: sharp, no red desaturation noted    III/IV/VI: versions: intact without nystagmus    V: facial sensation: symmetric to light touch    VII: facial expression: symmetric    VIII: hearing: intact to finger rub    IX/X: palate: elevates symmetrically    XI: shoulder shrug: symmetric    XII: tongue: midline    MOTOR:  - bulk: normal throughout  - tone: normal throughout  Upper Extremity Strength  (R/L)    5/5   Elbow flexion 5/5   Elbow extension 5/5   Shoulder abduction 5/5     Lower Extremity Strength  (R/L)   Hip flexion 5/5   Knee extension 5/5   Knee flexion 5/5   Ankle plantarflexion 5/5   Ankle dorsiflexion 5/5     -  can walk on toes and heels  - pronator drift: absent  - abnormal  movements: none    SENSATION:  - light touch: equal to bilateral sides of the body  - vibration (R/L, seconds): N/Tat the great toes  - pinprick: N/T  - proprioception: N/T  - Romberg: absent    COORDINATION:  - finger to nose: normal, no ataxia on exam  - finger tapping: rapid and accurate, bilaterally    REFLEXES:  Reflex Right Left   BR 2+ 2+   Biceps 2+ 2+   Triceps 2+ 2+   Patellae 2+ 2+   Achilles 2+ 2+   Toes N/T N/T     GAIT:  - narrow base and normal  - heel-raised/toe-raised gait: intact/intact  - tandem gait: intact      REVIEW OF IMAGING STUDIES: I reviewed the following studies:  MRI Brain:  Date: 04/30/2021  Impression:  IMPRESSION:     1.  Moderate-sized arachnoid cyst in the left middle cranial fossa. 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 No appreciable gross cortical dysplasia in the adjacent left temporal lobe. There is very slight mass effect due to bulging of   the uncus against the left cerebral peduncle at the midbrain.  2.  Mild supratentorial white matter disease consistent with microvascular ischemic change versus demyelination or gliosis. Lesion morphology indicative of multiple sclerosis is not demonstrated.  3.  No evidence of acute infarction, hemorrhagic lesion, or enhancing mass lesion.  4.  No evidence of mesial temporal sclerosis.               REVIEW OF LABORATORY STUDIES:  Results for JO MUÑOZ (MRN [id number]) as of 5/4/2022 15:59   Ref. Range 4/7/2019 04:45   WBC Latest Ref Range: 4.8 - 10.8 K/uL 6.4   RBC Latest Ref Range: 4.70 - 6.10 M/uL 5.18   Hemoglobin Latest Ref Range: 14.0 - 18.0 g/dL 15.3   Hematocrit Latest Ref Range: 42.0 - 52.0 % 46.4   MCV Latest Ref Range: 81.4 - 97.8 fL 89.6   MCH Latest Ref Range: 27.0 - 33.0 pg 29.5   MCHC Latest Ref Range: 33.7 - 35.3 g/dL 33.0 (L)   RDW Latest Ref Range: 35.9 - 50.0 fL 42.5   Platelet Count Latest Ref Range: 164 - 446 K/uL 240   MPV Latest Ref Range: 9.0 - 12.9 fL 10.6   Neutrophils-Polys Latest Ref  Range: 44.00 - 72.00 % 58.90   Neutrophils (Absolute) Latest Ref Range: 1.82 - 7.42 K/uL 3.75   Lymphocytes Latest Ref Range: 22.00 - 41.00 % 26.60   Lymphs (Absolute) Latest Ref Range: 1.00 - 4.80 K/uL 1.70   Monocytes Latest Ref Range: 0.00 - 13.40 % 9.20   Monos (Absolute) Latest Ref Range: 0.00 - 0.85 K/uL 0.59   Eosinophils Latest Ref Range: 0.00 - 6.90 % 4.40   Eos (Absolute) Latest Ref Range: 0.00 - 0.51 K/uL 0.28   Basophils Latest Ref Range: 0.00 - 1.80 % 0.60   Baso (Absolute) Latest Ref Range: 0.00 - 0.12 K/uL 0.04   Immature Granulocytes Latest Ref Range: 0.00 - 0.90 % 0.30   Immature Granulocytes (abs) Latest Ref Range: 0.00 - 0.11 K/uL 0.02   Nucleated RBC Latest Units: /100 WBC 0.00   NRBC (Absolute) Latest Units: K/uL 0.00   Results for PARAS LAMB (MRN [id number]) as of 5/4/2022 15:59   Ref. Range 4/7/2019 04:45   Sodium Latest Ref Range: 135 - 145 mmol/L 137   Potassium Latest Ref Range: 3.6 - 5.5 mmol/L 4.0   Chloride Latest Ref Range: 96 - 112 mmol/L 108   Co2 Latest Ref Range: 20 - 33 mmol/L 25   Anion Gap Latest Ref Range: 0.0 - 11.9  4.0   Glucose Latest Ref Range: 65 - 99 mg/dL 272 (H)   Bun Latest Ref Range: 8 - 22 mg/dL 8   Creatinine Latest Ref Range: 0.50 - 1.40 mg/dL 0.79   GFR If  Latest Ref Range: >60 mL/min/1.73 m 2 >60   GFR If Non  Latest Ref Range: >60 mL/min/1.73 m 2 >60   Calcium Latest Ref Range: 8.5 - 10.5 mg/dL 8.5       ASSESSMENT:  Paras Lamb is a 36 y.o. with a history of seizure like activity after having a low blood sugar in February of 2019.  He has had no episodes since that time.  He is not on antiseizure medications and monitors his blood sugars without any difficulties.      PLAN:  DMV paperwork signed and faxed to DMV      Follow-Up:  Per Dr. Vail    Signed: Ailyn Georges, [email or web address]  My total time spent caring for the patient on the day of the encounter was 40 minutes.   This does not include time spent on separately  billable procedures/tests.

## 2023-06-02 NOTE — PROGRESS NOTES
Diabetes education: Met with pt this am. Please see consult note.  Plan: Please call [phone] when pt awake enough for education on meter and insulin. Pt needs to have prescription for True Metrix strips and lancets to test 4 times a day. Not sure why patient is on Glipizide (off insulin for short period and came in DKA, type one??).    fair balance